# Patient Record
Sex: MALE | Race: WHITE | NOT HISPANIC OR LATINO | Employment: OTHER | URBAN - NONMETROPOLITAN AREA
[De-identification: names, ages, dates, MRNs, and addresses within clinical notes are randomized per-mention and may not be internally consistent; named-entity substitution may affect disease eponyms.]

---

## 2017-01-06 DIAGNOSIS — R25.9 ABNORMAL INVOLUNTARY MOVEMENT: Primary | ICD-10-CM

## 2017-01-06 RX ORDER — CYCLOBENZAPRINE HCL 10 MG
10 TABLET ORAL 2 TIMES DAILY
Qty: 60 TABLET | Refills: 3 | Status: SHIPPED | OUTPATIENT
Start: 2017-01-06 | End: 2017-05-08

## 2017-01-18 ENCOUNTER — OFFICE VISIT (OUTPATIENT)
Dept: CHIROPRACTIC MEDICINE | Facility: OTHER | Age: 82
End: 2017-01-18
Attending: CHIROPRACTOR
Payer: COMMERCIAL

## 2017-01-18 DIAGNOSIS — M99.02 SEGMENTAL AND SOMATIC DYSFUNCTION OF THORACIC REGION: ICD-10-CM

## 2017-01-18 DIAGNOSIS — M54.2 CERVICALGIA: ICD-10-CM

## 2017-01-18 DIAGNOSIS — M99.01 SEGMENTAL AND SOMATIC DYSFUNCTION OF CERVICAL REGION: Primary | ICD-10-CM

## 2017-01-18 DIAGNOSIS — M99.03 SEGMENTAL AND SOMATIC DYSFUNCTION OF LUMBAR REGION: ICD-10-CM

## 2017-01-18 PROCEDURE — 98941 CHIROPRACT MANJ 3-4 REGIONS: CPT | Mod: AT | Performed by: CHIROPRACTOR

## 2017-01-18 NOTE — PROGRESS NOTES
Subjective Finding:    Chief compalint: Patient presents with:  Neck Pain  Back Pain  , Pain Scale: 5/10, Intensity: dull and ache, Duration: 1 weeks, Change since last visit: , Radiating: bilateral buttock.    Date of injury:     Activities that the pain restricts:   Home/household/hobbies/social activities: yes.  Work duties: yes.  Sleep: no.  Makes symptoms better: rest.  Makes symptoms worse: activity, lumbar extension and lumbar flexion.  Have you seen anyone else for the symptoms? no.  Work related: no  Automobile related injury: no.    Objective and Assessment:    Posture Analysis:   High shoulder: right.  Head tilt: right.  High iliac crest: right.  Head carriage: neutral.  Thoracic Kyphosis: neutral.  Lumbar Lordosis: forward.    Lumbar Range of Motion: flexion decreased, extension decreased, left lateral flexion decreased and right lateral flexion decreased.  Cervical Range of Motion: .  Thoracic Range of Motion: .  Extremity Range of Motion: .    Palpation:   Quad lumb: bilateral, referred pain: no    Segmental dysfunction pre-treatment: T10  L4-5  SI. C5    Assessment post-treatment:  Cervical: ROM increased.  Thoracic: ROM increased.  Lumbar: ROM increased, pain and tenderness decreased and muscle spasm decreased.    Comments: .      Complicating Factors: .    Plan / Procedure:    Expected release date: .  Treatment plan: 1 times per month.  Instructed patient: ice 20 minutes every other hour as needed and rest.  Short term goals: reduce pain.  Long term goals: restore normal function.  Prognosis: excellent.

## 2017-01-25 ENCOUNTER — TRANSFERRED RECORDS (OUTPATIENT)
Dept: HEALTH INFORMATION MANAGEMENT | Facility: HOSPITAL | Age: 82
End: 2017-01-25

## 2017-01-27 ENCOUNTER — HOSPITAL ENCOUNTER (OUTPATIENT)
Dept: PHYSICAL THERAPY | Facility: HOSPITAL | Age: 82
Setting detail: THERAPIES SERIES
End: 2017-01-27
Attending: ORTHOPAEDIC SURGERY
Payer: MEDICARE

## 2017-01-27 PROCEDURE — 97035 APP MDLTY 1+ULTRASOUND EA 15: CPT | Mod: GP

## 2017-01-27 PROCEDURE — G8981 BODY POS CURRENT STATUS: HCPCS | Mod: GP,CK

## 2017-01-27 PROCEDURE — 97012 MECHANICAL TRACTION THERAPY: CPT | Mod: GP

## 2017-01-27 PROCEDURE — 97162 PT EVAL MOD COMPLEX 30 MIN: CPT | Mod: GP

## 2017-01-27 PROCEDURE — G8982 BODY POS GOAL STATUS: HCPCS | Mod: GP,CJ

## 2017-01-27 PROCEDURE — 40000718 ZZHC STATISTIC PT DEPARTMENT ORTHO VISIT

## 2017-01-27 NOTE — PROGRESS NOTES
01/27/17 0900   General Information   Type of Visit Initial OP Ortho PT Evaluation   Start of Care Date 01/27/17   Referring Physician Dr Berumen   Patient/Family Goals Statement less pain   Orders Evaluate and Treat   Orders Comment 1-2x week x 6 weeks prn. C spine pain , mod DDD, stretching, strengthening/manual therapy and modalities prn   Date of Order 01/25/17   Insurance Type Medicare   Medical Diagnosis Cervical DDD   Surgical/Medical history reviewed Yes   Precautions/Limitations no known precautions/limitations   General Information Comments neck pain may have been caused by bumping head on L side when getting into sons car   Body Part(s)   Body Part(s) Cervical Spine   Presentation and Etiology   Pertinent history of current problem (include personal factors and/or comorbidities that impact the POC) This 83 y/o male  with sudden onset of neck pain insidiously. Unsure of what caused it. Pt has no headaches, no UE symptoms. L> R side. Driving, sleeping are difficulty. MD issued hydrocodone, mm relaxant in am and pm. May have hurt neck by bumping head on L side when getting into car.    Impairments A. Pain;D. Decreased ROM;E. Decreased flexibility   Functional Limitations perform activities of daily living;perform desired leisure / sports activities  (driving, sleeping, )   Symptom Location L sided neck   How/Where did it occur From insidious onset   Onset date of current episode/exacerbation (approx 3 weeks ago)   Chronicity New   Pain rating (0-10 point scale) Best (/10);Worst (/10)   Best (/10) 5   Worst (/10) 2   Pain quality A. Sharp   Frequency of pain/symptoms A. Constant   Pain/symptoms exacerbated by G. Certain positions;I. Bending;F. Nothing  (driving)   Pain/symptoms eased by C. Rest;D. Nothing;G. Heat;H. Cold;I. 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 Retired   Living environment Apartment/condo   Current equipment-Gait/Locomotion None   Current equipment-ADL None   Fall Risk Screen   Per patient - Fall 2 or more times in past year? No   Per patient - Fall with injury in past year? No   Is patient a fall risk? No   System Outcome Measures   Outcome Measures (Neck disability index 22%)   Cervical Spine   Observation holds head rotated R slightly   Posture fwd head on neck,   Cervical Flexion ROM wnl   Cervical Extension ROM 75%   Cervical Right Side Bending ROM 50%   Cervical Left Side Bending ROM 50%   Cervical Right Rotation ROM 50%   Cervical Left Rotation ROM 25%   Shoulder AROM Screen wfl   Shoulder Shrug (C2-C4) Strength 5/5   Shoulder Abd (C5) Strength 4/5   Shoulder Add (C7) Strength 4   Shoulder ER (C5, C6) Strength 5   Shoulder IR (C5, C6) Strength 5   Elbow Flexion (C5, C6) Strength 5   Elbow Extension (C7) Strength 5   Upper Trapezius Flexibility L>R tightness/tenderness/guarding   Levator Scapula Flexibility L sided tightness   Scalene Flexibility L>R sided tightness   Palpation Signif tenderness to medium palpation L trap, scalenes, paraspinals   Dermatome/Sensory Testing equal to light touch t/o BUEs   Planned Therapy Interventions   Planned Therapy Interventions ROM;manual therapy;joint mobilization;stretching   Planned Therapy Interventions Comment HEP   Planned Modality Interventions   Planned Modality Interventions Electrical stimulation;Ultrasound;Traction   Planned Modality Interventions Comments prn   Clinical Impression   Criteria for Skilled Therapeutic Interventions Met yes, treatment indicated   PT Diagnosis Cervical DDD aggravated by strain from hitting head on car,   Influenced by the following impairments pain, tightness,    Functional limitations  due to impairments sleep, drive, working in garden   Clinical Presentation Evolving/Changing   Clinical Presentation Rationale previous hx of chronic back pain, OA, DJD,    Clinical Decision Making (Complexity) Moderate complexity   Therapy Frequency 2 times/Week   Predicted Duration of Therapy Intervention (days/wks) 4   Risk & Benefits of therapy have been explained Yes   Patient, Family & other staff in agreement with plan of care Yes   Clinical Impression Comments Pt will benefit from PT to improve CROM and segmental mobility through US, Traction, manual therapy, as well as ROM/stretching.    Education Assessment   Barriers to Learning No barriers   ORTHO GOALS   PT Ortho Eval Goals 1;2;3   Ortho Goal 1   Goal Identifier STG 1   Goal Description Pt will demonstrate indep HEP compliance to increase CROM to at least 75% of wnl   Target Date 02/17/17   Ortho Goal 2   Goal Identifier STG 2   Goal Description Pt will have indep postural correction for when sitting watching tv through education and HEP.    Target Date 02/17/17   Ortho Goal 3   Goal Identifier LTG   Goal Description Pt will return to painfree driving without restriction /difficulty for improved safety   Target Date 03/24/17   Total Evaluation Time   Total Evaluation Time 30   Therapy Certification   Certification date from 01/27/17   Certification date to 03/24/17   Medical Diagnosis Cervical strain, DDD   I certify the need for these services furnished under this plan of treatment and while under my care. (Physician co-signature of this document indicates review and certification of the therapy plan).      _____________________________     __________________________    ____________  Physician's Signature                 Date               Time

## 2017-01-27 NOTE — PROGRESS NOTES
01/27/17 0900   General Information   Type of Visit Initial OP Ortho PT Evaluation   Start of Care Date 01/27/17   Referring Physician Dr Berumen   Patient/Family Goals Statement less pain   Orders Evaluate and Treat   Orders Comment 1-2x week x 6 weeks prn. C spine pain , mod DDD, stretching, strengthening/manual therapy and modalities prn   Date of Order 01/25/17   Insurance Type Medicare   Medical Diagnosis Cervical DDD   Surgical/Medical history reviewed Yes   Precautions/Limitations no known precautions/limitations   General Information Comments neck pain may have been caused by bumping head on L side when getting into sons car   Body Part(s)   Body Part(s) Cervical Spine   Presentation and Etiology   Pertinent history of current problem (include personal factors and/or comorbidities that impact the POC) This 83 y/o male  with sudden onset of neck pain insidiously. Unsure of what caused it. Pt has no headaches, no UE symptoms. L> R side. Driving, sleeping are difficulty. MD issued hydrocodone, mm relaxant in am and pm. May have hurt neck by bumping head on L side when getting into car.    Impairments A. Pain;D. Decreased ROM;E. Decreased flexibility   Functional Limitations perform activities of daily living;perform desired leisure / sports activities  (driving, sleeping, )   Symptom Location L sided neck   How/Where did it occur From insidious onset   Onset date of current episode/exacerbation (approx 3 weeks ago)   Chronicity New   Pain rating (0-10 point scale) Best (/10);Worst (/10)   Best (/10) 5   Worst (/10) 2   Pain quality A. Sharp   Frequency of pain/symptoms A. Constant   Pain/symptoms exacerbated by G. Certain positions;I. Bending;F. Nothing  (driving)   Pain/symptoms eased by C. Rest;D. Nothing;G. Heat;H. Cold;I. 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 Retired   Living environment Apartment/condo   Current equipment-Gait/Locomotion None   Current equipment-ADL None   Fall Risk Screen   Per patient - Fall 2 or more times in past year? No   Per patient - Fall with injury in past year? No   Is patient a fall risk? No   System Outcome Measures   Outcome Measures (Neck disability index 22%)   Cervical Spine   Observation holds head rotated R slightly   Posture fwd head on neck,   Cervical Flexion ROM wnl   Cervical Extension ROM 75%   Cervical Right Side Bending ROM 50%   Cervical Left Side Bending ROM 50%   Cervical Right Rotation ROM 50%   Cervical Left Rotation ROM 25%   Shoulder AROM Screen wfl   Shoulder Shrug (C2-C4) Strength 5/5   Shoulder Abd (C5) Strength 4/5   Shoulder Add (C7) Strength 4   Shoulder ER (C5, C6) Strength 5   Shoulder IR (C5, C6) Strength 5   Elbow Flexion (C5, C6) Strength 5   Elbow Extension (C7) Strength 5   Upper Trapezius Flexibility L>R tightness/tenderness/guarding   Levator Scapula Flexibility L sided tightness   Scalene Flexibility L>R sided tightness   Palpation Signif tenderness to medium palpation L trap, scalenes, paraspinals   Dermatome/Sensory Testing equal to light touch t/o BUEs   Planned Therapy Interventions   Planned Therapy Interventions ROM;manual therapy;joint mobilization;stretching   Planned Therapy Interventions Comment HEP   Planned Modality Interventions   Planned Modality Interventions Electrical stimulation;Ultrasound;Traction   Planned Modality Interventions Comments prn   Clinical Impression   Criteria for Skilled Therapeutic Interventions Met yes, treatment indicated   PT Diagnosis Cervical DDD aggravated by strain from hitting head on car,   Influenced by the following impairments pain, tightness,    Functional limitations  due to impairments sleep, drive, working in garden   Clinical Presentation Evolving/Changing   Clinical Presentation Rationale previous hx of chronic back pain, OA, DJD,    Clinical Decision Making (Complexity) Moderate complexity   Therapy Frequency 2 times/Week   Predicted Duration of Therapy Intervention (days/wks) 4   Risk & Benefits of therapy have been explained Yes   Patient, Family & other staff in agreement with plan of care Yes   Clinical Impression Comments Pt will benefit from PT to improve CROM and segmental mobility through US, Traction, manual therapy, as well as ROM/stretching.    Education Assessment   Barriers to Learning No barriers   ORTHO GOALS   PT Ortho Eval Goals 1;2;3   Ortho Goal 1   Goal Identifier STG 1   Goal Description Pt will demonstrate indep HEP compliance to increase CROM to at least 75% of wnl   Target Date 02/17/17   Ortho Goal 2   Goal Identifier STG 2   Goal Description Pt will have indep postural correction for when sitting watching tv through education and HEP.    Target Date 02/17/17   Ortho Goal 3   Goal Identifier LTG   Goal Description Pt will return to painfree driving without restriction /difficulty for improved safety   Target Date 03/24/17   Total Evaluation Time   Total Evaluation Time 30   Therapy Certification   Certification date from 01/27/17   Certification date to 03/24/17   Medical Diagnosis Cervical strain, DDD

## 2017-02-01 ENCOUNTER — HOSPITAL ENCOUNTER (OUTPATIENT)
Dept: PHYSICAL THERAPY | Facility: HOSPITAL | Age: 82
Setting detail: THERAPIES SERIES
End: 2017-02-01
Attending: FAMILY MEDICINE
Payer: MEDICARE

## 2017-02-01 PROCEDURE — 97035 APP MDLTY 1+ULTRASOUND EA 15: CPT | Mod: GP

## 2017-02-01 PROCEDURE — 40000718 ZZHC STATISTIC PT DEPARTMENT ORTHO VISIT

## 2017-02-01 PROCEDURE — 97012 MECHANICAL TRACTION THERAPY: CPT | Mod: GP

## 2017-02-03 ENCOUNTER — HOSPITAL ENCOUNTER (OUTPATIENT)
Dept: PHYSICAL THERAPY | Facility: HOSPITAL | Age: 82
Setting detail: THERAPIES SERIES
End: 2017-02-03
Attending: FAMILY MEDICINE
Payer: MEDICARE

## 2017-02-03 PROCEDURE — 97012 MECHANICAL TRACTION THERAPY: CPT | Mod: GP

## 2017-02-03 PROCEDURE — 40000718 ZZHC STATISTIC PT DEPARTMENT ORTHO VISIT

## 2017-02-03 PROCEDURE — 97035 APP MDLTY 1+ULTRASOUND EA 15: CPT | Mod: GP

## 2017-02-07 DIAGNOSIS — M54.50 CHRONIC BILATERAL LOW BACK PAIN WITHOUT SCIATICA: Primary | ICD-10-CM

## 2017-02-07 DIAGNOSIS — G89.29 CHRONIC BILATERAL LOW BACK PAIN WITHOUT SCIATICA: Primary | ICD-10-CM

## 2017-02-07 RX ORDER — HYDROCODONE BITARTRATE AND ACETAMINOPHEN 5; 325 MG/1; MG/1
TABLET ORAL
Qty: 60 TABLET | Refills: 0 | Status: SHIPPED | OUTPATIENT
Start: 2017-02-07 | End: 2017-06-13

## 2017-02-08 ENCOUNTER — HOSPITAL ENCOUNTER (OUTPATIENT)
Dept: PHYSICAL THERAPY | Facility: HOSPITAL | Age: 82
Setting detail: THERAPIES SERIES
End: 2017-02-08
Attending: FAMILY MEDICINE
Payer: MEDICARE

## 2017-02-08 PROCEDURE — 97110 THERAPEUTIC EXERCISES: CPT | Mod: GP

## 2017-02-08 PROCEDURE — 40000718 ZZHC STATISTIC PT DEPARTMENT ORTHO VISIT

## 2017-02-08 PROCEDURE — 97012 MECHANICAL TRACTION THERAPY: CPT | Mod: GP

## 2017-02-10 ENCOUNTER — HOSPITAL ENCOUNTER (OUTPATIENT)
Dept: PHYSICAL THERAPY | Facility: HOSPITAL | Age: 82
Setting detail: THERAPIES SERIES
End: 2017-02-10
Attending: FAMILY MEDICINE
Payer: MEDICARE

## 2017-02-10 PROCEDURE — 40000718 ZZHC STATISTIC PT DEPARTMENT ORTHO VISIT

## 2017-02-10 PROCEDURE — 97110 THERAPEUTIC EXERCISES: CPT | Mod: GP

## 2017-02-10 PROCEDURE — 97012 MECHANICAL TRACTION THERAPY: CPT | Mod: GP

## 2017-02-15 ENCOUNTER — HOSPITAL ENCOUNTER (OUTPATIENT)
Dept: PHYSICAL THERAPY | Facility: HOSPITAL | Age: 82
Setting detail: THERAPIES SERIES
End: 2017-02-15
Attending: FAMILY MEDICINE
Payer: MEDICARE

## 2017-02-15 PROCEDURE — 97012 MECHANICAL TRACTION THERAPY: CPT | Mod: GP

## 2017-02-15 PROCEDURE — 97035 APP MDLTY 1+ULTRASOUND EA 15: CPT | Mod: GP

## 2017-02-15 PROCEDURE — 40000718 ZZHC STATISTIC PT DEPARTMENT ORTHO VISIT

## 2017-02-17 ENCOUNTER — HOSPITAL ENCOUNTER (OUTPATIENT)
Dept: PHYSICAL THERAPY | Facility: HOSPITAL | Age: 82
Setting detail: THERAPIES SERIES
End: 2017-02-17
Attending: FAMILY MEDICINE
Payer: MEDICARE

## 2017-02-17 PROCEDURE — 40000718 ZZHC STATISTIC PT DEPARTMENT ORTHO VISIT

## 2017-02-17 PROCEDURE — 97012 MECHANICAL TRACTION THERAPY: CPT | Mod: GP

## 2017-02-17 PROCEDURE — 97035 APP MDLTY 1+ULTRASOUND EA 15: CPT | Mod: GP

## 2017-02-21 ENCOUNTER — TELEPHONE (OUTPATIENT)
Dept: FAMILY MEDICINE | Facility: OTHER | Age: 82
End: 2017-02-21

## 2017-02-21 ENCOUNTER — HOSPITAL ENCOUNTER (OUTPATIENT)
Dept: PHYSICAL THERAPY | Facility: HOSPITAL | Age: 82
Setting detail: THERAPIES SERIES
End: 2017-02-21
Attending: FAMILY MEDICINE
Payer: MEDICARE

## 2017-02-21 DIAGNOSIS — R79.9 ABNORMAL FINDING OF BLOOD CHEMISTRY: ICD-10-CM

## 2017-02-21 DIAGNOSIS — I10 BENIGN ESSENTIAL HYPERTENSION: ICD-10-CM

## 2017-02-21 DIAGNOSIS — R73.03 PREDIABETES: ICD-10-CM

## 2017-02-21 DIAGNOSIS — D50.8 OTHER IRON DEFICIENCY ANEMIA: ICD-10-CM

## 2017-02-21 DIAGNOSIS — E78.5 HYPERLIPIDEMIA LDL GOAL <130: Primary | ICD-10-CM

## 2017-02-21 PROCEDURE — 40000718 ZZHC STATISTIC PT DEPARTMENT ORTHO VISIT

## 2017-02-21 PROCEDURE — 97012 MECHANICAL TRACTION THERAPY: CPT | Mod: GP

## 2017-02-21 PROCEDURE — 97110 THERAPEUTIC EXERCISES: CPT | Mod: GP

## 2017-02-21 PROCEDURE — 97035 APP MDLTY 1+ULTRASOUND EA 15: CPT | Mod: GP

## 2017-02-21 NOTE — TELEPHONE ENCOUNTER
Pt is due for a lipid, bmp and alt. Should he have a PSA also? He has a hx of Hypertrophy of the Prostate.

## 2017-02-21 NOTE — TELEPHONE ENCOUNTER
Pt calling and asking if Dr would put in lab orders for his physical which is scheduled for 02/28/17. He would like to do them in [place] at the [facility].

## 2017-02-23 ENCOUNTER — HOSPITAL ENCOUNTER (OUTPATIENT)
Dept: PHYSICAL THERAPY | Facility: HOSPITAL | Age: 82
Setting detail: THERAPIES SERIES
End: 2017-02-23
Attending: FAMILY MEDICINE
Payer: MEDICARE

## 2017-02-23 DIAGNOSIS — D50.8 OTHER IRON DEFICIENCY ANEMIA: ICD-10-CM

## 2017-02-23 DIAGNOSIS — R73.03 PREDIABETES: ICD-10-CM

## 2017-02-23 DIAGNOSIS — R79.9 ABNORMAL FINDING OF BLOOD CHEMISTRY: ICD-10-CM

## 2017-02-23 DIAGNOSIS — E78.5 HYPERLIPIDEMIA LDL GOAL <130: ICD-10-CM

## 2017-02-23 DIAGNOSIS — I10 BENIGN ESSENTIAL HYPERTENSION: ICD-10-CM

## 2017-02-23 LAB
ALT SERPL W P-5'-P-CCNC: 20 U/L (ref 0–70)
ANION GAP SERPL CALCULATED.3IONS-SCNC: 8 MMOL/L (ref 3–14)
BUN SERPL-MCNC: 25 MG/DL (ref 7–30)
CALCIUM SERPL-MCNC: 9.3 MG/DL (ref 8.5–10.1)
CHLORIDE SERPL-SCNC: 107 MMOL/L (ref 94–109)
CHOLEST SERPL-MCNC: 117 MG/DL
CO2 SERPL-SCNC: 27 MMOL/L (ref 20–32)
CREAT SERPL-MCNC: 1.46 MG/DL (ref 0.66–1.25)
ERYTHROCYTE [DISTWIDTH] IN BLOOD BY AUTOMATED COUNT: 15.5 % (ref 10–15)
EST. AVERAGE GLUCOSE BLD GHB EST-MCNC: 146 MG/DL
GFR SERPL CREATININE-BSD FRML MDRD: 46 ML/MIN/1.7M2
GLUCOSE SERPL-MCNC: 116 MG/DL (ref 70–99)
HBA1C MFR BLD: 6.7 % (ref 4.3–6)
HCT VFR BLD AUTO: 42.9 % (ref 40–53)
HDLC SERPL-MCNC: 36 MG/DL
HGB BLD-MCNC: 13.8 G/DL (ref 13.3–17.7)
LDLC SERPL CALC-MCNC: 44 MG/DL
MCH RBC QN AUTO: 28 PG (ref 26.5–33)
MCHC RBC AUTO-ENTMCNC: 32.2 G/DL (ref 31.5–36.5)
MCV RBC AUTO: 87 FL (ref 78–100)
NONHDLC SERPL-MCNC: 81 MG/DL
PLATELET # BLD AUTO: 319 10E9/L (ref 150–450)
POTASSIUM SERPL-SCNC: 5 MMOL/L (ref 3.4–5.3)
RBC # BLD AUTO: 4.93 10E12/L (ref 4.4–5.9)
SODIUM SERPL-SCNC: 142 MMOL/L (ref 133–144)
TRIGL SERPL-MCNC: 183 MG/DL
WBC # BLD AUTO: 7.5 10E9/L (ref 4–11)

## 2017-02-23 PROCEDURE — 84460 ALANINE AMINO (ALT) (SGPT): CPT | Performed by: FAMILY MEDICINE

## 2017-02-23 PROCEDURE — 40000718 ZZHC STATISTIC PT DEPARTMENT ORTHO VISIT

## 2017-02-23 PROCEDURE — 97035 APP MDLTY 1+ULTRASOUND EA 15: CPT | Mod: GP

## 2017-02-23 PROCEDURE — 83036 HEMOGLOBIN GLYCOSYLATED A1C: CPT | Performed by: FAMILY MEDICINE

## 2017-02-23 PROCEDURE — 97012 MECHANICAL TRACTION THERAPY: CPT | Mod: GP

## 2017-02-23 PROCEDURE — 40000788 ZZHCL STATISTIC ESTIMATED AVERAGE GLUCOSE: Performed by: FAMILY MEDICINE

## 2017-02-23 PROCEDURE — 85027 COMPLETE CBC AUTOMATED: CPT | Performed by: FAMILY MEDICINE

## 2017-02-23 PROCEDURE — 80061 LIPID PANEL: CPT | Performed by: FAMILY MEDICINE

## 2017-02-23 PROCEDURE — 80048 BASIC METABOLIC PNL TOTAL CA: CPT | Performed by: FAMILY MEDICINE

## 2017-02-23 PROCEDURE — 36415 COLL VENOUS BLD VENIPUNCTURE: CPT | Performed by: FAMILY MEDICINE

## 2017-02-27 ENCOUNTER — OFFICE VISIT (OUTPATIENT)
Dept: FAMILY MEDICINE | Facility: OTHER | Age: 82
End: 2017-02-27
Attending: FAMILY MEDICINE
Payer: COMMERCIAL

## 2017-02-27 VITALS
TEMPERATURE: 98.1 F | HEART RATE: 104 BPM | WEIGHT: 200 LBS | RESPIRATION RATE: 20 BRPM | DIASTOLIC BLOOD PRESSURE: 66 MMHG | HEIGHT: 67 IN | SYSTOLIC BLOOD PRESSURE: 126 MMHG | BODY MASS INDEX: 31.39 KG/M2 | OXYGEN SATURATION: 94 %

## 2017-02-27 DIAGNOSIS — E78.49 OTHER HYPERLIPIDEMIA: ICD-10-CM

## 2017-02-27 DIAGNOSIS — I49.9 IRREGULAR HEART RATE: ICD-10-CM

## 2017-02-27 DIAGNOSIS — Z23 NEED FOR PROPHYLACTIC VACCINATION AND INOCULATION AGAINST INFLUENZA: ICD-10-CM

## 2017-02-27 DIAGNOSIS — Z00.00 ROUTINE GENERAL MEDICAL EXAMINATION AT A HEALTH CARE FACILITY: Primary | ICD-10-CM

## 2017-02-27 DIAGNOSIS — Z23 NEED FOR PROPHYLACTIC VACCINATION AND INOCULATION AGAINST COMBINATIONS OF DISEASE: ICD-10-CM

## 2017-02-27 DIAGNOSIS — N40.0 HYPERTROPHY OF PROSTATE WITHOUT URINARY OBSTRUCTION: ICD-10-CM

## 2017-02-27 DIAGNOSIS — R73.9 BLOOD GLUCOSE ELEVATED: ICD-10-CM

## 2017-02-27 PROCEDURE — 93010 ELECTROCARDIOGRAM REPORT: CPT | Performed by: INTERNAL MEDICINE

## 2017-02-27 PROCEDURE — 90471 IMMUNIZATION ADMIN: CPT | Performed by: FAMILY MEDICINE

## 2017-02-27 PROCEDURE — 99397 PER PM REEVAL EST PAT 65+ YR: CPT | Mod: 25 | Performed by: FAMILY MEDICINE

## 2017-02-27 PROCEDURE — 93005 ELECTROCARDIOGRAM TRACING: CPT

## 2017-02-27 PROCEDURE — 90670 PCV13 VACCINE IM: CPT | Performed by: FAMILY MEDICINE

## 2017-02-27 PROCEDURE — G0009 ADMIN PNEUMOCOCCAL VACCINE: HCPCS | Performed by: FAMILY MEDICINE

## 2017-02-27 ASSESSMENT — PAIN SCALES - GENERAL: PAINLEVEL: MILD PAIN (2)

## 2017-02-27 NOTE — PROGRESS NOTES
Subjective:  Kenyon Rivera is a 82 year old male who presents for routine cares.  Doing well with slight fatigue but overall stable.      Past Medical History   Diagnosis Date     Calculus of gallbladder with other cholecystitis, without mention of obstruction 11/18/2004     Displacement of lumbar intervertebral disc without myelopathy 11/17/2000     Hyperplasia of prostate 01/04/2000     Hypertension, Benign 07/11/2011     Insomnia, unspecified 03/17/2011     Lumbago 11/02/1999     Nonallopathic lesion of cervical region, not elsewhere classified 12/19/2001     Nonallopathic lesion of lumbar region, not elsewhere classified 03/05/2003     Nonallopathic lesion of thoracic region, not elsewhere classified 11/17/2000     Other and unspecified hyperlipidemia 12/14/1999     Other diseases of respiratory system, not elsewhere classified 04/21/2004     Other malaise and fatigue 04/05/2006     Pneumonia, organism unspecified 12/13/1999       Past Surgical History   Procedure Laterality Date     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 Mother      Myocardial Infarction Mother      HEART DISEASE Mother      Heart Disease     Other - See Comments Other      Colitis     CANCER Sister      Leukemia       Social History   Substance Use Topics     Smoking status: Former Smoker     Types: Cigarettes     Quit date: 1/30/1966     Smokeless tobacco: Never Used      Comment: Tried to Quit (YES)     Alcohol use Yes      Comment: RARELY       Current Outpatient Prescriptions   Medication      HYDROcodone-acetaminophen (NORCO) 5-325 MG per tablet     cyclobenzaprine (FLEXERIL) 10 MG tablet     senna-docusate (SENOKOT-S;PERICOLACE) 8.6-50 MG per tablet     calcium carbonate (TUMS) 500 MG chewable tablet     SIMETHICONE-80 PO     albuterol (PROAIR HFA, PROVENTIL HFA, VENTOLIN HFA) 108 (90 BASE) MCG/ACT inhaler     cetirizine (ZYRTEC) 10 MG tablet     IBUPROFEN PO     diclofenac (VOLTAREN) 1 % GEL     temazepam (RESTORIL) 30 MG capsule     acetaminophen (TYLENOL) 500 MG tablet     Cholecalciferol (VITAMIN D3) 2000 UNITS CAPS     Omega-3 Fatty Acids (FISH OIL) 1200 MG CAPS     aspirin 81 MG tablet     losartan (COZAAR) 50 MG tablet     omeprazole 20 MG tablet     simvastatin (ZOCOR) 20 MG tablet     tamsulosin (FLOMAX) 0.4 MG 24 hr capsule     No current facility-administered medications for this visit.        Allergies   Allergen Reactions     Chlorzoxazone      Parafon Forte       Review of Systems:  Gen: negative for fever, chills, change in weight  Derm: negative for worrisome rashes, moles or lesions  Eyes: negative for vision changes or irritation  ENT: negative for ear, mouth and throat problems  Resp: negative for significant cough or SOB  Breast: negative for masses, tenderness or discharge  CV: negative for chest pain, palpitations or peripheral edema  GI: negative for nausea, abdominal pain, heartburn, or change in bowel habits  : negative for frequency, dysuria, or hematuria  Musculoskeletal: negative for significant arthralgias or myalgia  Neuro: negative for weakness, dizziness or paresthesias  Endo: negative for temperature intolerance, skin/hair changes  Heme: negative for bleeding problems  Psych: negative for changes in mood or affect    Objective:  B/P: 126/66, T: 98.1, P: 104, R: 20    Physical Exam:  Constitutional: healthy, alert and no distress  Head: Normocephalic. No masses, lesions, tenderness or abnormalities  ENT: ENT exam normal, no neck nodes or sinus tenderness  CV: irregular  rhythm. No murmurs, clicks gallops or rub  Pulm: Lungs clear to auscultation b/l, no rhonchi, rales or wheezes.  GI: Abdomen soft, non-tender. BS normal. No masses, organomegaly  : deferred.   Musculoskeletal: extremities normal- no gross deformities noted, gait normal and normal muscle tone  Skin: no suspicious lesions or rashes  Neuroc: Gait normal. Reflexes normal and symmetric. Sensation grossly WNL.  Psych: mentation appears normal and affect normal/bright  Heme: normal ant/post cervical, axillary, supraclavicular and inguinal nodes    Labs pending, see chart for results.    Assessment and Plan:  (Z00.00) Routine general medical examination at a health care facility  (primary encounter diagnosis)  Comment: doing well.   Plan: update labs and follow.     (E78.4) Other hyperlipidemia  Comment: stable.   Plan: no change.     (N40.0) Hypertrophy of prostate without urinary obstruction  Comment: stable.   Plan: no change.     (R73.9) Blood glucose elevated  Comment: history of.   Plan: following     (I49.9) Irregular heart rate  Comment: sinus underlying.   Plan: EKG 12-lead complete w/read - (Clinic         Performed)        Discussed.  ekg reviewed.  No change.          Eric Bernal

## 2017-02-27 NOTE — PROGRESS NOTES
Injectable Influenza Immunization Documentation    1.  Is the person to be vaccinated sick today?  No    2. Does the person to be vaccinated have an allergy to eggs or to a component of the vaccine?  No    3. Has the person to be vaccinated today ever had a serious reaction to influenza vaccine in the past?  No    4. Has the person to be vaccinated ever had Guillain-[place] syndrome?  No     Form completed by Orly Domingo

## 2017-02-27 NOTE — NURSING NOTE
"Chief Complaint   Patient presents with     Physical       Initial /66 (BP Location: Right arm, Patient Position: Chair, Cuff Size: Adult Large)  Pulse 104  Temp 98.1  F (36.7  C) (Tympanic)  Resp 20  Ht 5' 6.5\" (1.689 m)  Wt 200 lb (90.7 kg)  SpO2 94%  BMI 31.8 kg/m2 Estimated body mass index is 31.8 kg/(m^2) as calculated from the following:    Height as of this encounter: 5' 6.5\" (1.689 m).    Weight as of this encounter: 200 lb (90.7 kg).  Medication Reconciliation: complete   Adriana Palmer LPN      "

## 2017-03-15 ENCOUNTER — OFFICE VISIT (OUTPATIENT)
Dept: FAMILY MEDICINE | Facility: OTHER | Age: 82
End: 2017-03-15
Attending: FAMILY MEDICINE
Payer: OTHER MISCELLANEOUS

## 2017-03-15 VITALS
OXYGEN SATURATION: 96 % | WEIGHT: 197 LBS | SYSTOLIC BLOOD PRESSURE: 132 MMHG | DIASTOLIC BLOOD PRESSURE: 64 MMHG | HEIGHT: 67 IN | HEART RATE: 100 BPM | RESPIRATION RATE: 18 BRPM | BODY MASS INDEX: 30.92 KG/M2 | TEMPERATURE: 97.6 F

## 2017-03-15 DIAGNOSIS — M54.50 CHRONIC BILATERAL LOW BACK PAIN WITHOUT SCIATICA: ICD-10-CM

## 2017-03-15 DIAGNOSIS — M54.41 MIDLINE LOW BACK PAIN WITH RIGHT-SIDED SCIATICA, UNSPECIFIED CHRONICITY: Primary | ICD-10-CM

## 2017-03-15 DIAGNOSIS — G89.29 CHRONIC BILATERAL LOW BACK PAIN WITHOUT SCIATICA: ICD-10-CM

## 2017-03-15 PROCEDURE — 99213 OFFICE O/P EST LOW 20 MIN: CPT | Performed by: FAMILY MEDICINE

## 2017-03-15 ASSESSMENT — PAIN SCALES - GENERAL: PAINLEVEL: MILD PAIN (2)

## 2017-03-15 NOTE — PROGRESS NOTES
Occupational Visit     Subjective:  Kenyon Rivera, 82 year old, male is seen 3/15/17.  The date of injury is 5/31/75.  The patient is employed at [employer].  He has LBP down the right leg and down the left some.  He is in need of PT at this time.  He uses lortab reliably without excess or issue.        Allergies   Allergen Reactions     Chlorzoxazone      Parafon Forte         Review of Systems:  Pain as above.  No red flags.       OBJECTIVE:  Vitals: B/P: 132/64, T: 97.6, P: 100, R: 18      Exam:  Walking with a slight limp.  No weakness noted.       Labs:none.       ASSESSMENT/PLAN:    (M54.41) Midline low back pain with right-sided sciatica, unspecified chronicity  (primary encounter diagnosis)  Comment: ongoing, stable.   Plan: PHYSICAL THERAPY REFERRAL        Due for pt, which he needs every year.  Referred.  Reliable use of lortab, which averages 1/2 pill daily at night.  No issues there.  Otherwise f/u annually for this.      (M54.5,  G89.29) Chronic bilateral low back pain without sciatica  Comment: as above.    Plan: as above.

## 2017-03-15 NOTE — MR AVS SNAPSHOT
After Visit Summary   3/15/2017    Kenyon Rivera    MRN: [id number]           Patient Information     Date Of Birth          5/31/1934        Visit Information        Provider Department      3/15/2017 9:45 AM Eric Bernal MD [facility]        Today's Diagnoses     Midline low back pain with right-sided sciatica, unspecified chronicity    -  1    Chronic bilateral low back pain without sciatica          Care Instructions    F/u with ongoing concerns.         Follow-ups after your visit        Additional Services     PHYSICAL THERAPY REFERRAL       *This therapy referral will be filtered to a centralized scheduling office at [facility] and the patient will receive a call to schedule an appointment at a [facility] location most convenient for them. *     [facility] provides Physical Therapy evaluation and treatment and many specialty services across the [facility] system.  If requesting a specialty program, please choose from the list below.    If you have not heard from the scheduling office within 2 business days, please call [phone] for all locations, with the exception of [place], please call [phone].  Treatment: Evaluation & Treatment  Special Instructions/Modalities: none,  He will want traction  Special Programs: none    Please be aware that coverage of these services is subject to the terms and limitations of your health insurance plan.  Call member services at your health plan with any benefit or coverage questions.      **Note to Provider:  If you are referring outside of [facility] for the therapy appointment, please list the name of the location in the  special instructions  above, print the referral and give to the patient to schedule the appointment.                  Who to contact     If you have questions or need follow up information about today's clinic visit or your schedule please contact [facility]  "directly at [phone].  Normal or non-critical lab and imaging results will be communicated to you by MyChart, letter or phone within 4 business days after the clinic has received the results. If you do not hear from us within 7 days, please contact the clinic through MyChart or phone. If you have a critical or abnormal lab result, we will notify you by phone as soon as possible.  Submit refill requests through TeamBuyhart or call your pharmacy and they will forward the refill request to us. Please allow 3 business days for your refill to be completed.          Additional Information About Your Visit        Care EveryWhere ID     This is your Care EveryWhere ID. This could be used by other organizations to access your [facility] medical records  [id number]        Your Vitals Were     Pulse Temperature Respirations Height Pulse Oximetry BMI (Body Mass Index)    100 97.6  F (36.4  C) (Tympanic) 18 5' 6.5\" (1.689 m) 96% 31.32 kg/m2       Blood Pressure from Last 3 Encounters:   03/15/17 132/64   02/27/17 126/66   10/31/16 130/66    Weight from Last 3 Encounters:   03/15/17 197 lb (89.4 kg)   02/27/17 200 lb (90.7 kg)   10/31/16 198 lb (89.8 kg)              We Performed the Following     PHYSICAL THERAPY REFERRAL        Primary Care Provider Office Phone # Fax #    Eric Bernal [phone]       [address] [address]        Thank you!     Thank you for choosing [facility]  for your care. Our goal is always to provide you with excellent care. Hearing back from our patients is one way we can continue to improve our services. Please take a few minutes to complete the written survey that you may receive in the mail after your visit with us. Thank you!             Your Updated Medication List - Protect others around you: Learn how to safely use, store and throw away your medicines at www.disposemymeds.org.          This list is accurate as of: 3/15/17  " 9:46 AM.  Always use your most recent med list.                   Brand Name Dispense Instructions for use    acetaminophen 500 MG tablet    TYLENOL     Take 500-1,000 mg by mouth every 6 hours as needed       albuterol 108 (90 BASE) MCG/ACT Inhaler    PROAIR HFA/PROVENTIL HFA/VENTOLIN HFA     Inhale 2 puffs into the lungs every 4 hours as needed for shortness of breath / dyspnea or wheezing       aspirin 81 MG tablet      Take 81 mg by mouth daily       calcium carbonate 500 MG chewable tablet    TUMS     Take 1 chew tab by mouth every 4 hours as needed for heartburn       cetirizine 10 MG tablet    zyrTEC     Take 10 mg by mouth daily as needed for allergies       cyclobenzaprine 10 MG tablet    FLEXERIL    60 tablet    Take 1 tablet (10 mg) by mouth 2 times daily       Fish Oil 1200 MG Caps      Take by mouth daily Fish oil 1290 omega 3 900       FLOMAX 0.4 MG capsule   Generic drug:  tamsulosin      Take 1 capsule by mouth At Bedtime.       HYDROcodone-acetaminophen 5-325 MG per tablet    NORCO    60 tablet    Take 0.5 to 1 tablet by oral route every 8 hours as needed for pain       IBUPROFEN PO      Take 200 mg by mouth every 6 hours as needed for moderate pain       losartan 50 MG tablet    COZAAR     Take 1.5 tablets by mouth daily.       omeprazole 20 MG tablet      Take 1 tablet by mouth 2 times daily.       senna-docusate 8.6-50 MG per tablet    SENOKOT-S;PERICOLACE     Take 1 tablet by mouth daily       SIMETHICONE-80 PO      Take 80 mg by mouth every 4 hours as needed for intestinal gas       temazepam 30 MG capsule    RESTORIL     Take 1 capsule by mouth nightly as needed       vitamin D3 2000 UNITS Caps      Take by mouth daily       VOLTAREN 1 % Gel topical gel   Generic drug:  diclofenac      Apply 2 g topically every 6 hours as needed #2 tubes written by Dr Berumen and 2 refills.       ZOCOR 20 MG tablet   Generic drug:  simvastatin      Take 1 tablet by mouth daily.

## 2017-03-15 NOTE — NURSING NOTE
"Chief Complaint   Patient presents with     Work Comp       Initial /64 (BP Location: Left arm, Patient Position: Chair, Cuff Size: Adult Large)  Pulse 100  Temp 97.6  F (36.4  C) (Tympanic)  Resp 18  Ht 5' 6.5\" (1.689 m)  Wt 197 lb (89.4 kg)  SpO2 96%  BMI 31.32 kg/m2 Estimated body mass index is 31.32 kg/(m^2) as calculated from the following:    Height as of this encounter: 5' 6.5\" (1.689 m).    Weight as of this encounter: 197 lb (89.4 kg).  Medication Reconciliation: complete   Adriana [facility] LPN      "

## 2017-03-21 ENCOUNTER — HOSPITAL ENCOUNTER (OUTPATIENT)
Dept: PHYSICAL THERAPY | Facility: HOSPITAL | Age: 82
Setting detail: THERAPIES SERIES
End: 2017-03-21
Attending: FAMILY MEDICINE
Payer: OTHER MISCELLANEOUS

## 2017-03-21 PROCEDURE — 40000718 ZZHC STATISTIC PT DEPARTMENT ORTHO VISIT

## 2017-03-21 PROCEDURE — G8978 MOBILITY CURRENT STATUS: HCPCS | Mod: GP,CJ

## 2017-03-21 PROCEDURE — 97012 MECHANICAL TRACTION THERAPY: CPT | Mod: GP

## 2017-03-21 PROCEDURE — G8979 MOBILITY GOAL STATUS: HCPCS | Mod: GP,CI

## 2017-03-21 PROCEDURE — 97161 PT EVAL LOW COMPLEX 20 MIN: CPT | Mod: GP

## 2017-03-21 PROCEDURE — 97014 ELECTRIC STIMULATION THERAPY: CPT | Mod: GP

## 2017-03-21 NOTE — PROGRESS NOTES
03/21/17 1006   The [facility] STarT Back Screening Tool (  [facility] 01/08/07, Funded by [employer]) Used with permission   1  My back pain has spread down my leg(s) at some time in the last 2 weeks (!) 1   2  I have had pain in the shoulder or neck at some time in the last 2 weeks 0   3  I have only walked short distances because of my back pain 0   4  In the last 2 weeks, I have dressed more slowly than usual because of back pain 0   5  It s not really safe for a person with a condition like mine to be physically active 0   6  Worrying thoughts have been going through my mind a lot of the time 0   7  I feel that my back pain is terrible and it s never going to get any better 0   8  In general I have not enjoyed all the things I used to enjoy (!) 1   9.  Overall, how bothersome has your back pain been in the last 2 weeks? 1   The [facility] STarT Back Tool Scores    Sub-Score (Q5-9) 2   Total Score (all 9) 3   Risk: LOW

## 2017-03-21 NOTE — PROGRESS NOTES
03/21/17 [facility]   Oswestry Disability Index (LIZANDOR   [employer] 1980 Version 2.1a, All rights reserved)   Section 1 - Pain intensity 3   Section 2 - Personal care (washing, dressing, etc.)  0   Section 3 - Lifting 3   Section 4 - Walking 1   Section 5 - Sitting 1   Section 6 - Standing 2   Section 7 - Sleeping 2   Section 8 - Sex life (if applicable) 3   Section 9 - Social life 2   Section 10 - Traveling 2   Sum 19   Count 10   Oswestry Score (%) 38 %

## 2017-03-21 NOTE — PROGRESS NOTES
03/21/17 0946   General Information   Type of Visit Initial OP Ortho PT Evaluation   Start of Care Date 03/21/17   Referring Physician Dr. Bernal   Patient/Family Goals Statement Lessen back and leg pain.   Orders Evaluate and Treat   Insurance Type Medicare   Medical Diagnosis Midline LBP   Surgical/Medical history reviewed Yes   Precautions/Limitations no known precautions/limitations   Body Part(s)   Body Part(s) Lumbar Spine/SI   Presentation and Etiology   Pertinent history of current problem (include personal factors and/or comorbidities that impact the POC) Patient presents with a flare of his chronic LBP.  Also notes a slight pain into the left LE that is somewhat new.  Original injury was back in 1975 when a falling tree struck him.  He has been seen in PT in the past for this and tends to respond nicely, which allows him to get on with his normal activities.  No pain with cough or sneeze.  No LE weakness.  He has always been compliant with his HEP and also walks for exercise on a regular basis.   Impairments A. Pain;E. Decreased flexibility;F. Decreased strength and endurance;K. Numbness   Functional Limitations perform activities of daily living;perform desired leisure / sports activities   Symptom Location Lower back and into left LE   How/Where did it occur During contact with an object;At work   Onset date of current episode/exacerbation 05/31/75   Chronicity Chronic   Best (/10) 2   Worst (/10) 9   Pain quality A. Sharp   Frequency of pain/symptoms B. Intermittent   Pain/symptoms exacerbated by B. Walking;C. Lifting;D. Carrying;G. Certain positions;A. Sitting;M. Other   Pain exacerbation comment standing   Pain/symptoms eased by C. Rest;I. OTC medication(s)   Prior Level of Function   Prior Level of Function-Mobility independent   Prior Level of Function-ADLs independent   Current Level of Function   Patient role/employment history F. Retired   Living environment Apartment/condo   Home/community  accessibility No stairs   Current equipment-Gait/Locomotion None   Current equipment-ADL None   Fall Risk Screen   Fall screen completed by PT   Per patient - Fall 2 or more times in past year? No   Per patient - Fall with injury in past year? No   Is patient a fall risk? No   System Outcome Measures   Outcome Measures Low Back Pain (see Oswestry and Jelena)   Lumbar Spine/SI Objective Findings   Observation No distress   Posture Forward flexed at the hips   Gait/Locomotion normal   Flexion ROM Fingers to distal tibia   Extension ROM limited 75%   Lumbar/Hip/Knee/Foot Strength Comments 5/5   Hamstring Flexibility tight   Hip Flexor Flexibility tight   Quadricep Flexibility tight   Piriformis Flexibility tight   SLR negative   Palpation Tender from L3-L5.  No significant SI tenderness   Planned Therapy Interventions   Planned Therapy Interventions manual therapy;stretching   Planned Modality Interventions   Planned Modality Interventions Electrical stimulation;Traction   Clinical Impression   Criteria for Skilled Therapeutic Interventions Met yes, treatment indicated   PT Diagnosis Chronic LBP   Influenced by the following impairments pain, muscle tightness, loss of ROM, LE numbness   Functional limitations due to impairments stand, sit, sleep, bend, lift, carry   Clinical Presentation Stable/Uncomplicated   Clinical Presentation Rationale LIZANDRO   Clinical Decision Making (Complexity) Low complexity   Therapy Frequency 2 times/Week   Predicted Duration of Therapy Intervention (days/wks) total of 9 visits   Risk & Benefits of therapy have been explained Yes   Patient, Family & other staff in agreement with plan of care Yes   Clinical Impression Comments Patient is at low risk of persistent, disablin gLBP per Jelena and moderate disability on LIZANDRO.   Education Assessment   Barriers to Learning No barriers   Ortho Goal 1   Goal Identifier LTG 1   Goal Description Able to bend over to do light garden work with pain < 3/10.    Target Date 05/02/17   Ortho Goal 2   Goal Identifier LTG 2   Goal Description Walk > 1 mile for exercise with out increased pain.   Target Date 05/02/17   Total Evaluation Time   Total Evaluation Time 15   Therapy Certification   Certification date from 03/21/17   Certification date to 05/20/17   Medical Diagnosis Midline LBP   I certify the need for these services furnished under this plan of treatment and while under my care. (Physician co-signature of this document indicates review and certification of the therapy plan).

## 2017-03-24 ENCOUNTER — HOSPITAL ENCOUNTER (OUTPATIENT)
Dept: PHYSICAL THERAPY | Facility: HOSPITAL | Age: 82
Setting detail: THERAPIES SERIES
End: 2017-03-24
Attending: FAMILY MEDICINE
Payer: OTHER MISCELLANEOUS

## 2017-03-24 PROCEDURE — 97014 ELECTRIC STIMULATION THERAPY: CPT | Mod: GP

## 2017-03-24 PROCEDURE — 40000718 ZZHC STATISTIC PT DEPARTMENT ORTHO VISIT

## 2017-03-24 PROCEDURE — 97012 MECHANICAL TRACTION THERAPY: CPT | Mod: GP

## 2017-03-28 ENCOUNTER — HOSPITAL ENCOUNTER (OUTPATIENT)
Dept: PHYSICAL THERAPY | Facility: HOSPITAL | Age: 82
Setting detail: THERAPIES SERIES
End: 2017-03-28
Attending: FAMILY MEDICINE
Payer: OTHER MISCELLANEOUS

## 2017-03-28 PROCEDURE — 97140 MANUAL THERAPY 1/> REGIONS: CPT | Mod: GP

## 2017-03-28 PROCEDURE — 40000718 ZZHC STATISTIC PT DEPARTMENT ORTHO VISIT

## 2017-03-28 PROCEDURE — 97110 THERAPEUTIC EXERCISES: CPT | Mod: GP

## 2017-03-30 ENCOUNTER — HOSPITAL ENCOUNTER (OUTPATIENT)
Dept: PHYSICAL THERAPY | Facility: HOSPITAL | Age: 82
Setting detail: THERAPIES SERIES
End: 2017-03-30
Attending: FAMILY MEDICINE
Payer: OTHER MISCELLANEOUS

## 2017-03-30 PROCEDURE — 97014 ELECTRIC STIMULATION THERAPY: CPT | Mod: GP

## 2017-03-30 PROCEDURE — 40000718 ZZHC STATISTIC PT DEPARTMENT ORTHO VISIT

## 2017-03-30 PROCEDURE — 97012 MECHANICAL TRACTION THERAPY: CPT | Mod: GP

## 2017-04-04 ENCOUNTER — HOSPITAL ENCOUNTER (OUTPATIENT)
Dept: PHYSICAL THERAPY | Facility: HOSPITAL | Age: 82
Setting detail: THERAPIES SERIES
End: 2017-04-04
Attending: FAMILY MEDICINE
Payer: OTHER MISCELLANEOUS

## 2017-04-04 PROCEDURE — 40000718 ZZHC STATISTIC PT DEPARTMENT ORTHO VISIT

## 2017-04-04 PROCEDURE — 97012 MECHANICAL TRACTION THERAPY: CPT | Mod: GP

## 2017-04-04 PROCEDURE — 97014 ELECTRIC STIMULATION THERAPY: CPT | Mod: GP

## 2017-04-06 ENCOUNTER — HOSPITAL ENCOUNTER (OUTPATIENT)
Dept: PHYSICAL THERAPY | Facility: HOSPITAL | Age: 82
Setting detail: THERAPIES SERIES
End: 2017-04-06
Attending: FAMILY MEDICINE
Payer: OTHER MISCELLANEOUS

## 2017-04-06 PROCEDURE — 40000718 ZZHC STATISTIC PT DEPARTMENT ORTHO VISIT

## 2017-04-06 PROCEDURE — 97012 MECHANICAL TRACTION THERAPY: CPT | Mod: GP

## 2017-04-06 PROCEDURE — 97014 ELECTRIC STIMULATION THERAPY: CPT | Mod: GP

## 2017-04-11 ENCOUNTER — HOSPITAL ENCOUNTER (OUTPATIENT)
Dept: PHYSICAL THERAPY | Facility: HOSPITAL | Age: 82
Setting detail: THERAPIES SERIES
End: 2017-04-11
Attending: FAMILY MEDICINE
Payer: OTHER MISCELLANEOUS

## 2017-04-11 PROCEDURE — 40000718 ZZHC STATISTIC PT DEPARTMENT ORTHO VISIT

## 2017-04-11 PROCEDURE — 97012 MECHANICAL TRACTION THERAPY: CPT | Mod: GP

## 2017-04-11 PROCEDURE — 97014 ELECTRIC STIMULATION THERAPY: CPT | Mod: GP

## 2017-04-13 ENCOUNTER — HOSPITAL ENCOUNTER (OUTPATIENT)
Dept: PHYSICAL THERAPY | Facility: HOSPITAL | Age: 82
Setting detail: THERAPIES SERIES
End: 2017-04-13
Attending: FAMILY MEDICINE
Payer: OTHER MISCELLANEOUS

## 2017-04-13 PROCEDURE — 97014 ELECTRIC STIMULATION THERAPY: CPT | Mod: GP

## 2017-04-13 PROCEDURE — 40000718 ZZHC STATISTIC PT DEPARTMENT ORTHO VISIT

## 2017-04-13 PROCEDURE — 97012 MECHANICAL TRACTION THERAPY: CPT | Mod: GP

## 2017-04-17 ENCOUNTER — HOSPITAL ENCOUNTER (OUTPATIENT)
Dept: PHYSICAL THERAPY | Facility: HOSPITAL | Age: 82
Setting detail: THERAPIES SERIES
End: 2017-04-17
Attending: FAMILY MEDICINE
Payer: OTHER MISCELLANEOUS

## 2017-04-17 PROCEDURE — 40000718 ZZHC STATISTIC PT DEPARTMENT ORTHO VISIT

## 2017-04-17 PROCEDURE — 97012 MECHANICAL TRACTION THERAPY: CPT | Mod: GP

## 2017-04-17 PROCEDURE — 97014 ELECTRIC STIMULATION THERAPY: CPT | Mod: GP

## 2017-04-20 ENCOUNTER — HOSPITAL ENCOUNTER (OUTPATIENT)
Dept: PHYSICAL THERAPY | Facility: HOSPITAL | Age: 82
Setting detail: THERAPIES SERIES
End: 2017-04-20
Attending: FAMILY MEDICINE
Payer: OTHER MISCELLANEOUS

## 2017-04-20 PROCEDURE — 40000718 ZZHC STATISTIC PT DEPARTMENT ORTHO VISIT

## 2017-04-20 PROCEDURE — 97012 MECHANICAL TRACTION THERAPY: CPT | Mod: GP

## 2017-04-20 PROCEDURE — 97014 ELECTRIC STIMULATION THERAPY: CPT | Mod: GP

## 2017-04-24 ENCOUNTER — HOSPITAL ENCOUNTER (OUTPATIENT)
Dept: PHYSICAL THERAPY | Facility: HOSPITAL | Age: 82
Setting detail: THERAPIES SERIES
End: 2017-04-24
Attending: FAMILY MEDICINE
Payer: OTHER MISCELLANEOUS

## 2017-04-24 PROCEDURE — 40000718 ZZHC STATISTIC PT DEPARTMENT ORTHO VISIT

## 2017-04-24 PROCEDURE — 97012 MECHANICAL TRACTION THERAPY: CPT | Mod: GP

## 2017-04-24 PROCEDURE — 97014 ELECTRIC STIMULATION THERAPY: CPT | Mod: GP

## 2017-04-27 ENCOUNTER — HOSPITAL ENCOUNTER (OUTPATIENT)
Dept: PHYSICAL THERAPY | Facility: HOSPITAL | Age: 82
Setting detail: THERAPIES SERIES
End: 2017-04-27
Attending: FAMILY MEDICINE
Payer: OTHER MISCELLANEOUS

## 2017-04-27 PROCEDURE — G8979 MOBILITY GOAL STATUS: HCPCS | Mod: GP,CI

## 2017-04-27 PROCEDURE — G8980 MOBILITY D/C STATUS: HCPCS | Mod: GP,CI

## 2017-04-27 NOTE — PROGRESS NOTES
04/27/17 1130   The [facility] STarT Back Screening Tool (  [facility] 01/08/07, Funded by [employer]) Used with permission   1  My back pain has spread down my leg(s) at some time in the last 2 weeks (!) 1   2  I have had pain in the shoulder or neck at some time in the last 2 weeks 0   3  I have only walked short distances because of my back pain 0   4  In the last 2 weeks, I have dressed more slowly than usual because of back pain 0   5  It s not really safe for a person with a condition like mine to be physically active 0   6  Worrying thoughts have been going through my mind a lot of the time 0   7  I feel that my back pain is terrible and it s never going to get any better 0   8  In general I have not enjoyed all the things I used to enjoy 0   9.  Overall, how bothersome has your back pain been in the last 2 weeks? 0.0   The [facility] STarT Back Tool Scores    Sub-Score (Q5-9) 0   Total Score (all 9) 1   Risk: LOW

## 2017-04-27 NOTE — PROGRESS NOTES
Outpatient Physical Therapy Discharge Note     Patient: Kenyon Rivera  : 1934    Therapy Diagnosis: Chronic LBP     Client Self Report: Pleased with results.  Much easier time completing his daily tasks.  Less LBP and better mobility.    Outcome Measures (most recent score):  Jelena STarT Sub-Score (Q5-9): 0  Jelena STarT Total Score (all 9): 1  Oswestry Score (%): 16 %    Goals:  Goal Identifier LTG 1   Goal Description Able to bend over to do light garden work with pain < 3/10.   Target Date 17   Date Met      Progress:     Goal Identifier LTG 2   Goal Description Walk > 1 mile for exercise with out increased pain.   Target Date 17   Date Met      Progress:     Goal Identifier     Goal Description     Target Date     Date Met      Progress:     Goal Identifier     Goal Description     Target Date     Date Met      Progress:     Goal Identifier     Goal Description     Target Date     Date Met      Progress:     Goal Identifier     Goal Description     Target Date     Date Met      Progress:     Goal Identifier     Goal Description     Target Date     Date Met      Progress:     Goal Identifier     Goal Description     Target Date     Date Met      Progress:     Progress Toward Goals:   Progress this reporting period: goals met          Plan:  Discharge from therapy.    Discharge:    Reason for Discharge: Patient has met all goals.    Equipment Issued: none    Discharge Plan: Patient to continue home program.

## 2017-04-27 NOTE — PROGRESS NOTES
04/27/17 1131   Oswestry Disability Index (LIZANDRO   [employer] 1980 Version 2.1a, All rights reserved)   Section 1 - Pain intensity 1   Section 2 - Personal care (washing, dressing, etc.)  0   Section 3 - Lifting 3   Section 4 - Walking 0   Section 5 - Sitting 1   Section 6 - Standing 1   Section 7 - Sleeping 1   Section 8 - Sex life (if applicable) 0   Section 9 - Social life 0   Section 10 - Traveling 1   Sum 8   Count 10   Oswestry Score (%) 16 %

## 2017-05-03 ENCOUNTER — OFFICE VISIT (OUTPATIENT)
Dept: CHIROPRACTIC MEDICINE | Facility: OTHER | Age: 82
End: 2017-05-03
Attending: CHIROPRACTOR
Payer: COMMERCIAL

## 2017-05-03 DIAGNOSIS — M99.02 SEGMENTAL AND SOMATIC DYSFUNCTION OF THORACIC REGION: ICD-10-CM

## 2017-05-03 DIAGNOSIS — M99.03 SEGMENTAL AND SOMATIC DYSFUNCTION OF LUMBAR REGION: Primary | ICD-10-CM

## 2017-05-03 DIAGNOSIS — M54.50 ACUTE BILATERAL LOW BACK PAIN WITHOUT SCIATICA: ICD-10-CM

## 2017-05-03 PROCEDURE — 98940 CHIROPRACT MANJ 1-2 REGIONS: CPT | Mod: AT | Performed by: CHIROPRACTOR

## 2017-05-03 NOTE — MR AVS SNAPSHOT
After Visit Summary   5/3/2017    Kenyon Rivera    MRN: [id number]           Patient Information     Date Of Birth          5/31/1934        Visit Information        Provider Department      5/3/2017 11:20 AM Kenyon Syed DC  [facility]        Today's Diagnoses     Segmental and somatic dysfunction of lumbar region    -  1    Acute bilateral low back pain without sciatica        Segmental and somatic dysfunction of thoracic region           Follow-ups after your visit        Who to contact     If you have questions or need follow up information about today's clinic visit or your schedule please contact  [facility] directly at [phone].  Normal or non-critical lab and imaging results will be communicated to you by MyChart, letter or phone within 4 business days after the clinic has received the results. If you do not hear from us within 7 days, please contact the clinic through MyChart or phone. If you have a critical or abnormal lab result, we will notify you by phone as soon as possible.  Submit refill requests through Tr[employer]o or call your pharmacy and they will forward the refill request to us. Please allow 3 business days for your refill to be completed.          Additional Information About Your Visit        Care EveryWhere ID     This is your Care EveryWhere ID. This could be used by other organizations to access your [facility] medical records  [id number]         Blood Pressure from Last 3 Encounters:   03/15/17 132/64   02/27/17 126/66   10/31/16 130/66    Weight from Last 3 Encounters:   03/15/17 197 lb (89.4 kg)   02/27/17 200 lb (90.7 kg)   10/31/16 198 lb (89.8 kg)              We Performed the Following     CHIROPRAC MANIP,SPINAL,1-2 REGIONS        Primary Care Provider Office Phone # Fax #    Eric Bernal [phone]       [address] [address]        Thank you!     Thank you for choosing  [facility]  for your care. Our goal is always to provide you with excellent care. Hearing back from our patients is one way we can continue to improve our services. Please take a few minutes to complete the written survey that you may receive in the mail after your visit with us. Thank you!             Your Updated Medication List - Protect others around you: Learn how to safely use, store and throw away your medicines at www.disposemymeds.org.          This list is accurate as of: 5/3/17 11:59 PM.  Always use your most recent med list.                   Brand Name Dispense Instructions for use    acetaminophen 500 MG tablet    TYLENOL     Take 500-1,000 mg by mouth every 6 hours as needed       albuterol 108 (90 BASE) MCG/ACT Inhaler    PROAIR HFA/PROVENTIL HFA/VENTOLIN HFA     Inhale 2 puffs into the lungs every 4 hours as needed for shortness of breath / dyspnea or wheezing       aspirin 81 MG tablet      Take 81 mg by mouth daily       calcium carbonate 500 MG chewable tablet    TUMS     Take 1 chew tab by mouth every 4 hours as needed for heartburn       cetirizine 10 MG tablet    zyrTEC     Take 10 mg by mouth daily as needed for allergies       cyclobenzaprine 10 MG tablet    FLEXERIL    60 tablet    Take 1 tablet (10 mg) by mouth 2 times daily       Fish Oil 1200 MG Caps      Take by mouth daily Fish oil 1290 omega 3 900       FLOMAX 0.4 MG capsule   Generic drug:  tamsulosin      Take 1 capsule by mouth At Bedtime.       HYDROcodone-acetaminophen 5-325 MG per tablet    NORCO    60 tablet    Take 0.5 to 1 tablet by oral route every 8 hours as needed for pain       IBUPROFEN PO      Take 200 mg by mouth every 6 hours as needed for moderate pain       losartan 50 MG tablet    COZAAR     Take 1.5 tablets by mouth daily.       omeprazole 20 MG tablet      Take 1 tablet by mouth 2 times daily.       senna-docusate 8.6-50 MG per tablet    SENOKOT-S;PERICOLACE     Take 1 tablet by mouth daily        SIMETHICONE-80 PO      Take 80 mg by mouth every 4 hours as needed for intestinal gas       temazepam 30 MG capsule    RESTORIL     Take 1 capsule by mouth nightly as needed       vitamin D3 2000 UNITS Caps      Take by mouth daily       VOLTAREN 1 % Gel topical gel   Generic drug:  diclofenac      Apply 2 g topically every 6 hours as needed #2 tubes written by Dr Berumen and 2 refills.       ZOCOR 20 MG tablet   Generic drug:  simvastatin      Take 1 tablet by mouth daily.

## 2017-05-04 NOTE — PROGRESS NOTES
Subjective Finding:    Chief compalint: Patient presents with:  Back Pain  , Pain Scale: 5/10, Intensity: dull and ache, Duration: 2 weeks, Change since last visit: , Radiating: bilateral buttock.    Date of injury:     Activities that the pain restricts:   Home/household/hobbies/social activities: yes.  Work duties: yes.  Sleep: no.  Makes symptoms better: rest.  Makes symptoms worse: activity, lumbar extension and lumbar flexion.  Have you seen anyone else for the symptoms? no.  Work related: no  Automobile related injury: no.    Objective and Assessment:    Posture Analysis:   High shoulder: right.  Head tilt: right.  High iliac crest: right.  Head carriage: neutral.  Thoracic Kyphosis: neutral.  Lumbar Lordosis: forward.    Lumbar Range of Motion: flexion decreased, extension decreased, left lateral flexion decreased and right lateral flexion decreased.  Cervical Range of Motion: .  Thoracic Range of Motion: .  Extremity Range of Motion: .    Palpation:   Quad lumb: bilateral, referred pain: no    Segmental dysfunction pre-treatment: T10  L4-5  SI.     Assessment post-treatment:  Cervical: ROM increased.  Thoracic: ROM increased.  Lumbar: ROM increased, pain and tenderness decreased and muscle spasm decreased.    Comments: .      Complicating Factors: .    Plan / Procedure:    Expected release date: .  Treatment plan: 1 times per month.  Instructed patient: ice 20 minutes every other hour as needed and rest.  Short term goals: reduce pain.  Long term goals: restore normal function.  Prognosis: excellent.

## 2017-05-08 DIAGNOSIS — R25.9 ABNORMAL INVOLUNTARY MOVEMENT: ICD-10-CM

## 2017-05-10 RX ORDER — CYCLOBENZAPRINE HCL 10 MG
TABLET ORAL
Qty: 60 TABLET | Refills: 0 | Status: SHIPPED | OUTPATIENT
Start: 2017-05-10 | End: 2017-12-01

## 2017-05-10 NOTE — TELEPHONE ENCOUNTER
flexeril      Last Written Prescription Date: 1/6/17  Last Fill Quantity: 60,  # refills: 3   Last Office Visit with G, [facility] or [facility] prescribing provider: 3/15/17                                         Next 5 appointments (look out 90 days)     May 23, 2017  2:45 PM CDT   (Arrive by 2:30 PM)   SHORT with Eric Bernal MD   [facility] ([facility])    [address] [place] [address] [facility] [address]   [phone]

## 2017-05-23 ENCOUNTER — OFFICE VISIT (OUTPATIENT)
Dept: FAMILY MEDICINE | Facility: OTHER | Age: 82
End: 2017-05-23
Attending: FAMILY MEDICINE
Payer: OTHER MISCELLANEOUS

## 2017-05-23 VITALS
WEIGHT: 198 LBS | BODY MASS INDEX: 31.48 KG/M2 | RESPIRATION RATE: 18 BRPM | SYSTOLIC BLOOD PRESSURE: 132 MMHG | HEART RATE: 103 BPM | TEMPERATURE: 97.2 F | DIASTOLIC BLOOD PRESSURE: 64 MMHG | OXYGEN SATURATION: 96 %

## 2017-05-23 DIAGNOSIS — M54.50 CHRONIC MIDLINE LOW BACK PAIN WITHOUT SCIATICA: Primary | ICD-10-CM

## 2017-05-23 DIAGNOSIS — G89.29 CHRONIC MIDLINE LOW BACK PAIN WITHOUT SCIATICA: Primary | ICD-10-CM

## 2017-05-23 PROCEDURE — 99213 OFFICE O/P EST LOW 20 MIN: CPT | Performed by: FAMILY MEDICINE

## 2017-05-23 ASSESSMENT — PAIN SCALES - GENERAL: PAINLEVEL: MODERATE PAIN (4)

## 2017-05-23 NOTE — NURSING NOTE
"Chief Complaint   Patient presents with     Work Comp     back pain       Initial /64 (BP Location: Left arm, Patient Position: Chair, Cuff Size: Adult Large)  Pulse 103  Temp 97.2  F (36.2  C) (Tympanic)  Resp 18  Wt 198 lb (89.8 kg)  SpO2 96%  BMI 31.48 kg/m2 Estimated body mass index is 31.48 kg/(m^2) as calculated from the following:    Height as of 3/15/17: 5' 6.5\" (1.689 m).    Weight as of this encounter: 198 lb (89.8 kg).  Medication Reconciliation: complete   Lesvia Prado    "

## 2017-05-23 NOTE — MR AVS SNAPSHOT
After Visit Summary   5/23/2017    Kenyon Rivera    MRN: [id number]           Patient Information     Date Of Birth          5/31/1934        Visit Information        Provider Department      5/23/2017 2:45 PM Eric Bernal MD [facility]        Today's Diagnoses     Chronic midline low back pain without sciatica    -  1      Care Instructions    F/u with ongoing concerns.         Follow-ups after your visit        Who to contact     If you have questions or need follow up information about today's clinic visit or your schedule please contact [facility] directly at [phone].  Normal or non-critical lab and imaging results will be communicated to you by MyChart, letter or phone within 4 business days after the clinic has received the results. If you do not hear from us within 7 days, please contact the clinic through Ontuitivehart or phone. If you have a critical or abnormal lab result, we will notify you by phone as soon as possible.  Submit refill requests through [employer]idePay or call your pharmacy and they will forward the refill request to us. Please allow 3 business days for your refill to be completed.          Additional Information About Your Visit        Care EveryWhere ID     This is your Care EveryWhere ID. This could be used by other organizations to access your [place] medical records  [id number]        Your Vitals Were     Pulse Temperature Respirations Pulse Oximetry BMI (Body Mass Index)       103 97.2  F (36.2  C) (Tympanic) 18 96% 31.48 kg/m2        Blood Pressure from Last 3 Encounters:   05/23/17 132/64   03/15/17 132/64   02/27/17 126/66    Weight from Last 3 Encounters:   05/23/17 198 lb (89.8 kg)   03/15/17 197 lb (89.4 kg)   02/27/17 200 lb (90.7 kg)              Today, you had the following     No orders found for display       Primary Care Provider Office Phone # Fax #    Eric Bernal [phone]       [facility] [address] [facility] [address]        Thank you!     Thank you for choosing [facility]  for your care. Our goal is always to provide you with excellent care. Hearing back from our patients is one way we can continue to improve our services. Please take a few minutes to complete the written survey that you may receive in the mail after your visit with us. Thank you!             Your Updated Medication List - Protect others around you: Learn how to safely use, store and throw away your medicines at www.disposemymeds.org.          This list is accurate as of: 5/23/17  2:51 PM.  Always use your most recent med list.                   Brand Name Dispense Instructions for use    acetaminophen 500 MG tablet    TYLENOL     Take 500-1,000 mg by mouth every 6 hours as needed       albuterol 108 (90 BASE) MCG/ACT Inhaler    PROAIR HFA/PROVENTIL HFA/VENTOLIN HFA     Inhale 2 puffs into the lungs every 4 hours as needed for shortness of breath / dyspnea or wheezing       aspirin 81 MG tablet      Take 81 mg by mouth daily       calcium carbonate 500 MG chewable tablet    TUMS     Take 1 chew tab by mouth every 4 hours as needed for heartburn       cetirizine 10 MG tablet    zyrTEC     Take 10 mg by mouth daily as needed for allergies       cyclobenzaprine 10 MG tablet    FLEXERIL    60 tablet    TAKE 1 TABLET(10 MG) BY MOUTH TWICE DAILY       Fish Oil 1200 MG Caps      Take by mouth daily Fish oil 1290 omega 3 900       FLOMAX 0.4 MG capsule   Generic drug:  tamsulosin      Take 1 capsule by mouth At Bedtime.       HYDROcodone-acetaminophen 5-325 MG per tablet    NORCO    60 tablet    Take 0.5 to 1 tablet by oral route every 8 hours as needed for pain       IBUPROFEN PO      Take 200 mg by mouth every 6 hours as needed for moderate pain       losartan 50 MG tablet    COZAAR     Take 1.5 tablets by mouth daily.       omeprazole 20 MG tablet      Take 1 tablet by mouth 2 times daily.       senna-docusate  8.6-50 MG per tablet    SENOKOT-S;PERICOLACE     Take 1 tablet by mouth daily       SIMETHICONE-80 PO      Take 80 mg by mouth every 4 hours as needed for intestinal gas       temazepam 30 MG capsule    RESTORIL     Take 1 capsule by mouth nightly as needed       vitamin D3 2000 UNITS Caps      Take by mouth daily       VOLTAREN 1 % Gel topical gel   Generic drug:  diclofenac      Apply 2 g topically every 6 hours as needed #2 tubes written by Dr Berumen and 2 refills.       ZOCOR 20 MG tablet   Generic drug:  simvastatin      Take 1 tablet by mouth daily.

## 2017-05-23 NOTE — PROGRESS NOTES
Occupational Visit     Subjective:  Kenyon Rivera, 82 year old, male is seen Dr. Bernal.  The date of injury is 05/31/1975.  The patient was employed at [employer].   Ongoing low back pain.  Reliable use of meds.  Wondering about sleep, and pain.  We discussed.  He has a lot of pain overnight and it compromises his sleep.  He would like to try a back brace.  He remains active.  The meds help his adl's.        Allergies   Allergen Reactions     Chlorzoxazone      Parafon Forte         Review of Systems:  No red flag sx.        OBJECTIVE:  Vitals: B/P: 132/64, T: 97.2, P: 103, R: 18      Exam:  Gait is normal.  Back non tender.  Rises from chair slowly favoring low back.       Labs:none.       ASSESSMENT/PLAN:    (M54.5,  G89.29) Chronic midline low back pain without sciatica  (primary encounter diagnosis)  Comment: ongoing  Plan: reliable patient.  Appropriate meds without change today.  I wrote a script for the back brace.  F/u q 6 months.  No change in activities.

## 2017-05-30 ENCOUNTER — TELEPHONE (OUTPATIENT)
Dept: FAMILY MEDICINE | Facility: OTHER | Age: 82
End: 2017-05-30

## 2017-05-30 NOTE — TELEPHONE ENCOUNTER
3:03 PM    Reason for Call: OVERBOOK    Patient is having the following symptoms: Sleeping issues (stated due to pain med for WC issue) for ongoing days.    The patient is requesting an appointment for ASAP with Dr Bernal.    Was an appointment offered for this call? Yes we scheduled pt for Monday but asked to see if nurse could get him in sooner. He is only getting 2 hours of sleep a night.    Preferred method for responding to this message: Telephone Call [phone]    If we cannot reach you directly, may we leave a detailed response at the number you provided? Yes    Can this message wait until your PCP/provider returns, if unavailable today? Not applicable    Mary Grace Aleman

## 2017-05-31 ENCOUNTER — TRANSFERRED RECORDS (OUTPATIENT)
Dept: HEALTH INFORMATION MANAGEMENT | Facility: HOSPITAL | Age: 82
End: 2017-05-31

## 2017-06-01 ENCOUNTER — TELEPHONE (OUTPATIENT)
Dept: FAMILY MEDICINE | Facility: OTHER | Age: 82
End: 2017-06-01

## 2017-06-01 ENCOUNTER — OFFICE VISIT (OUTPATIENT)
Dept: FAMILY MEDICINE | Facility: OTHER | Age: 82
End: 2017-06-01
Attending: FAMILY MEDICINE
Payer: OTHER MISCELLANEOUS

## 2017-06-01 VITALS
SYSTOLIC BLOOD PRESSURE: 133 MMHG | HEIGHT: 67 IN | RESPIRATION RATE: 16 BRPM | TEMPERATURE: 98.3 F | DIASTOLIC BLOOD PRESSURE: 86 MMHG | WEIGHT: 198 LBS | OXYGEN SATURATION: 95 % | BODY MASS INDEX: 31.08 KG/M2 | HEART RATE: 104 BPM

## 2017-06-01 DIAGNOSIS — M54.50 CHRONIC MIDLINE LOW BACK PAIN WITHOUT SCIATICA: Primary | ICD-10-CM

## 2017-06-01 DIAGNOSIS — G89.29 CHRONIC MIDLINE LOW BACK PAIN WITHOUT SCIATICA: Primary | ICD-10-CM

## 2017-06-01 PROCEDURE — 99213 OFFICE O/P EST LOW 20 MIN: CPT | Performed by: FAMILY MEDICINE

## 2017-06-01 ASSESSMENT — PAIN SCALES - GENERAL: PAINLEVEL: MODERATE PAIN (4)

## 2017-06-01 NOTE — TELEPHONE ENCOUNTER
I received a call from [facility] they received a referral from the pt's work comp carrier regarding the back brace you recommended at the pt's 05/23/17 appt. They would like to get an Rx for this.

## 2017-06-01 NOTE — MR AVS SNAPSHOT
After Visit Summary   6/1/2017    Kenyon Rivera    MRN: [id number]           Patient Information     Date Of Birth          5/31/1934        Visit Information        Provider Department      6/1/2017 1:45 PM Eric Bernal MD [facility]        Today's Diagnoses     Chronic midline low back pain without sciatica    -  1       Follow-ups after your visit        Additional Services     CHIROPRACTIC REFERRAL       Your provider has referred you to: Dr. Syed:  Up to 6 treatments through the rest of the year.      Please be aware that coverage of these services is subject to the terms and limitations of your health insurance plan.  Call member services at your health plan with any benefit or coverage questions.      Please bring the following to your appointment:    >>   Any x-rays, CTs or MRIs which have been performed.  Contact the facility where they were done to arrange for  prior to your scheduled appointment.    >>   List of current medications   >>   This referral request   >>   Any documents/labs given to you for this referral                  Who to contact     If you have questions or need follow up information about today's clinic visit or your schedule please contact [facility] directly at [phone].  Normal or non-critical lab and imaging results will be communicated to you by MyChart, letter or phone within 4 business days after the clinic has received the results. If you do not hear from us within 7 days, please contact the clinic through Owlparrothart or phone. If you have a critical or abnormal lab result, we will notify you by phone as soon as possible.  Submit refill requests through [employer] or call your pharmacy and they will forward the refill request to us. Please allow 3 business days for your refill to be completed.          Additional Information About Your Visit        Owlparrothart Information     [employer] lets you send messages to your  "doctor, view your test results, renew your prescriptions, schedule appointments and more. To sign up, go to [email or web address] . Click on \"Log in\" on the left side of the screen, which will take you to the Welcome page. Then click on \"Sign up Now\" on the right side of the page.     You will be asked to enter the access code listed below, as well as some personal information. Please follow the directions to create your username and password.     Your access code is: [email or web address]  Expires: 2017  1:46 PM     Your access code will  in 90 days. If you need help or a new code, please call your [facility] or [phone].        Care EveryWhere ID     This is your Care EveryWhere ID. This could be used by other organizations to access your [place] medical records  [id number]        Your Vitals Were     Pulse Temperature Respirations Height Pulse Oximetry BMI (Body Mass Index)    104 98.3  F (36.8  C) (Tympanic) 16 5' 6.5\" (1.689 m) 95% 31.48 kg/m2       Blood Pressure from Last 3 Encounters:   17 133/86   17 132/64   03/15/17 132/64    Weight from Last 3 Encounters:   17 198 lb (89.8 kg)   17 198 lb (89.8 kg)   03/15/17 197 lb (89.4 kg)              We Performed the Following     CHIROPRACTIC REFERRAL        Primary Care Provider Office Phone # Fax #    Eric Bernal [phone]       [address] [address]        Thank you!     Thank you for choosing [facility]  for your care. Our goal is always to provide you with excellent care. Hearing back from our patients is one way we can continue to improve our services. Please take a few minutes to complete the written survey that you may receive in the mail after your visit with us. Thank you!             Your Updated Medication List - Protect others around you: Learn how to safely use, store and throw away your medicines at www.disposemymeds.org.          This " list is accurate as of: 6/1/17  1:46 PM.  Always use your most recent med list.                   Brand Name Dispense Instructions for use    acetaminophen 500 MG tablet    TYLENOL     Take 500-1,000 mg by mouth every 6 hours as needed       albuterol 108 (90 BASE) MCG/ACT Inhaler    PROAIR HFA/PROVENTIL HFA/VENTOLIN HFA     Inhale 2 puffs into the lungs every 4 hours as needed for shortness of breath / dyspnea or wheezing       aspirin 81 MG tablet      Take 81 mg by mouth daily       calcium carbonate 500 MG chewable tablet    TUMS     Take 1 chew tab by mouth every 4 hours as needed for heartburn       cetirizine 10 MG tablet    zyrTEC     Take 10 mg by mouth daily as needed for allergies       cyclobenzaprine 10 MG tablet    FLEXERIL    60 tablet    TAKE 1 TABLET(10 MG) BY MOUTH TWICE DAILY       Fish Oil 1200 MG Caps      Take by mouth daily Fish oil 1290 omega 3 900       FLOMAX 0.4 MG capsule   Generic drug:  tamsulosin      Take 1 capsule by mouth At Bedtime.       HYDROcodone-acetaminophen 5-325 MG per tablet    NORCO    60 tablet    Take 0.5 to 1 tablet by oral route every 8 hours as needed for pain       IBUPROFEN PO      Take 200 mg by mouth every 6 hours as needed for moderate pain       losartan 50 MG tablet    COZAAR     Take 1.5 tablets by mouth daily.       omeprazole 20 MG tablet      Take 1 tablet by mouth 2 times daily.       senna-docusate 8.6-50 MG per tablet    SENOKOT-S;PERICOLACE     Take 1 tablet by mouth daily       SIMETHICONE-80 PO      Take 80 mg by mouth every 4 hours as needed for intestinal gas       temazepam 30 MG capsule    RESTORIL     Take 1 capsule by mouth nightly as needed       TRAZODONE HCL PO      Take 50 mg by mouth 1-2 tabs at HS prn       vitamin D3 2000 UNITS Caps      Take by mouth daily       VOLTAREN 1 % Gel topical gel   Generic drug:  diclofenac      Apply 2 g topically every 6 hours as needed #2 tubes written by Dr Berumen and 2 refills.       ZOCOR 20 MG tablet    Generic drug:  simvastatin      Take 1 tablet by mouth daily.

## 2017-06-01 NOTE — NURSING NOTE
"Chief Complaint   Patient presents with     Work Comp       Initial /86 (BP Location: Right arm, Patient Position: Chair, Cuff Size: Adult Large)  Pulse 104  Temp 98.3  F (36.8  C) (Tympanic)  Resp 16  Ht 5' 6.5\" (1.689 m)  Wt 198 lb (89.8 kg)  SpO2 95%  BMI 31.48 kg/m2 Estimated body mass index is 31.48 kg/(m^2) as calculated from the following:    Height as of this encounter: 5' 6.5\" (1.689 m).    Weight as of this encounter: 198 lb (89.8 kg).  Medication Reconciliation: complete   Orly Domingo    "

## 2017-06-01 NOTE — PROGRESS NOTES
"Occupational Visit     Subjective:  Kenyon Rivera, 83 year old, male is seen today for a work comp FU. Pt has chronic midline LBP.  The date of injury is 05/31/1975.  The patient was employed at [employer].  He would like to see chiropractor.  He has had good luck from this in the past.        Allergies   Allergen Reactions     Chlorzoxazone      Parafon Forte         Review of Systems:  Stable symptoms without change.        OBJECTIVE:  /86 (BP Location: Right arm, Patient Position: Chair, Cuff Size: Adult Large)  Pulse 104  Temp 98.3  F (36.8  C) (Tympanic)  Resp 16  Ht 5' 6.5\" (1.689 m)  Wt 198 lb (89.8 kg)  SpO2 95%  BMI 31.48 kg/m2        Exam:  Walking with a limp favoring back.       Labs:none.       ASSESSMENT/PLAN:    (M54.5,  G89.29) Chronic midline low back pain without sciatica  (primary encounter diagnosis)  Comment: reviewed.    Plan: he would like to see chiropractic. I did the referral.  He has had benefit with this.          "

## 2017-06-07 ENCOUNTER — TRANSFERRED RECORDS (OUTPATIENT)
Dept: HEALTH INFORMATION MANAGEMENT | Facility: HOSPITAL | Age: 82
End: 2017-06-07

## 2017-06-08 ENCOUNTER — TELEPHONE (OUTPATIENT)
Dept: FAMILY MEDICINE | Facility: OTHER | Age: 82
End: 2017-06-08

## 2017-06-13 ENCOUNTER — OFFICE VISIT (OUTPATIENT)
Dept: FAMILY MEDICINE | Facility: OTHER | Age: 82
End: 2017-06-13
Attending: FAMILY MEDICINE
Payer: OTHER MISCELLANEOUS

## 2017-06-13 VITALS
WEIGHT: 195 LBS | OXYGEN SATURATION: 94 % | DIASTOLIC BLOOD PRESSURE: 64 MMHG | SYSTOLIC BLOOD PRESSURE: 152 MMHG | BODY MASS INDEX: 31 KG/M2 | TEMPERATURE: 97.4 F | HEART RATE: 113 BPM | RESPIRATION RATE: 24 BRPM

## 2017-06-13 DIAGNOSIS — M54.40 BILATERAL LOW BACK PAIN WITH SCIATICA, SCIATICA LATERALITY UNSPECIFIED, UNSPECIFIED CHRONICITY: Primary | ICD-10-CM

## 2017-06-13 PROCEDURE — 99213 OFFICE O/P EST LOW 20 MIN: CPT | Performed by: FAMILY MEDICINE

## 2017-06-13 RX ORDER — TRAMADOL HYDROCHLORIDE 50 MG/1
50 TABLET ORAL EVERY 8 HOURS PRN
Qty: 60 TABLET | Refills: 0 | Status: SHIPPED | OUTPATIENT
Start: 2017-06-13 | End: 2017-08-18

## 2017-06-13 ASSESSMENT — PAIN SCALES - GENERAL: PAINLEVEL: MILD PAIN (3)

## 2017-06-13 NOTE — PROGRESS NOTES
Occupational Visit     Subjective:  Kenyon Rivera, 83 year old, male is seen 06/13/2017.  The date of injury is 05/31/1975.  The patient is employed at [employer].  Ongoing LBP.  lortab works but keeps him up no matter how early in the day he takes it.  We discussed options.        Allergies   Allergen Reactions     Chlorzoxazone      Parafon Forte         Review of Systems:  Severe insomnia and ongoing back pain.       OBJECTIVE:  Vitals: /64  Pulse 113  Temp 97.4  F (36.3  C) (Tympanic)  Resp 24  Wt 195 lb (88.5 kg)  SpO2 94%  BMI 31 kg/m2    Gait is normal.  Affect is appropriate.              Labs:none      ASSESSMENT/PLAN:    (M54.40) Bilateral low back pain with sciatica, sciatica laterality unspecified, unspecified chronicity  (primary encounter diagnosis)  Comment: ongoing, not controlled.   Plan: traMADol (ULTRAM) 50 MG tablet        Insomnia from the lortab.  Change to ultram as a trial.  F/u with ongoing concerns.  Reaffirm referral to chiropractic.

## 2017-06-13 NOTE — MR AVS SNAPSHOT
"              After Visit Summary   2017    Kenyon Rivera    MRN: [id number]           Patient Information     Date Of Birth          1934        Visit Information        Provider Department      2017 10:30 AM Eric Bernal MD [facility]        Today's Diagnoses     Bilateral low back pain with sciatica, sciatica laterality unspecified, unspecified chronicity    -  1      Care Instructions    F/ with ongoing concerns.           Follow-ups after your visit        Who to contact     If you have questions or need follow up information about today's clinic visit or your schedule please contact [facility] directly at [phone].  Normal or non-critical lab and imaging results will be communicated to you by Touch [employer] Class[employer], letter or phone within 4 business days after the clinic has received the results. If you do not hear from us within 7 days, please contact the clinic through [employer] or phone. If you have a critical or abnormal lab result, we will notify you by phone as soon as possible.  Submit refill requests through [employer]jit or call your pharmacy and they will forward the refill request to us. Please allow 3 business days for your refill to be completed.          Additional Information About Your Visit        MyChart Information     FREEjit lets you send messages to your doctor, view your test results, renew your prescriptions, schedule appointments and more. To sign up, go to [email or web address] . Click on \"Log in\" on the left side of the screen, which will take you to the Welcome page. Then click on \"Sign up Now\" on the right side of the page.     You will be asked to enter the access code listed below, as well as some personal information. Please follow the directions to create your username and password.     Your access code is: [email or web address]  Expires: 2017  1:46 PM     Your access code will  in 90 days. If you need help or a new code, please call your " [facility] or [phone].        Care EveryWhere ID     This is your Care EveryWhere ID. This could be used by other organizations to access your [place] medical records  [id number]        Your Vitals Were     Pulse Temperature Respirations Pulse Oximetry BMI (Body Mass Index)       113 97.4  F (36.3  C) (Tympanic) 24 94% 31 kg/m2        Blood Pressure from Last 3 Encounters:   06/13/17 152/64   06/01/17 133/86   05/23/17 132/64    Weight from Last 3 Encounters:   06/13/17 195 lb (88.5 kg)   06/01/17 198 lb (89.8 kg)   05/23/17 198 lb (89.8 kg)              Today, you had the following     No orders found for display         Today's Medication Changes          These changes are accurate as of: 6/13/17 11:01 AM.  If you have any questions, ask your nurse or doctor.               Start taking these medicines.        Dose/Directions    traMADol 50 MG tablet   Commonly known as:  ULTRAM   Used for:  Bilateral low back pain with sciatica, sciatica laterality unspecified, unspecified chronicity   Started by:  Eric Bernal MD        Dose:  50 mg   Take 1 tablet (50 mg) by mouth every 8 hours as needed for pain   Quantity:  60 tablet   Refills:  0         Stop taking these medicines if you haven't already. Please contact your care team if you have questions.     HYDROcodone-acetaminophen 5-325 MG per tablet   Commonly known as:  NORCO   Stopped by:  Eric Bernal MD           TRAZODONE HCL PO   Stopped by:  Eric Bernal MD                Where to get your medicines      Some of these will need a paper prescription and others can be bought over the counter.  Ask your nurse if you have questions.     Bring a paper prescription for each of these medications     traMADol 50 MG tablet                Primary Care Provider Office Phone # Fax #    Eric Bernal [phone]       [address] [address]        Thank you!     Thank you for choosing  [facility]  for your care. Our goal is always to provide you with excellent care. Hearing back from our patients is one way we can continue to improve our services. Please take a few minutes to complete the written survey that you may receive in the mail after your visit with us. Thank you!             Your Updated Medication List - Protect others around you: Learn how to safely use, store and throw away your medicines at www.disposemymeds.org.          This list is accurate as of: 6/13/17 11:01 AM.  Always use your most recent med list.                   Brand Name Dispense Instructions for use    acetaminophen 500 MG tablet    TYLENOL     Take 500-1,000 mg by mouth every 6 hours as needed       albuterol 108 (90 BASE) MCG/ACT Inhaler    PROAIR HFA/PROVENTIL HFA/VENTOLIN HFA     Inhale 2 puffs into the lungs every 4 hours as needed for shortness of breath / dyspnea or wheezing       aspirin 81 MG tablet      Take 81 mg by mouth daily       calcium carbonate 500 MG chewable tablet    TUMS     Take 1 chew tab by mouth every 4 hours as needed for heartburn       cetirizine 10 MG tablet    zyrTEC     Take 10 mg by mouth daily as needed for allergies       cyclobenzaprine 10 MG tablet    FLEXERIL    60 tablet    TAKE 1 TABLET(10 MG) BY MOUTH TWICE DAILY       Fish Oil 1200 MG Caps      Take by mouth daily Fish oil 1290 omega 3 900       FLOMAX 0.4 MG capsule   Generic drug:  tamsulosin      Take 1 capsule by mouth At Bedtime.       IBUPROFEN PO      Take 200 mg by mouth every 6 hours as needed for moderate pain       losartan 50 MG tablet    COZAAR     Take 1.5 tablets by mouth daily.       omeprazole 20 MG tablet      Take 1 tablet by mouth 2 times daily.       order for DME     1 each    Elastic Back brace with stays and velcro enclosure       senna-docusate 8.6-50 MG per tablet    SENOKOT-S;PERICOLACE     Take 1 tablet by mouth daily       SIMETHICONE-80 PO      Take 80 mg by mouth every 4 hours as  needed for intestinal gas       temazepam 30 MG capsule    RESTORIL     Take 1 capsule by mouth nightly as needed       traMADol 50 MG tablet    ULTRAM    60 tablet    Take 1 tablet (50 mg) by mouth every 8 hours as needed for pain       vitamin D3 2000 UNITS Caps      Take by mouth daily       VOLTAREN 1 % Gel topical gel   Generic drug:  diclofenac      Apply 2 g topically every 6 hours as needed #2 tubes written by Dr Berumen and 2 refills.       ZOCOR 20 MG tablet   Generic drug:  simvastatin      Take 1 tablet by mouth daily.

## 2017-06-13 NOTE — NURSING NOTE
"Chief Complaint   Patient presents with     Musculoskeletal Problem     Follow up work comp injury 05/31/1975, [employer]. Needed to stop norco and trazadone.        Initial /64  Pulse 113  Temp 97.4  F (36.3  C) (Tympanic)  Resp 24  Wt 195 lb (88.5 kg)  SpO2 94%  BMI 31 kg/m2 Estimated body mass index is 31 kg/(m^2) as calculated from the following:    Height as of 6/1/17: 5' 6.5\" (1.689 m).    Weight as of this encounter: 195 lb (88.5 kg).  Medication Reconciliation: complete   Suzi Cintron      "

## 2017-06-15 ENCOUNTER — TELEPHONE (OUTPATIENT)
Dept: FAMILY MEDICINE | Facility: OTHER | Age: 82
End: 2017-06-15

## 2017-06-16 NOTE — TELEPHONE ENCOUNTER
She is the pt's work comp carrier. She would like to verify the pt is not taking both Ultram and Norco and would like to know if the pt has had a UDS. I called her back and left a message.

## 2017-07-12 ENCOUNTER — OFFICE VISIT (OUTPATIENT)
Dept: CHIROPRACTIC MEDICINE | Facility: OTHER | Age: 82
End: 2017-07-12
Attending: CHIROPRACTOR
Payer: OTHER MISCELLANEOUS

## 2017-07-12 DIAGNOSIS — M99.03 SEGMENTAL AND SOMATIC DYSFUNCTION OF LUMBAR REGION: Primary | ICD-10-CM

## 2017-07-12 DIAGNOSIS — M99.02 SEGMENTAL AND SOMATIC DYSFUNCTION OF THORACIC REGION: ICD-10-CM

## 2017-07-12 DIAGNOSIS — M54.50 ACUTE BILATERAL LOW BACK PAIN WITHOUT SCIATICA: ICD-10-CM

## 2017-07-12 PROCEDURE — 98940 CHIROPRACT MANJ 1-2 REGIONS: CPT | Performed by: CHIROPRACTOR

## 2017-07-12 NOTE — MR AVS SNAPSHOT
"              After Visit Summary   2017    Kenyon Rivera    MRN: [id number]           Patient Information     Date Of Birth          1934        Visit Information        Provider Department      2017 1:30 PM Kenyon Syed [facility]  [facility]        Today's Diagnoses     Segmental and somatic dysfunction of lumbar region    -  1    Acute bilateral low back pain without sciatica        Segmental and somatic dysfunction of thoracic region           Follow-ups after your visit        Who to contact     If you have questions or need follow up information about today's clinic visit or your schedule please contact  [facility] directly at [phone].  Normal or non-critical lab and imaging results will be communicated to you by [employer], letter or phone within 4 business days after the clinic has received the results. If you do not hear from us within 7 days, please contact the clinic through [employer] or phone. If you have a critical or abnormal lab result, we will notify you by phone as soon as possible.  Submit refill requests through [employer] or call your pharmacy and they will forward the refill request to us. Please allow 3 business days for your refill to be completed.          Additional Information About Your Visit        MyChart Information     [employer] lets you send messages to your doctor, view your test results, renew your prescriptions, schedule appointments and more. To sign up, go to [email or web address] . Click on \"Log in\" on the left side of the screen, which will take you to the Welcome page. Then click on \"Sign up Now\" on the right side of the page.     You will be asked to enter the access code listed below, as well as some personal information. Please follow the directions to create your username and password.     Your access code is: [email or web address]  Expires: 2017  1:46 PM     Your access code will  in 90 days. If you need help or a new code, please call " your [place] clinic or [phone].        Care EveryWhere ID     This is your Care EveryWhere ID. This could be used by other organizations to access your [place] medical records  [id number]         Blood Pressure from Last 3 Encounters:   06/13/17 152/64   06/01/17 133/86   05/23/17 132/64    Weight from Last 3 Encounters:   06/13/17 195 lb (88.5 kg)   06/01/17 198 lb (89.8 kg)   05/23/17 198 lb (89.8 kg)              We Performed the Following     CHIROPRAC MANIP,SPINAL,1-2 REGIONS        Primary Care Provider Office Phone # Fax #    Eric Bernal [phone]       [address]  [facility] [place] [address]        Equal Access to Services     [facility]: Hadii aad ku hadasho Soomaali, waaxda luqadaha, qaybta kaalmada adeegyada, waxay dennisein hayaan hernán day . So [facility] [phone].    ATENCIÓN: Si habla español, tiene a estrella disposición servicios gratuitos de asistencia lingüística. Zachary al [phone].    We comply with applicable federal civil rights laws and [place] laws. We do not discriminate on the basis of race, color, national origin, age, disability sex, sexual orientation or gender identity.            Thank you!     Thank you for choosing  [facility]  for your care. Our goal is always to provide you with excellent care. Hearing back from our patients is one way we can continue to improve our services. Please take a few minutes to complete the written survey that you may receive in the mail after your visit with us. Thank you!             Your Updated Medication List - Protect others around you: Learn how to safely use, store and throw away your medicines at www.disposemymeds.org.          This list is accurate as of: 7/12/17 11:59 PM.  Always use your most recent med list.                   Brand Name Dispense Instructions for use Diagnosis    acetaminophen 500 MG tablet    TYLENOL     Take 500-1,000 mg by mouth every 6 hours as needed         albuterol 108 (90 BASE) MCG/ACT Inhaler    PROAIR HFA/PROVENTIL HFA/VENTOLIN HFA     Inhale 2 puffs into the lungs every 4 hours as needed for shortness of breath / dyspnea or wheezing        aspirin 81 MG tablet      Take 81 mg by mouth daily        calcium carbonate 500 MG chewable tablet    TUMS     Take 1 chew tab by mouth every 4 hours as needed for heartburn        cetirizine 10 MG tablet    zyrTEC     Take 10 mg by mouth daily as needed for allergies        cyclobenzaprine 10 MG tablet    FLEXERIL    60 tablet    TAKE 1 TABLET(10 MG) BY MOUTH TWICE DAILY    Abnormal involuntary movement       Fish Oil 1200 MG Caps      Take by mouth daily Fish oil 1290 omega 3 900        FLOMAX 0.4 MG capsule   Generic drug:  tamsulosin      Take 1 capsule by mouth At Bedtime.        IBUPROFEN PO      Take 200 mg by mouth every 6 hours as needed for moderate pain        losartan 50 MG tablet    COZAAR     Take 1.5 tablets by mouth daily.        omeprazole 20 MG tablet      Take 1 tablet by mouth 2 times daily.        order for DME     1 each    Elastic Back brace with stays and velcro enclosure    Chronic midline low back pain without sciatica       senna-docusate 8.6-50 MG per tablet    SENOKOT-S;PERICOLACE     Take 1 tablet by mouth daily        SIMETHICONE-80 PO      Take 80 mg by mouth every 4 hours as needed for intestinal gas        temazepam 30 MG capsule    RESTORIL     Take 1 capsule by mouth nightly as needed        traMADol 50 MG tablet    ULTRAM    60 tablet    Take 1 tablet (50 mg) by mouth every 8 hours as needed for pain    Bilateral low back pain with sciatica, sciatica laterality unspecified, unspecified chronicity       vitamin D3 2000 UNITS Caps      Take by mouth daily        VOLTAREN 1 % Gel topical gel   Generic drug:  diclofenac      Apply 2 g topically every 6 hours as needed #2 tubes written by Dr Berumen and 2 refills.        ZOCOR 20 MG tablet   Generic drug:  simvastatin      Take 1 tablet by  mouth daily.

## 2017-08-05 NOTE — PROGRESS NOTES
Outpatient Physical Therapy Discharge Note     Patient: Kenyon Rivera  : 1934    Beginning/End Dates of Reporting Period:  -17    Referring Provider: Dr Berumen    Therapy Diagnosis: Cervical pain and radiculopathy     Client Self Report: Pt reports having signif reduced neck pain and improved mobility- yosef after doing HEP.    Objective Measurements:  Objective Measure: cervical disability score  Details: 22% UPON INITIAL EVAL                                          Goals:  Goal Identifier STG 1   Goal Description Pt will demonstrate indep HEP compliance to increase CROM to at least 75% of wnl   Target Date 17   Date Met      Progress:     Goal Identifier STG 2   Goal Description Pt will have indep postural correction for when sitting watching tv through education and HEP.    Target Date 17   Date Met      Progress:     Goal Identifier LTG   Goal Description Pt will return to painfree driving without restriction /difficulty for improved safety   Target Date 17   Date Met      Progress:     Goal Identifier     Goal Description     Target Date     Date Met      Progress:     Goal Identifier     Goal Description     Target Date     Date Met      Progress:     Goal Identifier     Goal Description     Target Date     Date Met      Progress:     Goal Identifier     Goal Description     Target Date     Date Met      Progress:     Goal Identifier     Goal Description     Target Date     Date Met      Progress:     Progress Toward Goals:   Progress this reporting period: **Pt was seen x 9 treatment visits total consisting of US, cervical traction and HEP instruction. Goals partially met as pt did not return to clinic for remeasurements/assessments. Pt was having signif less pain in neck, UE as of last visit and was compliant in HEP.             Plan:  Discharge from therapy.    Discharge:    Reason for Discharge: Patient has failed to schedule further appointments.    Equipment Issued:  n/a    Discharge Plan: Patient to continue home program. Return to MD

## 2017-08-18 ENCOUNTER — OFFICE VISIT (OUTPATIENT)
Dept: FAMILY MEDICINE | Facility: OTHER | Age: 82
End: 2017-08-18
Attending: FAMILY MEDICINE
Payer: OTHER MISCELLANEOUS

## 2017-08-18 VITALS
OXYGEN SATURATION: 93 % | TEMPERATURE: 98.6 F | SYSTOLIC BLOOD PRESSURE: 116 MMHG | BODY MASS INDEX: 30.76 KG/M2 | DIASTOLIC BLOOD PRESSURE: 70 MMHG | WEIGHT: 196 LBS | HEART RATE: 93 BPM | HEIGHT: 67 IN

## 2017-08-18 DIAGNOSIS — M54.50 CHRONIC MIDLINE LOW BACK PAIN WITHOUT SCIATICA: Primary | ICD-10-CM

## 2017-08-18 DIAGNOSIS — G89.29 CHRONIC MIDLINE LOW BACK PAIN WITHOUT SCIATICA: Primary | ICD-10-CM

## 2017-08-18 DIAGNOSIS — M54.40 BILATERAL LOW BACK PAIN WITH SCIATICA, SCIATICA LATERALITY UNSPECIFIED, UNSPECIFIED CHRONICITY: ICD-10-CM

## 2017-08-18 DIAGNOSIS — M62.830 LUMBAR PARASPINAL MUSCLE SPASM: ICD-10-CM

## 2017-08-18 PROCEDURE — 99213 OFFICE O/P EST LOW 20 MIN: CPT | Performed by: FAMILY MEDICINE

## 2017-08-18 RX ORDER — TRAMADOL HYDROCHLORIDE 50 MG/1
50 TABLET ORAL EVERY 8 HOURS PRN
Qty: 90 TABLET | Refills: 5 | Status: SHIPPED | OUTPATIENT
Start: 2017-08-18 | End: 2018-04-24

## 2017-08-18 ASSESSMENT — ANXIETY QUESTIONNAIRES
1. FEELING NERVOUS, ANXIOUS, OR ON EDGE: NOT AT ALL
GAD7 TOTAL SCORE: 0
5. BEING SO RESTLESS THAT IT IS HARD TO SIT STILL: NOT AT ALL
3. WORRYING TOO MUCH ABOUT DIFFERENT THINGS: NOT AT ALL
7. FEELING AFRAID AS IF SOMETHING AWFUL MIGHT HAPPEN: NOT AT ALL
6. BECOMING EASILY ANNOYED OR IRRITABLE: NOT AT ALL
2. NOT BEING ABLE TO STOP OR CONTROL WORRYING: NOT AT ALL

## 2017-08-18 ASSESSMENT — PATIENT HEALTH QUESTIONNAIRE - PHQ9
SUM OF ALL RESPONSES TO PHQ QUESTIONS 1-9: 1
5. POOR APPETITE OR OVEREATING: NOT AT ALL

## 2017-08-18 ASSESSMENT — PAIN SCALES - GENERAL: PAINLEVEL: MILD PAIN (3)

## 2017-08-18 NOTE — PROGRESS NOTES
"Occupational Visit     Subjective:  Keynon Rivera, 83 year old, male is seen Eric Bernal.  The date of injury is 05/31/1975.  The patient is employed at [employer].  Ongoing low back pain.  Change to tramadol successful as he can sleep with it and it helps the pain.  No side effects.  Having a lot of spasms and would like a tens unit.        Allergies   Allergen Reactions     Chlorzoxazone      Parafon Forte         Review of Systems:  Stable ongoing pain with prominent spasms.       OBJECTIVE:  Vitals: /70 (BP Location: Right arm, Patient Position: Supine, Cuff Size: Adult Regular)  Pulse 93  Temp 98.6  F (37  C) (Tympanic)  Ht 5' 6.5\" (1.689 m)  Wt 196 lb (88.9 kg)  SpO2 93%  BMI 31.16 kg/m2    Exam:  Ongoing low back pain and spasm.  walks with slight limp.       Labs:none.       ASSESSMENT/PLAN:    (M54.5,  G89.29) Chronic midline low back pain without sciatica  (primary encounter diagnosis)  Comment: stable.    Plan: tramadol helpful.  No need for ongoing lortab.  Tramadol at 3/daily as needed.  He probably won't need that many.      (W04.374) Lumbar paraspinal muscle spasm  Comment: as above.   Plan: order for DME        With the spasm I recommend a tens unit.  Script printed.     (R25.40) Bilateral low back pain with sciatica, sciatica laterality unspecified, unspecified chronicity  Comment: as above.   Plan: traMADol (ULTRAM) 50 MG tablet        As above.          "

## 2017-08-18 NOTE — MR AVS SNAPSHOT
"              After Visit Summary   8/18/2017    Kenyon Rivera    MRN: [id number]           Patient Information     Date Of Birth          5/31/1934        Visit Information        Provider Department      8/18/2017 1:30 PM Eric Bernal MD [facility]        Today's Diagnoses     Chronic midline low back pain without sciatica    -  1    Lumbar paraspinal muscle spasm        Bilateral low back pain with sciatica, sciatica laterality unspecified, unspecified chronicity          Care Instructions    F/u with ongoing concerns.           Follow-ups after your visit        Your next 10 appointments already scheduled     Aug 18, 2017  1:30 PM CDT   (Arrive by 1:15 PM)   SHORT with Eric Bernal MD   [facility]    [address]  [facility] [place] [address]   [phone]              Who to contact     If you have questions or need follow up information about today's clinic visit or your schedule please contact [facility] directly at [phone].  Normal or non-critical lab and imaging results will be communicated to you by [employer], letter or phone within 4 business days after the clinic has received the results. If you do not hear from us within 7 days, please contact the clinic through [employer] or phone. If you have a critical or abnormal lab result, we will notify you by phone as soon as possible.  Submit refill requests through [employer] or call your pharmacy and they will forward the refill request to us. Please allow 3 business days for your refill to be completed.          Additional Information About Your Visit        [employer] Information     [employer] lets you send messages to your doctor, view your test results, renew your prescriptions, schedule appointments and more. To sign up, go to [email or web address] . Click on \"Log in\" on the left side of the screen, which will take you to the Welcome page. Then click on \"Sign up Now\" on " "the right side of the page.     You will be asked to enter the access code listed below, as well as some personal information. Please follow the directions to create your username and password.     Your access code is: [email or web address]  Expires: 2017  1:46 PM     Your access code will  in 90 days. If you need help or a new code, please call your [place] clinic or [phone].        Care EveryWhere ID     This is your Care EveryWhere ID. This could be used by other organizations to access your [place] medical records  [id number]        Your Vitals Were     Pulse Temperature Height Pulse Oximetry BMI (Body Mass Index)       93 98.6  F (37  C) (Tympanic) 5' 6.5\" (1.689 m) 93% 31.16 kg/m2        Blood Pressure from Last 3 Encounters:   17 116/70   17 152/64   17 133/86    Weight from Last 3 Encounters:   17 196 lb (88.9 kg)   17 195 lb (88.5 kg)   17 198 lb (89.8 kg)              Today, you had the following     No orders found for display         Today's Medication Changes          These changes are accurate as of: 17  1:16 PM.  If you have any questions, ask your nurse or doctor.               These medicines have changed or have updated prescriptions.        Dose/Directions    * order for DME   This may have changed:  Another medication with the same name was added. Make sure you understand how and when to take each.   Used for:  Chronic midline low back pain without sciatica   Changed by:  Eric Bernal MD        Elastic Back brace with stays and velcro enclosure   Quantity:  1 each   Refills:  0       * order for DME   This may have changed:  You were already taking a medication with the same name, and this prescription was added. Make sure you understand how and when to take each.   Used for:  Lumbar paraspinal muscle spasm   Changed by:  Eric Bernal MD        Tens unit.   Quantity:  1 each   Refills:  1       * Notice:  This list has 2 medication(s) " that are the same as other medications prescribed for you. Read the directions carefully, and ask your doctor or other care provider to review them with you.         Where to get your medicines      Some of these will need a paper prescription and others can be bought over the counter.  Ask your nurse if you have questions.     Bring a paper prescription for each of these medications     order for DME    traMADol 50 MG tablet                Primary Care Provider Office Phone # Fax #    Eric Bernal [phone]       [address] [address]        Equal Access to Services     [facility]: Hadii aad ku hadasho Soomaali, waaxda luqadaha, qaybta kaalmada adeegyada, waxay oc hayhawk day . So [facility] [phone].    ATENCIÓN: Si habla español, tiene a estrella disposición servicios gratuitos de asistencia lingüística. [facility] [phone].    We comply with applicable federal civil rights laws and [place] laws. We do not discriminate on the basis of race, color, national origin, age, disability sex, sexual orientation or gender identity.            Thank you!     Thank you for choosing [facility]  for your care. Our goal is always to provide you with excellent care. Hearing back from our patients is one way we can continue to improve our services. Please take a few minutes to complete the written survey that you may receive in the mail after your visit with us. Thank you!             Your Updated Medication List - Protect others around you: Learn how to safely use, store and throw away your medicines at www.disposemymeds.org.          This list is accurate as of: 8/18/17  1:16 PM.  Always use your most recent med list.                   Brand Name Dispense Instructions for use Diagnosis    acetaminophen 500 MG tablet    TYLENOL     Take 500-1,000 mg by mouth every 6 hours as needed        albuterol 108 (90 BASE) MCG/ACT Inhaler     PROAIR HFA/PROVENTIL HFA/VENTOLIN HFA     Inhale 2 puffs into the lungs every 4 hours as needed for shortness of breath / dyspnea or wheezing        aspirin 81 MG tablet      Take 81 mg by mouth daily        calcium carbonate 500 MG chewable tablet    TUMS     Take 1 chew tab by mouth every 4 hours as needed for heartburn        cetirizine 10 MG tablet    zyrTEC     Take 10 mg by mouth daily as needed for allergies        cyclobenzaprine 10 MG tablet    FLEXERIL    60 tablet    TAKE 1 TABLET(10 MG) BY MOUTH TWICE DAILY    Abnormal involuntary movement       Fish Oil 1200 MG Caps      Take by mouth daily Fish oil 1290 omega 3 900        FLOMAX 0.4 MG capsule   Generic drug:  tamsulosin      Take 1 capsule by mouth At Bedtime.        IBUPROFEN PO      Take 200 mg by mouth every 6 hours as needed for moderate pain        losartan 50 MG tablet    COZAAR     Take 1.5 tablets by mouth daily.        omeprazole 20 MG tablet      Take 1 tablet by mouth 2 times daily.        * order for DME     1 each    Elastic Back brace with stays and velcro enclosure    Chronic midline low back pain without sciatica       * order for DME     1 each    Tens unit.    Lumbar paraspinal muscle spasm       senna-docusate 8.6-50 MG per tablet    SENOKOT-S;PERICOLACE     Take 1 tablet by mouth daily        SIMETHICONE-80 PO      Take 80 mg by mouth every 4 hours as needed for intestinal gas        temazepam 30 MG capsule    RESTORIL     Take 1 capsule by mouth nightly as needed        traMADol 50 MG tablet    ULTRAM    90 tablet    Take 1 tablet (50 mg) by mouth every 8 hours as needed for pain    Bilateral low back pain with sciatica, sciatica laterality unspecified, unspecified chronicity       vitamin D3 2000 UNITS Caps      Take by mouth daily        VOLTAREN 1 % Gel topical gel   Generic drug:  diclofenac      Apply 2 g topically every 6 hours as needed #2 tubes written by Dr Berumen and 2 refills.        ZOCOR 20 MG tablet   Generic drug:   simvastatin      Take 1 tablet by mouth daily.        * Notice:  This list has 2 medication(s) that are the same as other medications prescribed for you. Read the directions carefully, and ask your doctor or other care provider to review them with you.

## 2017-08-19 ASSESSMENT — ANXIETY QUESTIONNAIRES: GAD7 TOTAL SCORE: 0

## 2017-08-23 ENCOUNTER — TELEPHONE (OUTPATIENT)
Dept: FAMILY MEDICINE | Facility: OTHER | Age: 82
End: 2017-08-23

## 2017-08-23 NOTE — TELEPHONE ENCOUNTER
We are requesting advance care approval for DME TENS UNIT. Please see office note DME order by Dr. Bernal dated 08/18/17.  To:Cyndee Weinstein   Fax: [phone]   Phone: [phone]

## 2017-09-12 ENCOUNTER — TELEPHONE (OUTPATIENT)
Dept: FAMILY MEDICINE | Facility: OTHER | Age: 82
End: 2017-09-12

## 2017-09-12 DIAGNOSIS — G89.29 CHRONIC BILATERAL LOW BACK PAIN WITH SCIATICA, SCIATICA LATERALITY UNSPECIFIED: Primary | ICD-10-CM

## 2017-09-12 DIAGNOSIS — M54.40 CHRONIC BILATERAL LOW BACK PAIN WITH SCIATICA, SCIATICA LATERALITY UNSPECIFIED: Primary | ICD-10-CM

## 2017-09-12 NOTE — TELEPHONE ENCOUNTER
Patient called stating his Tens unit was approved by work comp and to fax the dr order to [facility] at f [phone] ph [phone].

## 2017-09-12 NOTE — TELEPHONE ENCOUNTER
9:38 AM    Reason for Call: Phone Call    Description: Kenyon states that he needs a prescription for a tens unit for his back sent to [facility]  Phone [phone]   Fax [phone] . [facility] told him that they would be calling    Was an appointment offered for this call? No      Preferred method for responding to this message: [phone]      If we cannot reach you directly, may we leave a detailed response at the number you provided?  Yes      Sarah Smallwood

## 2017-10-17 ENCOUNTER — OFFICE VISIT (OUTPATIENT)
Dept: CHIROPRACTIC MEDICINE | Facility: OTHER | Age: 82
End: 2017-10-17
Attending: CHIROPRACTOR
Payer: OTHER MISCELLANEOUS

## 2017-10-17 DIAGNOSIS — M99.03 SEGMENTAL AND SOMATIC DYSFUNCTION OF LUMBAR REGION: Primary | ICD-10-CM

## 2017-10-17 DIAGNOSIS — M54.50 ACUTE BILATERAL LOW BACK PAIN WITHOUT SCIATICA: ICD-10-CM

## 2017-10-17 DIAGNOSIS — M99.02 SEGMENTAL AND SOMATIC DYSFUNCTION OF THORACIC REGION: ICD-10-CM

## 2017-10-17 PROCEDURE — 98940 CHIROPRACT MANJ 1-2 REGIONS: CPT | Mod: AT | Performed by: CHIROPRACTOR

## 2017-10-17 PROCEDURE — 99212 OFFICE O/P EST SF 10 MIN: CPT | Mod: 25 | Performed by: CHIROPRACTOR

## 2017-10-17 NOTE — MR AVS SNAPSHOT
"              After Visit Summary   10/17/2017    Kenyon Rivera    MRN: [id number]           Patient Information     Date Of Birth          5/31/1934        Visit Information        Provider Department      10/17/2017 4:30 PM Kenyon Syed DC  [facility]        Today's Diagnoses     Segmental and somatic dysfunction of lumbar region    -  1    Acute bilateral low back pain without sciatica        Segmental and somatic dysfunction of thoracic region           Follow-ups after your visit        Your next 10 appointments already scheduled     Nov 08, 2017  1:30 PM CST   (Arrive by 1:15 PM)   SHORT with Eric Bernal MD   [facility] ([facility] )    [address] [facility] [address]   [phone]              Who to contact     If you have questions or need follow up information about today's clinic visit or your schedule please contact  [facility] directly at [phone].  Normal or non-critical lab and imaging results will be communicated to you by MyChart, letter or phone within 4 business days after the clinic has received the results. If you do not hear from us within 7 days, please contact the clinic through MyChart or phone. If you have a critical or abnormal lab result, we will notify you by phone as soon as possible.  Submit refill requests through Sa[employer]o or call your pharmacy and they will forward the refill request to us. Please allow 3 business days for your refill to be completed.          Additional Information About Your Visit        MyChart Information     Saplo lets you send messages to your doctor, view your test results, renew your prescriptions, schedule appointments and more. To sign up, go to [email or web address] . Click on \"Log in\" on the left side of the screen, which will take you to the Welcome page. Then click on \"Sign up Now\" on the right side of the page.     You will be asked to enter the access code listed " below, as well as some personal information. Please follow the directions to create your username and password.     Your access code is: [email or web address]  Expires: 2018 11:32 AM     Your access code will  in 90 days. If you need help or a new code, please call your [facility] or [phone].        Care EveryWhere ID     This is your Care EveryWhere ID. This could be used by other organizations to access your [place] medical records  [id number]         Blood Pressure from Last 3 Encounters:   17 116/70   17 152/64   17 133/86    Weight from Last 3 Encounters:   17 196 lb (88.9 kg)   17 195 lb (88.5 kg)   17 198 lb (89.8 kg)              We Performed the Following     CHIROPRAC MANIP,SPINAL,1-2 REGIONS        Primary Care Provider Office Phone # Fax #    Eric Bernal [phone]       [address] [address]        Equal Access to Services     [facility] : Hadii aad ku hadasho Soomaali, waaxda luqadaha, qaybta kaalmada adeegyada, burton renae hayhawk day . So [facility] [phone].    ATENCIÓN: Si habla español, tiene a estrella disposición servicios gratuitos de asistencia lingüística. [facility] [phone].    We comply with applicable federal civil rights laws and [place] laws. We do not discriminate on the basis of race, color, national origin, age, disability, sex, sexual orientation, or gender identity.            Thank you!     Thank you for choosing  [facility]  for your care. Our goal is always to provide you with excellent care. Hearing back from our patients is one way we can continue to improve our services. Please take a few minutes to complete the written survey that you may receive in the mail after your visit with us. Thank you!             Your Updated Medication List - Protect others around you: Learn how to safely use, store and throw away your medicines at  www.disposemymeds.org.          This list is accurate as of: 10/17/17 11:59 PM.  Always use your most recent med list.                   Brand Name Dispense Instructions for use Diagnosis    acetaminophen 500 MG tablet    TYLENOL     Take 500-1,000 mg by mouth every 6 hours as needed        albuterol 108 (90 BASE) MCG/ACT Inhaler    PROAIR HFA/PROVENTIL HFA/VENTOLIN HFA     Inhale 2 puffs into the lungs every 4 hours as needed for shortness of breath / dyspnea or wheezing        aspirin 81 MG tablet      Take 81 mg by mouth daily        calcium carbonate 500 MG chewable tablet    TUMS     Take 1 chew tab by mouth every 4 hours as needed for heartburn        cetirizine 10 MG tablet    zyrTEC     Take 10 mg by mouth daily as needed for allergies        cyclobenzaprine 10 MG tablet    FLEXERIL    60 tablet    TAKE 1 TABLET(10 MG) BY MOUTH TWICE DAILY    Abnormal involuntary movement       fish Oil 1200 MG capsule      Take by mouth daily Fish oil 1290 omega 3 900        FLOMAX 0.4 MG capsule   Generic drug:  tamsulosin      Take 1 capsule by mouth At Bedtime.        IBUPROFEN PO      Take 200 mg by mouth every 6 hours as needed for moderate pain        losartan 50 MG tablet    COZAAR     Take 1.5 tablets by mouth daily.        omeprazole 20 MG tablet      Take 1 tablet by mouth 2 times daily.        * order for DME     1 each    Elastic Back brace with stays and velcro enclosure    Chronic midline low back pain without sciatica       * order for DME     1 each    Tens unit.    Lumbar paraspinal muscle spasm       * order for DME     1 each    Tens unit and supplies    Chronic bilateral low back pain with sciatica, sciatica laterality unspecified       senna-docusate 8.6-50 MG per tablet    SENOKOT-S;PERICOLACE     Take 1 tablet by mouth daily        SIMETHICONE-80 PO      Take 80 mg by mouth every 4 hours as needed for intestinal gas        temazepam 30 MG capsule    RESTORIL     Take 1 capsule by mouth nightly as  needed        traMADol 50 MG tablet    ULTRAM    90 tablet    Take 1 tablet (50 mg) by mouth every 8 hours as needed for pain    Bilateral low back pain with sciatica, sciatica laterality unspecified, unspecified chronicity       vitamin D3 2000 UNITS Caps      Take by mouth daily        VOLTAREN 1 % Gel topical gel   Generic drug:  diclofenac      Apply 2 g topically every 6 hours as needed #2 tubes written by Dr Berumen and 2 refills.        ZOCOR 20 MG tablet   Generic drug:  simvastatin      Take 1 tablet by mouth daily.        * Notice:  This list has 3 medication(s) that are the same as other medications prescribed for you. Read the directions carefully, and ask your doctor or other care provider to review them with you.

## 2017-11-08 ENCOUNTER — OFFICE VISIT (OUTPATIENT)
Dept: FAMILY MEDICINE | Facility: OTHER | Age: 82
End: 2017-11-08
Attending: FAMILY MEDICINE
Payer: OTHER MISCELLANEOUS

## 2017-11-08 VITALS
HEIGHT: 67 IN | RESPIRATION RATE: 16 BRPM | WEIGHT: 199 LBS | SYSTOLIC BLOOD PRESSURE: 132 MMHG | BODY MASS INDEX: 31.23 KG/M2 | HEART RATE: 100 BPM | DIASTOLIC BLOOD PRESSURE: 58 MMHG | TEMPERATURE: 97.9 F | OXYGEN SATURATION: 95 %

## 2017-11-08 DIAGNOSIS — G89.29 CHRONIC BILATERAL LOW BACK PAIN WITHOUT SCIATICA: Primary | ICD-10-CM

## 2017-11-08 DIAGNOSIS — M54.50 CHRONIC BILATERAL LOW BACK PAIN WITHOUT SCIATICA: Primary | ICD-10-CM

## 2017-11-08 PROCEDURE — 99213 OFFICE O/P EST LOW 20 MIN: CPT | Performed by: FAMILY MEDICINE

## 2017-11-08 ASSESSMENT — PAIN SCALES - GENERAL: PAINLEVEL: MILD PAIN (3)

## 2017-11-08 NOTE — MR AVS SNAPSHOT
"              After Visit Summary   11/8/2017    Kenyon Rivera    MRN: [id number]           Patient Information     Date Of Birth          5/31/1934        Visit Information        Provider Department      11/8/2017 1:30 PM Eric Bernal MD [facility]        Today's Diagnoses     Chronic bilateral low back pain without sciatica    -  1      Care Instructions    F/u with ongoing concerns.           Follow-ups after your visit        Additional Services     PHYSICAL THERAPY REFERRAL       *This therapy referral will be filtered to a centralized scheduling office at [facility] and the patient will receive a call to schedule an appointment at a [place] location most convenient for them. *     [facility] provides Physical Therapy evaluation and treatment and many specialty services across the [place] system.  If requesting a specialty program, please choose from the list below.    If you have not heard from the scheduling office within 2 business days, please call [phone] for all locations, with the exception of [place], please call [phone].  Treatment: Evaluation & Treatment  Special Instructions/Modalities: none  Special Programs: none    Please be aware that coverage of these services is subject to the terms and limitations of your health insurance plan.  Call member services at your health plan with any benefit or coverage questions.      **Note to Provider:  If you are referring outside of [place] for the therapy appointment, please list the name of the location in the \"special instructions\" above, print the referral and give to the patient to schedule the appointment.                  Who to contact     If you have questions or need follow up information about today's clinic visit or your schedule please contact [facility] directly at [phone].  Normal or non-critical lab and imaging results will be communicated to you " "by TeachersMeet.comhart, letter or phone within 4 business days after the clinic has received the results. If you do not hear from us within 7 days, please contact the clinic through [employer] or phone. If you have a critical or abnormal lab result, we will notify you by phone as soon as possible.  Submit refill requests through [employer] or call your pharmacy and they will forward the refill request to us. Please allow 3 business days for your refill to be completed.          Additional Information About Your Visit        TeachersMeet.[employer] Information     [employer] lets you send messages to your doctor, view your test results, renew your prescriptions, schedule appointments and more. To sign up, go to [email or web address] . Click on \"Log in\" on the left side of the screen, which will take you to the Welcome page. Then click on \"Sign up Now\" on the right side of the page.     You will be asked to enter the access code listed below, as well as some personal information. Please follow the directions to create your username and password.     Your access code is: [email or web address]  Expires: 2018 10:32 AM     Your access code will  in 90 days. If you need help or a new code, please call your [place] clinic or [phone].        Care EveryWhere ID     This is your Care EveryWhere ID. This could be used by other organizations to access your [place] medical records  [id number]        Your Vitals Were     Pulse Temperature Respirations Height Pulse Oximetry BMI (Body Mass Index)    100 97.9  F (36.6  C) (Tympanic) 16 5' 6.5\" (1.689 m) 95% 31.64 kg/m2       Blood Pressure from Last 3 Encounters:   17 132/58   17 116/70   17 152/64    Weight from Last 3 Encounters:   17 199 lb (90.3 kg)   17 196 lb (88.9 kg)   17 195 lb (88.5 kg)              We Performed the Following     PHYSICAL THERAPY REFERRAL        Primary Care Provider Office Phone # Fax #    Eric Bernal [phone]       [place] " [place] [facility] [address]  [facility] [address]        Equal Access to Services     RENATALEAH LACY : Hadii aad ku hadjaeo Soomaali, waaxda luqadaha, qaybta kaalmada adedoeyada, burton renae adalicary walshdoe ye shay . So [facility] [phone].    ATENCIÓN: Si habla español, tiene a estrella disposición servicios gratuitos de asistencia lingüística. Llame al [phone].    We comply with applicable federal civil rights laws and [place] laws. We do not discriminate on the basis of race, color, national origin, age, disability, sex, sexual orientation, or gender identity.            Thank you!     Thank you for choosing [facility]  for your care. Our goal is always to provide you with excellent care. Hearing back from our patients is one way we can continue to improve our services. Please take a few minutes to complete the written survey that you may receive in the mail after your visit with us. Thank you!             Your Updated Medication List - Protect others around you: Learn how to safely use, store and throw away your medicines at www.disposemymeds.org.          This list is accurate as of: 11/8/17  1:45 PM.  Always use your most recent med list.                   Brand Name Dispense Instructions for use Diagnosis    acetaminophen 500 MG tablet    TYLENOL     Take 500-1,000 mg by mouth every 6 hours as needed        albuterol 108 (90 BASE) MCG/ACT Inhaler    PROAIR HFA/PROVENTIL HFA/VENTOLIN HFA     Inhale 2 puffs into the lungs every 4 hours as needed for shortness of breath / dyspnea or wheezing        aspirin 81 MG tablet      Take 81 mg by mouth daily        calcium carbonate 500 MG chewable tablet    TUMS     Take 1 chew tab by mouth every 4 hours as needed for heartburn        cetirizine 10 MG tablet    zyrTEC     Take 10 mg by mouth daily as needed for allergies        cyclobenzaprine 10 MG tablet    FLEXERIL    60 tablet    TAKE 1 TABLET(10 MG) BY MOUTH TWICE DAILY    Abnormal involuntary  movement       fish Oil 1200 MG capsule      Take by mouth daily Fish oil 1290 omega 3 900        FLOMAX 0.4 MG capsule   Generic drug:  tamsulosin      Take 1 capsule by mouth At Bedtime.        IBUPROFEN PO      Take 200 mg by mouth every 6 hours as needed for moderate pain        losartan 50 MG tablet    COZAAR     Take 1.5 tablets by mouth daily.        omeprazole 20 MG tablet      Take 1 tablet by mouth 2 times daily.        * order for DME     1 each    Elastic Back brace with stays and velcro enclosure    Chronic midline low back pain without sciatica       * order for DME     1 each    Tens unit.    Lumbar paraspinal muscle spasm       * order for DME     1 each    Tens unit and supplies    Chronic bilateral low back pain with sciatica, sciatica laterality unspecified       senna-docusate 8.6-50 MG per tablet    SENOKOT-S;PERICOLACE     Take 1 tablet by mouth daily        SIMETHICONE-80 PO      Take 80 mg by mouth every 4 hours as needed for intestinal gas        temazepam 30 MG capsule    RESTORIL     Take 1 capsule by mouth nightly as needed        traMADol 50 MG tablet    ULTRAM    90 tablet    Take 1 tablet (50 mg) by mouth every 8 hours as needed for pain    Bilateral low back pain with sciatica, sciatica laterality unspecified, unspecified chronicity       vitamin D3 2000 UNITS Caps      Take by mouth daily        VOLTAREN 1 % Gel topical gel   Generic drug:  diclofenac      Apply 2 g topically every 6 hours as needed #2 tubes written by Dr Berumen and 2 refills.        ZOCOR 20 MG tablet   Generic drug:  simvastatin      Take 1 tablet by mouth daily.        * Notice:  This list has 3 medication(s) that are the same as other medications prescribed for you. Read the directions carefully, and ask your doctor or other care provider to review them with you.

## 2017-11-08 NOTE — NURSING NOTE
"Chief Complaint   Patient presents with     Work Comp       Initial /58 (BP Location: Left arm, Patient Position: Chair, Cuff Size: Adult Regular)  Pulse 100  Temp 97.9  F (36.6  C) (Tympanic)  Resp 16  Ht 5' 6.5\" (1.689 m)  Wt 199 lb (90.3 kg)  SpO2 95%  BMI 31.64 kg/m2 Estimated body mass index is 31.64 kg/(m^2) as calculated from the following:    Height as of this encounter: 5' 6.5\" (1.689 m).    Weight as of this encounter: 199 lb (90.3 kg).  Medication Reconciliation: complete   Orly Domingo    "

## 2017-11-08 NOTE — PROGRESS NOTES
"Occupational Visit     Subjective:  Kenyon Rivera, 83 year old, male is seen for a FU on his work comp injury.  The date of injury is 05/31/75.  The patient was employed at [employer].  He is working hard with HEP.  He uses the meds reliably.  He would like to get some traction again and also get educated more on the TENS unit he now has.        Allergies   Allergen Reactions     Chlorzoxazone      Parafon Forte         Review of Systems:  Stable.       OBJECTIVE:  Vitals: /58 (BP Location: Left arm, Patient Position: Chair, Cuff Size: Adult Regular)  Pulse 100  Temp 97.9  F (36.6  C) (Tympanic)  Resp 16  Ht 5' 6.5\" (1.689 m)  Wt 199 lb (90.3 kg)  SpO2 95%  BMI 31.64 kg/m2        Exam:  Walks with a slight limp.       Labs:      ASSESSMENT/PLAN:    (M54.5,  G89.29) Chronic bilateral low back pain without sciatica  (primary encounter diagnosis)  Comment: doing ok   Plan: add the traction with PT and the Tens which he has.  F/u routine.  Doing well.         "

## 2017-12-01 DIAGNOSIS — R25.9 ABNORMAL INVOLUNTARY MOVEMENT: ICD-10-CM

## 2017-12-01 NOTE — TELEPHONE ENCOUNTER
Flexeril       Last Written Prescription Date: 5/10/17  Last Fill Quantity: 60,  # refills: 0   Last Office Visit with [facility], [facility] or [facility] prescribing provider: 11/8/17                                         Next 5 appointments (look out 90 days)     Dec 12, 2017  1:15 PM CST   (Arrive by 1:00 PM)   SHORT with Eric Bernal MD   [facility] ([facility] )    [address] [facility] [address]   [phone]

## 2017-12-05 ENCOUNTER — TRANSFERRED RECORDS (OUTPATIENT)
Dept: HEALTH INFORMATION MANAGEMENT | Facility: HOSPITAL | Age: 82
End: 2017-12-05

## 2017-12-05 ENCOUNTER — OFFICE VISIT (OUTPATIENT)
Dept: CHIROPRACTIC MEDICINE | Facility: OTHER | Age: 82
End: 2017-12-05
Attending: CHIROPRACTOR
Payer: OTHER MISCELLANEOUS

## 2017-12-05 DIAGNOSIS — M99.02 SEGMENTAL AND SOMATIC DYSFUNCTION OF THORACIC REGION: ICD-10-CM

## 2017-12-05 DIAGNOSIS — M54.50 ACUTE BILATERAL LOW BACK PAIN WITHOUT SCIATICA: ICD-10-CM

## 2017-12-05 DIAGNOSIS — M99.03 SEGMENTAL AND SOMATIC DYSFUNCTION OF LUMBAR REGION: Primary | ICD-10-CM

## 2017-12-05 PROCEDURE — 99212 OFFICE O/P EST SF 10 MIN: CPT | Mod: 25 | Performed by: CHIROPRACTOR

## 2017-12-05 PROCEDURE — 98940 CHIROPRACT MANJ 1-2 REGIONS: CPT | Mod: AT | Performed by: CHIROPRACTOR

## 2017-12-05 RX ORDER — CYCLOBENZAPRINE HCL 10 MG
TABLET ORAL
Qty: 60 TABLET | Refills: 0 | Status: SHIPPED | OUTPATIENT
Start: 2017-12-05 | End: 2018-01-02

## 2017-12-05 NOTE — MR AVS SNAPSHOT
"              After Visit Summary   12/5/2017    Kenyon Rivera    MRN: [id number]           Patient Information     Date Of Birth          5/31/1934        Visit Information        Provider Department      12/5/2017 12:40 PM Kenyon Syed DC  [facility]        Today's Diagnoses     Segmental and somatic dysfunction of lumbar region    -  1    Acute bilateral low back pain without sciatica        Segmental and somatic dysfunction of thoracic region           Follow-ups after your visit        Your next 10 appointments already scheduled     Dec 11, 2017  1:00 PM CST   LBP HIGH INITIAL with Danyelle Smallwood, PT   [facility] ([facility] )    [address] [address]   [phone]            Dec 12, 2017  1:15 PM CST   (Arrive by 1:00 PM)   SHORT with Eric Bernal MD   [facility] ([facility] )    [address]  [facility] [address]   [phone]              Who to contact     If you have questions or need follow up information about today's clinic visit or your schedule please contact  [facility] directly at [phone].  Normal or non-critical lab and imaging results will be communicated to you by B2M Solutionshart, letter or phone within 4 business days after the clinic has received the results. If you do not hear from us within 7 days, please contact the clinic through [employer] or phone. If you have a critical or abnormal lab result, we will notify you by phone as soon as possible.  Submit refill requests through [employer] or call your pharmacy and they will forward the refill request to us. Please allow 3 business days for your refill to be completed.          Additional Information About Your Visit        [employer] Information     [employer] lets you send messages to your doctor, view your test results, renew your prescriptions, schedule appointments and more. To sign up, go to [email or web address] . Click on \"Log in\" on " "the left side of the screen, which will take you to the Welcome page. Then click on \"Sign up Now\" on the right side of the page.     You will be asked to enter the access code listed below, as well as some personal information. Please follow the directions to create your username and password.     Your access code is: [email or web address]  Expires: 2018 10:32 AM     Your access code will  in 90 days. If you need help or a new code, please call your [facility] or [phone].        Care EveryWhere ID     This is your Care EveryWhere ID. This could be used by other organizations to access your [place] medical records  [id number]         Blood Pressure from Last 3 Encounters:   17 132/58   17 116/70   17 152/64    Weight from Last 3 Encounters:   17 199 lb (90.3 kg)   17 196 lb (88.9 kg)   17 195 lb (88.5 kg)              We Performed the Following     CHIROPRAC MANIP,SPINAL,1-2 REGIONS        Primary Care Provider Office Phone # Fax #    Eric Bernal [phone]       [address] [address]        Equal Access to Services     [employer] [employer] : Hadii aad ku hadasho Soomaali, waaxda luqadaha, qaybta kaalmada adeegyada, waxay idiin hayaan hernán day . So [facility] [phone].    ATENCIÓN: Si habla español, tiene a estrella disposición servicios gratuitos de asistencia lingüística. Jlame al [phone].    We comply with applicable federal civil rights laws and [place] laws. We do not discriminate on the basis of race, color, national origin, age, disability, sex, sexual orientation, or gender identity.            Thank you!     Thank you for choosing  [facility]  for your care. Our goal is always to provide you with excellent care. Hearing back from our patients is one way we can continue to improve our services. Please take a few minutes to complete the written survey that you may receive in the mail " after your visit with us. Thank you!             Your Updated Medication List - Protect others around you: Learn how to safely use, store and throw away your medicines at www.disposemymeds.org.          This list is accurate as of: 12/5/17 11:59 PM.  Always use your most recent med list.                   Brand Name Dispense Instructions for use Diagnosis    acetaminophen 500 MG tablet    TYLENOL     Take 500-1,000 mg by mouth every 6 hours as needed        albuterol 108 (90 BASE) MCG/ACT Inhaler    PROAIR HFA/PROVENTIL HFA/VENTOLIN HFA     Inhale 2 puffs into the lungs every 4 hours as needed for shortness of breath / dyspnea or wheezing        aspirin 81 MG tablet      Take 81 mg by mouth daily        calcium carbonate 500 MG chewable tablet    TUMS     Take 1 chew tab by mouth every 4 hours as needed for heartburn        cetirizine 10 MG tablet    zyrTEC     Take 10 mg by mouth daily as needed for allergies        cyclobenzaprine 10 MG tablet    FLEXERIL    60 tablet    TAKE 1 TABLET(10 MG) BY MOUTH TWICE DAILY    Abnormal involuntary movement       fish Oil 1200 MG capsule      Take by mouth daily Fish oil 1290 omega 3 900        FLOMAX 0.4 MG capsule   Generic drug:  tamsulosin      Take 1 capsule by mouth At Bedtime.        IBUPROFEN PO      Take 200 mg by mouth every 6 hours as needed for moderate pain        losartan 50 MG tablet    COZAAR     Take 1.5 tablets by mouth daily.        omeprazole 20 MG tablet      Take 1 tablet by mouth 2 times daily.        * order for DME     1 each    Elastic Back brace with stays and velcro enclosure    Chronic midline low back pain without sciatica       * order for DME     1 each    Tens unit.    Lumbar paraspinal muscle spasm       * order for DME     1 each    Tens unit and supplies    Chronic bilateral low back pain with sciatica, sciatica laterality unspecified       senna-docusate 8.6-50 MG per tablet    SENOKOT-S;PERICOLACE     Take 1 tablet by mouth daily         SIMETHICONE-80 PO      Take 80 mg by mouth every 4 hours as needed for intestinal gas        temazepam 30 MG capsule    RESTORIL     Take 1 capsule by mouth nightly as needed        traMADol 50 MG tablet    ULTRAM    90 tablet    Take 1 tablet (50 mg) by mouth every 8 hours as needed for pain    Bilateral low back pain with sciatica, sciatica laterality unspecified, unspecified chronicity       vitamin D3 2000 UNITS Caps      Take by mouth daily        VOLTAREN 1 % Gel topical gel   Generic drug:  diclofenac      Apply 2 g topically every 6 hours as needed #2 tubes written by Dr Berumen and 2 refills.        ZOCOR 20 MG tablet   Generic drug:  simvastatin      Take 1 tablet by mouth daily.        * Notice:  This list has 3 medication(s) that are the same as other medications prescribed for you. Read the directions carefully, and ask your doctor or other care provider to review them with you.

## 2017-12-06 NOTE — PROGRESS NOTES
Subjective Finding:    Chief compalint: Patient presents with:  Back Pain  , Pain Scale: 5/10, Intensity: dull and ache, Duration: 2 weeks, Change since last visit: , Radiating: bilateral buttock.    Date of injury:     Activities that the pain restricts:   Home/household/hobbies/social activities: yes.  Work duties: yes.  Sleep: no.  Makes symptoms better: rest.  Makes symptoms worse: activity, lumbar extension and lumbar flexion.  Have you seen anyone else for the symptoms? no.  Work related: no  Automobile related injury: no.    Objective and Assessment:    Posture Analysis:   High shoulder: right.  Head tilt: right.  High iliac crest: right.  Head carriage: neutral.  Thoracic Kyphosis: neutral.  Lumbar Lordosis: forward.    Lumbar Range of Motion: flexion decreased, extension decreased, left lateral flexion decreased and right lateral flexion decreased.  Cervical Range of Motion: .  Thoracic Range of Motion: .  Extremity Range of Motion: .    Palpation:   Quad lumb: bilateral, referred pain: no    Segmental dysfunction pre-treatment: T10  L4-5  SI.     Assessment post-treatment:  Cervical: ROM increased.  Thoracic: ROM increased.  Lumbar: ROM increased, pain and tenderness decreased and muscle spasm decreased.    Comments: .      Complicating Factors: .    Plan / Procedure:    Expected release date: .  Treatment plan: 1 times per month.  Instructed patient: ice 20 minutes every other hour as needed and rest.  Short term goals: reduce pain.  Long term goals: restore normal function.  Prognosis: excellent.                              Heather Waddell

## 2017-12-11 ENCOUNTER — HOSPITAL ENCOUNTER (OUTPATIENT)
Dept: PHYSICAL THERAPY | Facility: HOSPITAL | Age: 82
Setting detail: THERAPIES SERIES
End: 2017-12-11
Attending: FAMILY MEDICINE
Payer: OTHER MISCELLANEOUS

## 2017-12-11 PROCEDURE — 40000718 ZZHC STATISTIC PT DEPARTMENT ORTHO VISIT: Performed by: PHYSICAL THERAPIST

## 2017-12-11 PROCEDURE — 97161 PT EVAL LOW COMPLEX 20 MIN: CPT | Mod: GP | Performed by: PHYSICAL THERAPIST

## 2017-12-11 PROCEDURE — G8979 MOBILITY GOAL STATUS: HCPCS | Mod: GP,CI | Performed by: PHYSICAL THERAPIST

## 2017-12-11 PROCEDURE — G8978 MOBILITY CURRENT STATUS: HCPCS | Mod: GP,CK | Performed by: PHYSICAL THERAPIST

## 2017-12-11 PROCEDURE — 97012 MECHANICAL TRACTION THERAPY: CPT | Mod: GP | Performed by: PHYSICAL THERAPIST

## 2017-12-11 PROCEDURE — 97014 ELECTRIC STIMULATION THERAPY: CPT | Mod: GP | Performed by: PHYSICAL THERAPIST

## 2017-12-12 ENCOUNTER — OFFICE VISIT (OUTPATIENT)
Dept: FAMILY MEDICINE | Facility: OTHER | Age: 82
End: 2017-12-12
Attending: FAMILY MEDICINE
Payer: COMMERCIAL

## 2017-12-12 VITALS
OXYGEN SATURATION: 93 % | HEIGHT: 67 IN | HEART RATE: 91 BPM | SYSTOLIC BLOOD PRESSURE: 128 MMHG | DIASTOLIC BLOOD PRESSURE: 66 MMHG | BODY MASS INDEX: 30.61 KG/M2 | TEMPERATURE: 97.7 F | WEIGHT: 195 LBS

## 2017-12-12 DIAGNOSIS — R13.10 DYSPHAGIA, UNSPECIFIED TYPE: ICD-10-CM

## 2017-12-12 DIAGNOSIS — R09.89 PHLEGM IN THROAT: Primary | ICD-10-CM

## 2017-12-12 PROCEDURE — 99212 OFFICE O/P EST SF 10 MIN: CPT

## 2017-12-12 PROCEDURE — 99213 OFFICE O/P EST LOW 20 MIN: CPT | Performed by: FAMILY MEDICINE

## 2017-12-12 ASSESSMENT — ANXIETY QUESTIONNAIRES
2. NOT BEING ABLE TO STOP OR CONTROL WORRYING: NOT AT ALL
3. WORRYING TOO MUCH ABOUT DIFFERENT THINGS: NOT AT ALL
6. BECOMING EASILY ANNOYED OR IRRITABLE: NOT AT ALL
5. BEING SO RESTLESS THAT IT IS HARD TO SIT STILL: NOT AT ALL
1. FEELING NERVOUS, ANXIOUS, OR ON EDGE: NOT AT ALL
GAD7 TOTAL SCORE: 0
4. TROUBLE RELAXING: NOT AT ALL
7. FEELING AFRAID AS IF SOMETHING AWFUL MIGHT HAPPEN: NOT AT ALL

## 2017-12-12 ASSESSMENT — PATIENT HEALTH QUESTIONNAIRE - PHQ9: SUM OF ALL RESPONSES TO PHQ QUESTIONS 1-9: 0

## 2017-12-12 ASSESSMENT — PAIN SCALES - GENERAL: PAINLEVEL: MILD PAIN (2)

## 2017-12-12 NOTE — MR AVS SNAPSHOT
After Visit Summary   12/12/2017    Kenyon Rivera    MRN: [id number]           Patient Information     Date Of Birth          5/31/1934        Visit Information        Provider Department      12/12/2017 1:15 PM Eric Bernal MD [facility]        Today's Diagnoses     Phlegm in throat    -  1    Dysphagia, unspecified type          Care Instructions    F/u with ongoing concerns.           Follow-ups after your visit        Your next 10 appointments already scheduled     Dec 15, 2017  2:30 PM CST   LBP LOW FOLLOW UP with Ryanne Murray PTA   [facility] ([facility] )    [address] [address]   [phone]            Dec 18, 2017 11:30 AM CST   LBP LOW FOLLOW UP with Danyelle Smallwood, AFTAB   [facility] ([facility] )    [address] [address]   [phone]            Dec 22, 2017 10:00 AM CST   LBP LOW FOLLOW UP with Ryanne Murray PTA   [facility] ([facility] )    [address] [address]   [phone]            Dec 26, 2017  1:00 PM CST   LBP LOW FOLLOW UP with Ryanne Murray PTA   [facility] ([facility] )    [address] [address]   [phone]            Dec 26, 2017  1:30 PM CST   XR VIDEO SPEECH EVALUATION WITH ESOPHAGRAM with HIXR5, JANEL ARIASRRROMELIA   [facility] ([facility] )    [address] [id number] [phone]           Please bring a list of your current medicines to your exam. (Include vitamins, minerals and over-the-counter medicines.) Leave your valuables at home.  Tell the doctor if there is a chance you could be pregnant.  Do not eat for 4 hours before the exam. Keep drinking clear liquids until 2 hours before the exam.  You may take pain medicine (with a sip of water) up to 4 hours before the exam.  Do not swallow any other medicines unless your doctor tells you to. Talk to your doctor  "to be sure it s safe to stop your medicines.  Please call the Imaging Department at your exam site with any questions.            Dec 28, 2017 11:00 AM CST   LBP LOW FOLLOW UP with Ryanne Murray PTA   [facility] ([facility] )    [address] [address]   [phone]            Jan 02, 2018  2:30 PM CST   LBP LOW FOLLOW UP with Danyelle VOLODYMYR Selin, PT   [facility] ([facility] )    [address] [address]   [phone]            Jan 05, 2018  1:30 PM CST   LBP LOW FOLLOW UP with Ryanne BARAK Murray   [facility] ([facility] )    [address] [address]   [phone]              Future tests that were ordered for you today     Open Future Orders        Priority Expected Expires Ordered    XR Video Swallow w Esophagram Routine 12/12/2017 12/12/2018 12/12/2017            Who to contact     If you have questions or need follow up information about today's clinic visit or your schedule please contact [facility] directly at [phone].  Normal or non-critical lab and imaging results will be communicated to you by [employer], letter or phone within 4 business days after the clinic has received the results. If you do not hear from us within 7 days, please contact the clinic through [employer] or phone. If you have a critical or abnormal lab result, we will notify you by phone as soon as possible.  Submit refill requests through [employer] or call your pharmacy and they will forward the refill request to us. Please allow 3 business days for your refill to be completed.          Additional Information About Your Visit        [employer] Information     [employer] lets you send messages to your doctor, view your test results, renew your prescriptions, schedule appointments and more. To sign up, go to [email or web address] . Click on \"Log in\" on the left side of the screen, which will take you to the Welcome page. Then " "click on \"Sign up Now\" on the right side of the page.     You will be asked to enter the access code listed below, as well as some personal information. Please follow the directions to create your username and password.     Your access code is: [email or web address]  Expires: 2018 10:32 AM     Your access code will  in 90 days. If you need help or a new code, please call your [facility] or [phone].        Care EveryWhere ID     This is your Care EveryWhere ID. This could be used by other organizations to access your [place] medical records  [id number]        Your Vitals Were     Pulse Temperature Height Pulse Oximetry BMI (Body Mass Index)       91 97.7  F (36.5  C) 5' 6.5\" (1.689 m) 93% 31 kg/m2        Blood Pressure from Last 3 Encounters:   17 128/66   17 132/58   17 116/70    Weight from Last 3 Encounters:   17 195 lb (88.5 kg)   17 199 lb (90.3 kg)   17 196 lb (88.9 kg)                 Today's Medication Changes          These changes are accurate as of: 17  1:29 PM.  If you have any questions, ask your nurse or doctor.               Start taking these medicines.        Dose/Directions    amoxicillin-clavulanate 875-125 MG per tablet   Commonly known as:  AUGMENTIN   Used for:  Phlegm in throat, Dysphagia, unspecified type   Started by:  Eric Bernal MD        Dose:  1 tablet   Take 1 tablet by mouth 2 times daily   Quantity:  20 tablet   Refills:  0            Where to get your medicines      These medications were sent to [id number] - [place] - [address]  [address] [address]     Phone:  [phone]     amoxicillin-clavulanate 875-125 MG per tablet                Primary Care Provider Office Phone # Fax #    Eric Bernal [phone]       [place] [address]  [facility] [address]        Equal Access to Services     LEAH HOUSE AH: Hadleilani Gaitan " norbert deltaalyssa sunburton muñoz ah. So [facility] [phone].    ATENCIÓN: Si clement monteiro, tiene a estrella disposición servicios gratuitos de asistencia lingüística. Zachary al [phone].    We comply with applicable federal civil rights laws and [place] laws. We do not discriminate on the basis of race, color, national origin, age, disability, sex, sexual orientation, or gender identity.            Thank you!     Thank you for choosing [facility]  for your care. Our goal is always to provide you with excellent care. Hearing back from our patients is one way we can continue to improve our services. Please take a few minutes to complete the written survey that you may receive in the mail after your visit with us. Thank you!             Your Updated Medication List - Protect others around you: Learn how to safely use, store and throw away your medicines at www.disposemymeds.org.          This list is accurate as of: 12/12/17  1:29 PM.  Always use your most recent med list.                   Brand Name Dispense Instructions for use Diagnosis    acetaminophen 500 MG tablet    TYLENOL     Take 500-1,000 mg by mouth every 6 hours as needed        albuterol 108 (90 BASE) MCG/ACT Inhaler    PROAIR HFA/PROVENTIL HFA/VENTOLIN HFA     Inhale 2 puffs into the lungs every 4 hours as needed for shortness of breath / dyspnea or wheezing        amoxicillin-clavulanate 875-125 MG per tablet    AUGMENTIN    20 tablet    Take 1 tablet by mouth 2 times daily    Phlegm in throat, Dysphagia, unspecified type       aspirin 81 MG tablet      Take 81 mg by mouth daily        calcium carbonate 500 MG chewable tablet    TUMS     Take 1 chew tab by mouth every 4 hours as needed for heartburn        cetirizine 10 MG tablet    zyrTEC     Take 10 mg by mouth daily as needed for allergies        cyclobenzaprine 10 MG tablet    FLEXERIL    60 tablet    TAKE 1 TABLET(10 MG) BY MOUTH TWICE DAILY     Abnormal involuntary movement       fish Oil 1200 MG capsule      Take by mouth daily Fish oil 1290 omega 3 900        FLOMAX 0.4 MG capsule   Generic drug:  tamsulosin      Take 1 capsule by mouth At Bedtime.        IBUPROFEN PO      Take 200 mg by mouth every 6 hours as needed for moderate pain        losartan 50 MG tablet    COZAAR     Take 1.5 tablets by mouth daily.        omeprazole 20 MG tablet      Take 1 tablet by mouth 2 times daily.        * order for DME     1 each    Elastic Back brace with stays and velcro enclosure    Chronic midline low back pain without sciatica       * order for DME     1 each    Tens unit.    Lumbar paraspinal muscle spasm       * order for DME     1 each    Tens unit and supplies    Chronic bilateral low back pain with sciatica, sciatica laterality unspecified       senna-docusate 8.6-50 MG per tablet    SENOKOT-S;PERICOLACE     Take 1 tablet by mouth daily        SIMETHICONE-80 PO      Take 80 mg by mouth every 4 hours as needed for intestinal gas        temazepam 30 MG capsule    RESTORIL     Take 1 capsule by mouth nightly as needed        traMADol 50 MG tablet    ULTRAM    90 tablet    Take 1 tablet (50 mg) by mouth every 8 hours as needed for pain    Bilateral low back pain with sciatica, sciatica laterality unspecified, unspecified chronicity       vitamin D3 2000 UNITS Caps      Take by mouth daily        VOLTAREN 1 % Gel topical gel   Generic drug:  diclofenac      Apply 2 g topically every 6 hours as needed #2 tubes written by Dr Berumen and 2 refills.        ZOCOR 20 MG tablet   Generic drug:  simvastatin      Take 1 tablet by mouth daily.        * Notice:  This list has 3 medication(s) that are the same as other medications prescribed for you. Read the directions carefully, and ask your doctor or other care provider to review them with you.

## 2017-12-12 NOTE — PROGRESS NOTES
Kenyon Rivera    December 12, 2017    Chief Complaint   Patient presents with     Throat Problem     Pt states he has alot of phlegm, trouble swallowing, trouble breathing when laying on back       SUBJECTIVE:  Here for a phlegm issue.  When he lies on his back the phlegm runs down his throat.  No fever.  Has had swallow difficulties for years and it's happening more now with this phlegm.  No facial pain.  Phlegm is constant.  Sometimes has some yellow.      Past Medical History:   Diagnosis Date     Calculus of gallbladder with other cholecystitis, without mention of obstruction 11/18/2004     Displacement of lumbar intervertebral disc without myelopathy 11/17/2000     Hyperplasia of prostate 01/04/2000     Hypertension, Benign 07/11/2011     Insomnia, unspecified 03/17/2011     Lumbago 11/02/1999     Nonallopathic lesion of cervical region, not elsewhere classified 12/19/2001     Nonallopathic lesion of lumbar region, not elsewhere classified 03/05/2003     Nonallopathic lesion of thoracic region, not elsewhere classified 11/17/2000     Other and unspecified hyperlipidemia 12/14/1999     Other diseases of respiratory system, not elsewhere classified 04/21/2004     Other malaise and fatigue 04/05/2006     Pneumonia, organism unspecified(486) 12/13/1999       Past Surgical History:   Procedure Laterality Date     APPENDECTOMY       cataract extraction and lens implantation  3/2007    LEFT     CHOLECYSTECTOMY       electric stimulator implant       excision of Dupuytren's contracture extending into ring finger  02/08/2008    Bilateral     inguinal herniography      LEFT     NEPHRECTOMY  1992    RIGHT > Renal Cancer     OPEN REDUCTION INTERNAL FIXATION ANKLE      RIGHT     penile implant       TURP         Current Outpatient Prescriptions   Medication Sig Dispense Refill     amoxicillin-clavulanate (AUGMENTIN) 875-125 MG per tablet Take 1 tablet by mouth 2 times daily 20 tablet 0     cyclobenzaprine (FLEXERIL) 10  MG tablet TAKE 1 TABLET(10 MG) BY MOUTH TWICE DAILY 60 tablet 0     order for DME Tens unit and supplies 1 each 11     order for DME Tens unit. 1 each 1     traMADol (ULTRAM) 50 MG tablet Take 1 tablet (50 mg) by mouth every 8 hours as needed for pain 90 tablet 5     order for DME Elastic Back brace with stays and velcro enclosure 1 each 0     senna-docusate (SENOKOT-S;PERICOLACE) 8.6-50 MG per tablet Take 1 tablet by mouth daily       calcium carbonate (TUMS) 500 MG chewable tablet Take 1 chew tab by mouth every 4 hours as needed for heartburn       SIMETHICONE-80 PO Take 80 mg by mouth every 4 hours as needed for intestinal gas       albuterol (PROAIR HFA, PROVENTIL HFA, VENTOLIN HFA) 108 (90 BASE) MCG/ACT inhaler Inhale 2 puffs into the lungs every 4 hours as needed for shortness of breath / dyspnea or wheezing       cetirizine (ZYRTEC) 10 MG tablet Take 10 mg by mouth daily as needed for allergies       IBUPROFEN PO Take 200 mg by mouth every 6 hours as needed for moderate pain       diclofenac (VOLTAREN) 1 % GEL Apply 2 g topically every 6 hours as needed #2 tubes written by Dr Berumen and 2 refills.       temazepam (RESTORIL) 30 MG capsule Take 1 capsule by mouth nightly as needed       acetaminophen (TYLENOL) 500 MG tablet Take 500-1,000 mg by mouth every 6 hours as needed       Cholecalciferol (VITAMIN D3) 2000 UNITS CAPS Take by mouth daily       Omega-3 Fatty Acids (FISH OIL) 1200 MG CAPS Take by mouth daily Fish oil 1290 omega 3 900       aspirin 81 MG tablet Take 81 mg by mouth daily        losartan (COZAAR) 50 MG tablet Take 1.5 tablets by mouth daily.       omeprazole 20 MG tablet Take 1 tablet by mouth 2 times daily.       simvastatin (ZOCOR) 20 MG tablet Take 1 tablet by mouth daily.       tamsulosin (FLOMAX) 0.4 MG 24 hr capsule Take 1 capsule by mouth At Bedtime.         Allergies   Allergen Reactions     Chlorzoxazone      Mikhail Meyer       Family History   Problem Relation Age of Onset      DIABETES Brother      C.A.D. Mother      Myocardial Infarction Mother      HEART DISEASE Mother      Heart Disease     CANCER Sister      Leukemia     Other - See Comments Other      Colitis       Social History     Social History     Marital status:      Spouse name: N/A     Number of children: N/A     Years of education: N/A     Occupational History     LOGGING Retired     Social History Main Topics     Smoking status: Former Smoker     Types: Cigarettes     Quit date: 1/30/1966     Smokeless tobacco: Never Used      Comment: Tried to Quit (YES)     Alcohol use Yes      Comment: RARELY     Drug use: No     Sexual activity: No     Other Topics Concern      Service Yes     [employer]     Blood Transfusions Yes     Permits if needed     Caffeine Concern Yes     COFFEE - 1 CUP DAILY     Occupational Exposure No     Hobby Hazards No     Sleep Concern Yes     temazepam     Stress Concern No     Weight Concern No     Special Diet No     Back Care No     Exercise Yes     GYM > 3-4 times/week (0-5 Hours)     Seat Belt Yes     Parent/Sibling W/ Cabg, Mi Or Angioplasty Before 65f 55m? Yes     mother     Social History Narrative       5 point ROS negative except as noted above in HPI, including Gen., Resp., CV, GI &  system review.     OBJECTIVE:  B/P: 128/66, Temperature: 97.7, Pulse: 91, Respirations: Data Unavailable    GENERAL APPEARANCE: Alert, no acute distress  HEENT:  Postnasal drip noted with white phlegm.  OTW normal.     CV: regular rate and rhythm, no murmur, rub or gallop  RESP: lungs clear to auscultation bilaterally  ABDOMEN: normal bowel sounds, soft, nontender, no hepatosplenomegaly or other masses  SKIN: no suspicious lesions or rashes to visualized skin  NEURO: Alert, oriented x 3, speech and mentation normal    ASSESSMENT and PLAN:  (R09.89) Phlegm in throat  (primary encounter diagnosis)  Comment: worsening and ongoing  Plan: XR Video Swallow w Esophagram,         amoxicillin-clavulanate  (AUGMENTIN) 875-125 MG         per tablet        Seems like it could be coming from the sinuses.  Getting a trial of augmentin after discussing risks/benefits.  Update xray swallow after abx completed.  He is pleased and will report changes.     (R13.10) Dysphagia, unspecified type  Comment: as above.   Plan: XR Video Swallow w Esophagram,         amoxicillin-clavulanate (AUGMENTIN) 875-125 MG         per tablet        As above.

## 2017-12-12 NOTE — PROGRESS NOTES
12/11/17 1248   General Information   Type of Visit Initial OP Ortho PT Evaluation   Start of Care Date 12/11/17   Referring Physician Dr. Eric Bernal   Patient/Family Goals Statement to decrease pain back to baseline level through traction and e-stim   Orders Evaluate and Treat   Date of Order 11/08/17   Insurance Type Other;Medicare   Insurance Comments/Visits Authorized Work Comp, 12 approved   Medical Diagnosis Chronic bilateral low back pain without sciatica   Surgical/Medical history reviewed Yes   Body Part(s)   Body Part(s) Lumbar Spine/SI   Presentation and Etiology   Pertinent history of current problem (include personal factors and/or comorbidities that impact the POC) Pt was injured in 1975 while working for a [employer] company when a tree fell on him and injured his back.  Pt is typically quite active, goes to the gym 3x/week for a good strengthening program.  Pt reports that pain has again progressed to now bothering bilateral legs.  Pt reports pain down into R LE down to foot, L LE pain radiates down to the knee.  Pt states that the weather tends to aggravate him more than anything.  Pt states he tolerates sitting well.  Pt states he is able to sleep without any real difficulty, but does take a sleeping pill prior to bed.  Pt reports he got approval for a TENS unit and obtained a unit about 1.5 months ago.  Last three weeks back pain has flared up. Symptoms were insisdious. Has sharp pain at L3-L4, L4-L5. Patient also has near constant R numbness. This numbness has been present since the original accident. Patient noted that last year R ankle started to roll in when walking on uneven surfaces. Patient had a tree fall on back in 1975 and had multiple surgeries in late 70s and early 80s. Surgeries cumulated in a lumbar fusion L3-L4, L4-L5. Patient has responded well to electrical stimulation and traction in the past.  Patient walks daily. Gardening is another of patient's hobbies. Patient avoids  "lifting as much as he can. Lifts no more than 25# and cannot do this from any lower than waist level. Making a bed and doing dishes causes the most pain. Patient limits this  Patient states that he sleeps 6 hours and then wakes sometimes due to pain. Patient takes sleeping pill. Patient sleeps on side and occasionally on back. Patient places pillows between knees.  Pt has extensive strengthening program he has been working on at gym typically 3x/week and stretching program at home.     Impairments A. Pain;D. Decreased ROM;E. Decreased flexibility;F. Decreased strength and endurance;H. Impaired gait   Functional Limitations perform activities of daily living;perform desired leisure / sports activities   Symptom Location back with radiation down into bilateral LEs   How/Where did it occur At work   Onset date of current episode/exacerbation 11/08/17  (date of MD order)   Chronicity Chronic   Pain rating (0-10 point scale) Best (/10);Worst (/10)   Best (/10) 3   Worst (/10) 10   Pain quality A. Sharp;C. Aching;F. Stabbing   Frequency of pain/symptoms B. Intermittent   Pain/symptoms are: (\"depends on the day\")   Pain/symptoms exacerbated by C. Lifting;D. Carrying;I. Bending;K. Home tasks;L. Work tasks   Pain/symptoms eased by A. Sitting;B. Walking;C. Rest;G. Heat;H. Cold;I. OTC medication(s);J. Braces/supports;K. Other   Pain eased by comment recently obtained a TENS but has not been able to set up   Progression of symptoms since onset: Worsened   Prior Level of Function   Prior Level of Function-Mobility independent   Prior Level of Function-ADLs independent   Current Level of Function   Patient role/employment history F. Retired;G. Disabled   Living environment House/townhome   Home/community accessibility stairs   Current equipment-Gait/Locomotion None   Fall Risk Screen   Fall screen completed by PT   Have you fallen 2 or more times in the past year? No   Have you fallen and had an injury in the past year? No   Is " patient a fall risk? No   Fall screen comments had 1 fall when getting out of bed, incidental.   Lumbar Spine/SI Objective Findings   Observation no acute distress   Posture forward flexed at hips   Balance/Proprioception (Single Leg Stance) impaired but equal from side to side   Flexion ROM fingertips to knees   Extension ROM 20%   Right Side Bending ROM fingertips to knees   Left Side Bending ROM fingertips to knees   Repeated Extension-Standing ROM NT   Repeated Flexion-Standing ROM NT   Lumbar ROM Comment Limited motion noted in all ranges   Hip Flexion (L2) Strength 5/5   Hip Abduction Strength 4-/5   Hip Adduction Strength 4-/5   Hip Extension Strength 4-/5   Knee Flexion Strength 5/5   Knee Extension (L3) Strength 5-/5   Ankle Dorsiflexion (L4) Strength 5-/5   Lumbar/Hip/Knee/Foot Strength Comments demonstrates good strength in LEs without significant impairment   Hamstring Flexibility tightness present bilaterally.   Palpation tenderness through L3-L5 region with palpation   Planned Therapy Interventions   Planned Therapy Interventions manual therapy;joint mobilization;ROM;strengthening;stretching   Planned Modality Interventions   Planned Modality Interventions Electrical stimulation;Traction   Clinical Impression   Criteria for Skilled Therapeutic Interventions Met yes, treatment indicated   PT Diagnosis Patient presents with recent exacerbation of chronic LBP with original injury occuring in 1975 with tree falling on patient. Patient has increased symptoms and decreased activity level the past several weeks    Influenced by the following impairments decreased lumbar ROM, pain   Functional limitations due to impairments increased pain with ADLs, decreased tolerance for functional mobility from baseline   Clinical Presentation Stable/Uncomplicated   Clinical Presentation Rationale exacerbation of chronic pain   Clinical Decision Making (Complexity) Low complexity   Therapy Frequency 2 times/Week   Predicted  Duration of Therapy Intervention (days/wks) 8 weeks   Risk & Benefits of therapy have been explained Yes   Patient, Family & other staff in agreement with plan of care Yes   Clinical Impression Comments Pt presents with exacerbation of chronic LBP related to work injury in 1975.  Pt responds well to traction and e-stim to allow him to return to his baseline pain level.   Education Assessment   Preferred Learning Style Listening   Barriers to Learning No barriers   ORTHO GOALS   PT Ortho Eval Goals 1;2   Ortho Goal 1   Goal Identifier LTG 1   Goal Description Patient will be able to return to prior activity level including transfers, community mobility without limitation from pain   Target Date 02/05/18   Ortho Goal 2   Goal Identifier LTG 2   Goal Description Pt will be able to sleep throughout the night without waking due to pain with decreased use of narcotics.   Target Date 02/05/18   Total Evaluation Time   Total Evaluation Time 18   Therapy Certification   Certification date from 12/12/17   Certification date to 02/05/18   Medical Diagnosis Chronic bilateral low back pain without sciatica   I certify the need for these services furnished under this plan of treatment and while under my care. (Physician co-signature of this document indicates review and certification of the therapy plan).

## 2017-12-13 ENCOUNTER — TELEPHONE (OUTPATIENT)
Dept: FAMILY MEDICINE | Facility: OTHER | Age: 82
End: 2017-12-13

## 2017-12-13 ASSESSMENT — ANXIETY QUESTIONNAIRES: GAD7 TOTAL SCORE: 0

## 2017-12-13 NOTE — TELEPHONE ENCOUNTER
Luzma from [employer] work [employer] calls they received a work comp Rx requst for Augmentin and would like to verify this is related to his work comp case. I called the pharmacy this was billed to work comp in error and has since been billed to the pt's regular insurance., I called tayla and notified the insurance made an error.

## 2017-12-15 ENCOUNTER — HOSPITAL ENCOUNTER (OUTPATIENT)
Dept: PHYSICAL THERAPY | Facility: HOSPITAL | Age: 82
Setting detail: THERAPIES SERIES
End: 2017-12-15
Attending: FAMILY MEDICINE
Payer: OTHER MISCELLANEOUS

## 2017-12-15 PROCEDURE — 97012 MECHANICAL TRACTION THERAPY: CPT | Mod: GP

## 2017-12-15 PROCEDURE — 97014 ELECTRIC STIMULATION THERAPY: CPT | Mod: GP

## 2017-12-15 PROCEDURE — 40000718 ZZHC STATISTIC PT DEPARTMENT ORTHO VISIT

## 2017-12-18 ENCOUNTER — HOSPITAL ENCOUNTER (OUTPATIENT)
Dept: PHYSICAL THERAPY | Facility: HOSPITAL | Age: 82
Setting detail: THERAPIES SERIES
End: 2017-12-18
Attending: FAMILY MEDICINE
Payer: OTHER MISCELLANEOUS

## 2017-12-18 PROCEDURE — 97014 ELECTRIC STIMULATION THERAPY: CPT | Mod: GP | Performed by: PHYSICAL THERAPIST

## 2017-12-18 PROCEDURE — 40000718 ZZHC STATISTIC PT DEPARTMENT ORTHO VISIT: Performed by: PHYSICAL THERAPIST

## 2017-12-18 PROCEDURE — 97012 MECHANICAL TRACTION THERAPY: CPT | Mod: GP | Performed by: PHYSICAL THERAPIST

## 2017-12-19 ENCOUNTER — HOSPITAL ENCOUNTER (OUTPATIENT)
Dept: GENERAL RADIOLOGY | Facility: HOSPITAL | Age: 82
Discharge: HOME | End: 2017-12-19
Attending: FAMILY MEDICINE | Admitting: FAMILY MEDICINE
Payer: MEDICARE

## 2017-12-19 ENCOUNTER — HOSPITAL ENCOUNTER (OUTPATIENT)
Dept: SPEECH THERAPY | Facility: HOSPITAL | Age: 82
Setting detail: THERAPIES SERIES
End: 2017-12-19
Attending: FAMILY MEDICINE
Payer: MEDICARE

## 2017-12-19 DIAGNOSIS — R09.89 PHLEGM IN THROAT: ICD-10-CM

## 2017-12-19 DIAGNOSIS — R13.10 DYSPHAGIA, UNSPECIFIED TYPE: ICD-10-CM

## 2017-12-19 PROCEDURE — G8996 SWALLOW CURRENT STATUS: HCPCS | Mod: GN,CJ

## 2017-12-19 PROCEDURE — 92526 ORAL FUNCTION THERAPY: CPT | Mod: GN

## 2017-12-19 PROCEDURE — 92611 MOTION FLUOROSCOPY/SWALLOW: CPT | Mod: GN

## 2017-12-19 PROCEDURE — 40000211 ZZHC STATISTIC SLP  DEPARTMENT VISIT

## 2017-12-19 PROCEDURE — G8997 SWALLOW GOAL STATUS: HCPCS | Mod: GN,CI

## 2017-12-19 PROCEDURE — 74230 X-RAY XM SWLNG FUNCJ C+: CPT | Mod: TC

## 2017-12-19 RX ORDER — BARIUM SULFATE 400 MG/ML
SUSPENSION ORAL ONCE
Status: COMPLETED | OUTPATIENT
Start: 2017-12-19 | End: 2017-12-19

## 2017-12-19 RX ADMIN — BARIUM SULFATE 15 ML: 400 SUSPENSION ORAL at 14:08

## 2017-12-21 NOTE — PROGRESS NOTES
12/19/17 1423   General Information   Type Of Visit Initial   Start Of Care Date 12/19/17   Referring Physician Eric Ulloa   Orders Evaluate And Treat   Orders Comment Phlegm in throat; Dysphagia   Medical Diagnosis Oropharyngeal Dysphagia   Precautions/limitations No Known Precautions/limitations   Hearing WNL   Pertinent History of Current Problem/OT: Additional Occupational Profile Info Patient stated he gets sticking in his throat, it feels like food gets stuck, at about the level of pt's hyoid. Pt stated he had an esophogram a few years ago and a previous swallowing evaluation over 15 years ago, at that time pt stated it was determined he had weakness in his throat.   Respiratory Status Room air   Patient Role/employment History Retired   Living Environment [place]   General Observations Patient was a good historian, pt stated he has had increased phlegm and can feel phlegm and certain foods (such as toast) get stuck in his throat. Pt pointed to the level of his hyoid bone, when asked where it feels like it sticks.   Patient/family Goals Patient would like to decrease the sticking of foods in his throat   General Information Comments Patient was pleasant and cooperative   Clinical Swallow Evaluation   Oral Musculature generally intact   Structural Abnormalities none present   Dentition upper and lower dentures   Mucosal Quality good   Mandibular Strength and Mobility intact   Oral Labial Strength and Mobility WFL   Lingual Strength and Mobility impaired anterior elevation   Velar Elevation intact   Buccal Strength and Mobility intact   Laryngeal Function Cough;Throat clear;Swallow;Voicing initiated;Dry swallow palpated   Oral Musculature Comments Patient demonstrated adequate oral musculature strength and ROM   VFSS Evaluation   VFSS Additional Documentation Yes   VFSS Eval: Radiology   Radiologist Dr. Small   Views Taken left lateral   Physical Location of Procedure DI Suite   VFSS Eval:  Thin Liquid Texture Trial   Mode of Presentation, Thin Liquid cup;self-fed   Order of Presentation 1, 2, 5   Preparatory Phase WFL   Oral Phase, Thin Liquid WFL   Pharyngeal Phase, Thin Liquid Residue in valleculae   Rosenbek's Penetration Aspiration Scale: Thin Liquid Trial Results 1 - no aspiration, contrast does not enter airway   Diagnostic Statement Residue remained in velleculae post swallow; additional hard swallows were elicited; however residues remain in velleculae; unable to clear   VFSS Eval: Nectar Thick Liquid Texture Trial   Mode of Presentation, Nectar cup;self-fed   Order of Presentation 3   Preparatory Phase WFL   Oral Phase, Nectar WFL   Pharyngeal Phase, Nectar Residue in valleculae   Rosenbek's Penetration Aspiration Scale: Nectar-Thick Liquid Trial Results 1 - no aspiration, contrast does not enter airway   Diagnostic Statement Residue remained in velleculae; hard additional swallow did not clear residue   VFSS Eval: Solid Food Texture Trial   Mode of Presentation, Solid self-fed   Order of Presentation 4   Preparatory Phase WFL   Oral Phase, Solid WFL   Pharyngeal Phase, Solid Residue in valleculae   Rosenbek's Penetration Aspiration Scale: Solid Food Trial Results 1 - no aspiration, contrast does not enter airway   Diagnostic Statement Mild residue remained in velleculae   Esophageal Phase of Swallow   Patient reports or presents with symptoms of esophageal dysphagia No   General Therapy Interventions   Planned Therapy Interventions Dysphagia Treatment   Dysphagia treatment Oropharyngeal exercise training;Modified diet education;Instruction of safe swallow strategies;Compensatory strategies for swallowing   Swallow Eval: Clinical Impressions   Skilled Criteria for Therapy Intervention Skilled criteria met.  Treatment indicated.   Dysphagia Outcome Severity Scale (YANDEL) Level 5 - YANDEL   Treatment Diagnosis Oropharyngeal Dysphagia   Diet texture recommendations Dysphagia diet level 3;Thin  liquids   Recommended Feeding/Eating Techniques alternate between small bites and sips of food/liquid;hard swallow w/ each bite or sip;maintain upright posture during/after eating for 30 mins;small sips/bites;tuck chin during every swallow   Rehab Potential good, to achieve stated therapy goals   Therapy Frequency other (see comments)  (2 times/wk)   Predicted Duration of Therapy Intervention (days/wks) 8 weeks   Anticipated Discharge Disposition home   Risks and Benefits of Treatment have been explained. Yes   Patient, family and/or staff in agreement with Plan of Care Yes   Swallow Goals   SLP Swallow Goals 1;2;3;4   Swallow Goal 1   Goal Identifier LTG 1   Goal Description Patient will demonstrate improvement of swallowing through consuming least restricted diet with no overt s/sx of asp/penx and decrease of sticking sensation in throat.   Target Date 02/15/18   Swallow Goal 2   Goal Identifier STG 1   Goal Description Patient will complete oropharyngeal conditioning home program with 90% compliance per pt report.   Target Date 02/15/18   Swallow Goal 3   Goal Identifier STG 2   Goal Description Patient will use compensatory strategies during trials with clinician independently and 90% success   Target Date 02/15/18   Swallow Goal 4   Goal Identifier STG 3   Goal Description Patient will increase oropharyngeal musculature per patient report from a level of feeling foods stickin in throat to a level of no 'sticking' in throat.    Target Date 02/15/18   Total Session Time   Total Session Time 60   Total Evaluation Time 30   Therapy Certification   Certification date from 12/19/17   Certification date to 02/15/18   Medical Diagnosis Oropharyngeal Dysphagia   Certification I certify the need for these services furnished under this plan of treatment and while under my care.  (Physician co-signature of this document indicates review and certification of the therapy plan).   SLP Medicare Only G-code   G-code Swallowing    Swallowing   Swallowing:  Current Status , Goal , Discharge -Hbxz Only-Modifier the same for all G-codes CJ: 20-39% impairment   Swallowing: Current  & Discharge Modifier Rationale-Eval Only Per ASAH guidelines and MBSS

## 2017-12-22 ENCOUNTER — HOSPITAL ENCOUNTER (OUTPATIENT)
Dept: PHYSICAL THERAPY | Facility: HOSPITAL | Age: 82
Setting detail: THERAPIES SERIES
End: 2017-12-22
Attending: FAMILY MEDICINE
Payer: OTHER MISCELLANEOUS

## 2017-12-22 ENCOUNTER — HOSPITAL ENCOUNTER (OUTPATIENT)
Dept: SPEECH THERAPY | Facility: HOSPITAL | Age: 82
Setting detail: THERAPIES SERIES
End: 2017-12-22
Attending: FAMILY MEDICINE
Payer: MEDICARE

## 2017-12-22 PROCEDURE — 92526 ORAL FUNCTION THERAPY: CPT | Mod: GN

## 2017-12-22 PROCEDURE — 97014 ELECTRIC STIMULATION THERAPY: CPT | Mod: GP

## 2017-12-22 PROCEDURE — 40000718 ZZHC STATISTIC PT DEPARTMENT ORTHO VISIT

## 2017-12-22 PROCEDURE — 97012 MECHANICAL TRACTION THERAPY: CPT | Mod: GP

## 2017-12-22 PROCEDURE — 40000211 ZZHC STATISTIC SLP  DEPARTMENT VISIT

## 2017-12-26 ENCOUNTER — HOSPITAL ENCOUNTER (OUTPATIENT)
Dept: PHYSICAL THERAPY | Facility: HOSPITAL | Age: 82
Setting detail: THERAPIES SERIES
End: 2017-12-26
Attending: FAMILY MEDICINE
Payer: OTHER MISCELLANEOUS

## 2017-12-26 PROCEDURE — 40000718 ZZHC STATISTIC PT DEPARTMENT ORTHO VISIT

## 2017-12-26 PROCEDURE — 97014 ELECTRIC STIMULATION THERAPY: CPT | Mod: GP

## 2017-12-26 PROCEDURE — 97012 MECHANICAL TRACTION THERAPY: CPT | Mod: GP

## 2017-12-28 ENCOUNTER — HOSPITAL ENCOUNTER (OUTPATIENT)
Dept: PHYSICAL THERAPY | Facility: HOSPITAL | Age: 82
Setting detail: THERAPIES SERIES
End: 2017-12-28
Attending: FAMILY MEDICINE
Payer: OTHER MISCELLANEOUS

## 2017-12-28 PROCEDURE — 40000718 ZZHC STATISTIC PT DEPARTMENT ORTHO VISIT

## 2017-12-28 PROCEDURE — 97014 ELECTRIC STIMULATION THERAPY: CPT | Mod: GP

## 2017-12-28 PROCEDURE — 97012 MECHANICAL TRACTION THERAPY: CPT | Mod: GP

## 2018-01-02 ENCOUNTER — HOSPITAL ENCOUNTER (OUTPATIENT)
Dept: PHYSICAL THERAPY | Facility: HOSPITAL | Age: 83
Setting detail: THERAPIES SERIES
End: 2018-01-02
Attending: FAMILY MEDICINE
Payer: OTHER MISCELLANEOUS

## 2018-01-02 ENCOUNTER — HOSPITAL ENCOUNTER (OUTPATIENT)
Dept: SPEECH THERAPY | Facility: HOSPITAL | Age: 83
Setting detail: THERAPIES SERIES
End: 2018-01-02
Attending: FAMILY MEDICINE
Payer: MEDICARE

## 2018-01-02 DIAGNOSIS — R25.9 ABNORMAL INVOLUNTARY MOVEMENT: ICD-10-CM

## 2018-01-02 PROCEDURE — 40000211 ZZHC STATISTIC SLP  DEPARTMENT VISIT

## 2018-01-02 PROCEDURE — 97012 MECHANICAL TRACTION THERAPY: CPT | Mod: GP | Performed by: PHYSICAL THERAPIST

## 2018-01-02 PROCEDURE — 97014 ELECTRIC STIMULATION THERAPY: CPT | Mod: GP | Performed by: PHYSICAL THERAPIST

## 2018-01-02 PROCEDURE — 92526 ORAL FUNCTION THERAPY: CPT | Mod: GN

## 2018-01-02 PROCEDURE — 40000718 ZZHC STATISTIC PT DEPARTMENT ORTHO VISIT: Performed by: PHYSICAL THERAPIST

## 2018-01-02 NOTE — TELEPHONE ENCOUNTER
Flexeril      Last Written Prescription Date: 12/5/17  Last Fill Quantity: 60,  # refills: 0   Last Office Visit with G, P or [facility] prescribing provider: 12/12/17

## 2018-01-04 ENCOUNTER — HOSPITAL ENCOUNTER (OUTPATIENT)
Dept: SPEECH THERAPY | Facility: HOSPITAL | Age: 83
Setting detail: THERAPIES SERIES
End: 2018-01-04
Attending: FAMILY MEDICINE
Payer: MEDICARE

## 2018-01-04 ENCOUNTER — HOSPITAL ENCOUNTER (OUTPATIENT)
Dept: PHYSICAL THERAPY | Facility: HOSPITAL | Age: 83
Setting detail: THERAPIES SERIES
End: 2018-01-04
Attending: FAMILY MEDICINE
Payer: OTHER MISCELLANEOUS

## 2018-01-04 PROCEDURE — 40000718 ZZHC STATISTIC PT DEPARTMENT ORTHO VISIT

## 2018-01-04 PROCEDURE — 40000211 ZZHC STATISTIC SLP  DEPARTMENT VISIT

## 2018-01-04 PROCEDURE — 97012 MECHANICAL TRACTION THERAPY: CPT | Mod: GP

## 2018-01-04 PROCEDURE — 92526 ORAL FUNCTION THERAPY: CPT | Mod: GN

## 2018-01-04 PROCEDURE — 97014 ELECTRIC STIMULATION THERAPY: CPT | Mod: GP

## 2018-01-04 RX ORDER — CYCLOBENZAPRINE HCL 10 MG
TABLET ORAL
Qty: 60 TABLET | Refills: 0 | Status: SHIPPED | OUTPATIENT
Start: 2018-01-04 | End: 2018-01-31

## 2018-01-08 ENCOUNTER — HOSPITAL ENCOUNTER (OUTPATIENT)
Dept: SPEECH THERAPY | Facility: HOSPITAL | Age: 83
Setting detail: THERAPIES SERIES
End: 2018-01-08
Attending: FAMILY MEDICINE
Payer: MEDICARE

## 2018-01-08 ENCOUNTER — HOSPITAL ENCOUNTER (OUTPATIENT)
Dept: PHYSICAL THERAPY | Facility: HOSPITAL | Age: 83
Setting detail: THERAPIES SERIES
End: 2018-01-08
Attending: FAMILY MEDICINE
Payer: OTHER MISCELLANEOUS

## 2018-01-08 PROCEDURE — 92526 ORAL FUNCTION THERAPY: CPT | Mod: GN

## 2018-01-08 PROCEDURE — 40000718 ZZHC STATISTIC PT DEPARTMENT ORTHO VISIT

## 2018-01-08 PROCEDURE — 97012 MECHANICAL TRACTION THERAPY: CPT | Mod: GP

## 2018-01-08 PROCEDURE — 97014 ELECTRIC STIMULATION THERAPY: CPT | Mod: GP

## 2018-01-08 PROCEDURE — 40000211 ZZHC STATISTIC SLP  DEPARTMENT VISIT

## 2018-01-11 ENCOUNTER — HOSPITAL ENCOUNTER (OUTPATIENT)
Dept: PHYSICAL THERAPY | Facility: HOSPITAL | Age: 83
Setting detail: THERAPIES SERIES
End: 2018-01-11
Attending: FAMILY MEDICINE
Payer: OTHER MISCELLANEOUS

## 2018-01-11 PROCEDURE — 97012 MECHANICAL TRACTION THERAPY: CPT | Mod: GP

## 2018-01-11 PROCEDURE — 40000718 ZZHC STATISTIC PT DEPARTMENT ORTHO VISIT

## 2018-01-11 PROCEDURE — 97014 ELECTRIC STIMULATION THERAPY: CPT | Mod: GP

## 2018-01-12 ENCOUNTER — TRANSFERRED RECORDS (OUTPATIENT)
Dept: HEALTH INFORMATION MANAGEMENT | Facility: HOSPITAL | Age: 83
End: 2018-01-12

## 2018-01-15 ENCOUNTER — HOSPITAL ENCOUNTER (OUTPATIENT)
Dept: PHYSICAL THERAPY | Facility: HOSPITAL | Age: 83
Setting detail: THERAPIES SERIES
End: 2018-01-15
Attending: FAMILY MEDICINE
Payer: OTHER MISCELLANEOUS

## 2018-01-15 ENCOUNTER — HOSPITAL ENCOUNTER (OUTPATIENT)
Dept: SPEECH THERAPY | Facility: HOSPITAL | Age: 83
Setting detail: THERAPIES SERIES
End: 2018-01-15
Attending: FAMILY MEDICINE
Payer: MEDICARE

## 2018-01-15 PROCEDURE — G8998 SWALLOW D/C STATUS: HCPCS | Mod: GN,CI

## 2018-01-15 PROCEDURE — 40000211 ZZHC STATISTIC SLP  DEPARTMENT VISIT

## 2018-01-15 PROCEDURE — 92526 ORAL FUNCTION THERAPY: CPT | Mod: GN

## 2018-01-15 PROCEDURE — 97014 ELECTRIC STIMULATION THERAPY: CPT | Mod: GP | Performed by: PHYSICAL THERAPIST

## 2018-01-15 PROCEDURE — 97012 MECHANICAL TRACTION THERAPY: CPT | Mod: GP | Performed by: PHYSICAL THERAPIST

## 2018-01-15 PROCEDURE — 40000718 ZZHC STATISTIC PT DEPARTMENT ORTHO VISIT: Performed by: PHYSICAL THERAPIST

## 2018-01-15 PROCEDURE — G8997 SWALLOW GOAL STATUS: HCPCS | Mod: GN,CI

## 2018-01-15 NOTE — PROGRESS NOTES
Outpatient Speech Language Pathology Discharge Note     Patient: Kenyon Rivera  : 1934    Beginning/End Dates of Reporting Period:  17 to 1/15/2018    Referring Provider: Dr. Eric Bernal    Therapy Diagnosis: Oropharyngeal Dysphagia    Client Self Report: Pt was seen for a skilled speech language session from [id number]; pt was pleasant and cooperative ;pt reports he is doing great with swallowin at home, no difficulties     Objective Measurements:      Objective Measure: Chin tuck  Details: 100% independent  Objective Measure: Effortful w/meal  Details: 100% independent  Objective Measure: Sm Singular Bolus  Details: 100% independence  Objective Measure: cyclic ingestion  Details: 100% independently    Goals:  Goal Identifier LTG 1   Goal Description Patient will demonstrate improvement of swallowing through consuming least restricted diet with no overt s/sx of asp/penx and decrease of sticking sensation in throat.   Target Date 02/15/18   Date Met  01/15/18   Progress:     Goal Identifier STG 1   Goal Description Patient will complete oropharyngeal conditioning home program with 90% compliance per pt report.   Target Date 02/15/18   Date Met  01/15/18   Progress:     Goal Identifier STG 2   Goal Description Patient will use compensatory strategies during trials with clinician independently and 90% success   Target Date 02/15/18   Date Met  01/15/18   Progress:     Goal Identifier STG 3   Goal Description Patient will increase oropharyngeal musculature per patient report from a level of feeling foods stickin in throat to a level of no 'sticking' in throat.    Target Date 02/15/18   Date Met  01/15/18   Progress:       Progress Toward Goals:   Progress this reporting period: Patient is demonstrating improved swallowing function and no difficulties at home; pt stated he completes compensatory strategies a javier with no difficulties.      Plan:  Discharge from therapy.    Discharge:    Reason for  Discharge: Patient has met all goals.    Equipment Issued: none    Discharge Plan: Patient to continue home program.

## 2018-01-19 ENCOUNTER — HOSPITAL ENCOUNTER (OUTPATIENT)
Dept: PHYSICAL THERAPY | Facility: HOSPITAL | Age: 83
Setting detail: THERAPIES SERIES
End: 2018-01-19
Attending: FAMILY MEDICINE
Payer: OTHER MISCELLANEOUS

## 2018-01-19 PROCEDURE — G8980 MOBILITY D/C STATUS: HCPCS | Mod: GP,CI | Performed by: PHYSICAL THERAPIST

## 2018-01-19 PROCEDURE — 40000718 ZZHC STATISTIC PT DEPARTMENT ORTHO VISIT: Performed by: PHYSICAL THERAPIST

## 2018-01-19 PROCEDURE — 97014 ELECTRIC STIMULATION THERAPY: CPT | Mod: GP | Performed by: PHYSICAL THERAPIST

## 2018-01-19 PROCEDURE — 97012 MECHANICAL TRACTION THERAPY: CPT | Mod: GP | Performed by: PHYSICAL THERAPIST

## 2018-01-19 PROCEDURE — G8979 MOBILITY GOAL STATUS: HCPCS | Mod: GP,CI | Performed by: PHYSICAL THERAPIST

## 2018-01-26 ENCOUNTER — OFFICE VISIT (OUTPATIENT)
Dept: FAMILY MEDICINE | Facility: OTHER | Age: 83
End: 2018-01-26
Attending: FAMILY MEDICINE
Payer: COMMERCIAL

## 2018-01-26 VITALS
TEMPERATURE: 97.8 F | WEIGHT: 200 LBS | RESPIRATION RATE: 16 BRPM | HEIGHT: 67 IN | HEART RATE: 93 BPM | BODY MASS INDEX: 31.39 KG/M2 | OXYGEN SATURATION: 94 % | SYSTOLIC BLOOD PRESSURE: 132 MMHG | DIASTOLIC BLOOD PRESSURE: 70 MMHG

## 2018-01-26 DIAGNOSIS — Z01.818 PREOP GENERAL PHYSICAL EXAM: Primary | ICD-10-CM

## 2018-01-26 LAB
ANION GAP SERPL CALCULATED.3IONS-SCNC: 9 MMOL/L (ref 3–14)
BASOPHILS # BLD AUTO: 0 10E9/L (ref 0–0.2)
BASOPHILS NFR BLD AUTO: 0.6 %
BUN SERPL-MCNC: 31 MG/DL (ref 7–30)
CALCIUM SERPL-MCNC: 9 MG/DL (ref 8.5–10.1)
CHLORIDE SERPL-SCNC: 105 MMOL/L (ref 94–109)
CO2 SERPL-SCNC: 28 MMOL/L (ref 20–32)
CREAT SERPL-MCNC: 1.55 MG/DL (ref 0.66–1.25)
DIFFERENTIAL METHOD BLD: ABNORMAL
EOSINOPHIL # BLD AUTO: 0.2 10E9/L (ref 0–0.7)
EOSINOPHIL NFR BLD AUTO: 2.5 %
ERYTHROCYTE [DISTWIDTH] IN BLOOD BY AUTOMATED COUNT: 15.6 % (ref 10–15)
GFR SERPL CREATININE-BSD FRML MDRD: 43 ML/MIN/1.7M2
GLUCOSE SERPL-MCNC: 103 MG/DL (ref 70–99)
HCT VFR BLD AUTO: 40.4 % (ref 40–53)
HGB BLD-MCNC: 13.2 G/DL (ref 13.3–17.7)
LYMPHOCYTES # BLD AUTO: 2.2 10E9/L (ref 0.8–5.3)
LYMPHOCYTES NFR BLD AUTO: 31.3 %
MCH RBC QN AUTO: 28.8 PG (ref 26.5–33)
MCHC RBC AUTO-ENTMCNC: 32.7 G/DL (ref 31.5–36.5)
MCV RBC AUTO: 88 FL (ref 78–100)
MONOCYTES # BLD AUTO: 0.8 10E9/L (ref 0–1.3)
MONOCYTES NFR BLD AUTO: 11.1 %
NEUTROPHILS # BLD AUTO: 3.9 10E9/L (ref 1.6–8.3)
NEUTROPHILS NFR BLD AUTO: 54.5 %
PLATELET # BLD AUTO: 337 10E9/L (ref 150–450)
POTASSIUM SERPL-SCNC: 4.7 MMOL/L (ref 3.4–5.3)
RBC # BLD AUTO: 4.58 10E12/L (ref 4.4–5.9)
SODIUM SERPL-SCNC: 142 MMOL/L (ref 133–144)
WBC # BLD AUTO: 7.1 10E9/L (ref 4–11)

## 2018-01-26 PROCEDURE — 85025 COMPLETE CBC W/AUTO DIFF WBC: CPT | Mod: ZL | Performed by: FAMILY MEDICINE

## 2018-01-26 PROCEDURE — 99214 OFFICE O/P EST MOD 30 MIN: CPT | Mod: 25 | Performed by: FAMILY MEDICINE

## 2018-01-26 PROCEDURE — 93010 ELECTROCARDIOGRAM REPORT: CPT | Performed by: INTERNAL MEDICINE

## 2018-01-26 PROCEDURE — 80048 BASIC METABOLIC PNL TOTAL CA: CPT | Mod: ZL | Performed by: FAMILY MEDICINE

## 2018-01-26 PROCEDURE — 93005 ELECTROCARDIOGRAM TRACING: CPT

## 2018-01-26 PROCEDURE — 36415 COLL VENOUS BLD VENIPUNCTURE: CPT | Mod: ZL | Performed by: FAMILY MEDICINE

## 2018-01-26 PROCEDURE — G0463 HOSPITAL OUTPT CLINIC VISIT: HCPCS

## 2018-01-26 ASSESSMENT — ANXIETY QUESTIONNAIRES
GAD7 TOTAL SCORE: 0
IF YOU CHECKED OFF ANY PROBLEMS ON THIS QUESTIONNAIRE, HOW DIFFICULT HAVE THESE PROBLEMS MADE IT FOR YOU TO DO YOUR WORK, TAKE CARE OF THINGS AT HOME, OR GET ALONG WITH OTHER PEOPLE: NOT DIFFICULT AT ALL
2. NOT BEING ABLE TO STOP OR CONTROL WORRYING: NOT AT ALL
6. BECOMING EASILY ANNOYED OR IRRITABLE: NOT AT ALL
3. WORRYING TOO MUCH ABOUT DIFFERENT THINGS: NOT AT ALL
7. FEELING AFRAID AS IF SOMETHING AWFUL MIGHT HAPPEN: NOT AT ALL
1. FEELING NERVOUS, ANXIOUS, OR ON EDGE: NOT AT ALL
4. TROUBLE RELAXING: NOT AT ALL
5. BEING SO RESTLESS THAT IT IS HARD TO SIT STILL: NOT AT ALL

## 2018-01-26 ASSESSMENT — PAIN SCALES - GENERAL: PAINLEVEL: MILD PAIN (2)

## 2018-01-26 NOTE — PROGRESS NOTES
[address]  [facility] [place] [address]  [phone]  Dept: [phone]    PRE-OP EVALUATION:  Today's date: 2018    Kenyon Rivera (: 1934) presents for pre-operative evaluation assessment as requested by Dr. Ball.  He requires evaluation and anesthesia risk assessment prior to undergoing surgery/procedure for treatment of trigger finger .  Proposed procedure: Right ring finger digital and palmar fasciectomy    Date of Surgery/ Procedure: 18  Time of Surgery/ Procedure: [facility]  Hospital/Surgical Facility: [facility]    Primary Physician: Eric Bernal  Type of Anesthesia Anticipated: to be determined    Patient has a Health Care Directive or Living Will:  YES on record here    1. NO - Do you have a history of heart attack, stroke, stent, bypass or surgery on an artery in the head, neck, heart or legs?  2. NO - Do you ever have any pain or discomfort in your chest?  3. NO - Do you have a history of  Heart Failure?  4. NO - Are you troubled by shortness of breath when: walking on the level, up a slight hill or at night?  5. NO - Do you currently have a cold, bronchitis or other respiratory infection?  6. NO - Do you have a cough, shortness of breath or wheezing?  7. NO - Do you sometimes get pains in the calves of your legs when you walk?  8. NO - Do you or anyone in your family have previous history of blood clots?  9. NO - Do you or does anyone in your family have a serious bleeding problem such as prolonged bleeding following surgeries or cuts?  10. YES - HAVE YOU EVER HAD PROBLEMS WITH ANEMIA OR BEEN TOLD TO TAKE IRON PILLS? Low 11/2 years ago treated and has been normal since  11. NO - Have you had any abnormal blood loss such as black, tarry or bloody stools, or abnormal vaginal bleeding?  12. NO - Have you ever had a blood transfusion?  13. NO - Have you or any of your relatives ever had problems with anesthesia?  14. NO - Do you have sleep  apnea, excessive snoring or daytime drowsiness?  15. NO - Do you have any prosthetic heart valves?  16. YES - DO YOU HAVE PROSTHETIC JOINTS? Right hip  17. NO - Is there any chance that you may be pregnant?      HPI:                                                      Brief HPI related to upcoming procedure: right ring finger contracture.       See problem list for active medical problems.  Problems all longstanding and stable, except as noted/documented.  See ROS for pertinent symptoms related to these conditions.                                                                                                  .    MEDICAL HISTORY:                                                      Patient Active Problem List    Diagnosis Date Noted     Blood glucose elevated 02/27/2017     Priority: Medium     Controlled substance agreement signed 10/31/2016     Priority: Medium     ACP (advance care planning) 06/15/2016     Priority: Medium     Advance Care Planning 6/15/2016: ACP Review of Chart / Resources Provided:  Reviewed chart for advance care plan.  Kenyon Rivera has been provided information and resources to begin or update their advance care plan.  Added by Adriana Palmer             Advanced care planning/counseling discussion 12/13/2012     Priority: Medium     Hypertension, Benign 07/11/2011     Priority: Medium     Insomnia 03/17/2011     Priority: Medium     Problem list name updated by automated process. Provider to review       Hypertrophy of prostate without urinary obstruction 01/04/2000     Priority: Medium     Problem list name updated by automated process. Provider to review       Hyperlipidemia 12/14/1999     Priority: Medium     Problem list name updated by automated process. Provider to review       Lumbago 11/02/1999     Priority: Medium      Past Medical History:   Diagnosis Date     Calculus of gallbladder with other cholecystitis, without mention of obstruction 11/18/2004     Displacement of  lumbar intervertebral disc without myelopathy 11/17/2000     Hyperplasia of prostate 01/04/2000     Hypertension, Benign 07/11/2011     Insomnia, unspecified 03/17/2011     Lumbago 11/02/1999     Nonallopathic lesion of cervical region, not elsewhere classified 12/19/2001     Nonallopathic lesion of lumbar region, not elsewhere classified 03/05/2003     Nonallopathic lesion of thoracic region, not elsewhere classified 11/17/2000     Other and unspecified hyperlipidemia 12/14/1999     Other diseases of respiratory system, not elsewhere classified 04/21/2004     Other malaise and fatigue 04/05/2006     Pneumonia, organism unspecified(486) 12/13/1999     Past Surgical History:   Procedure Laterality Date     APPENDECTOMY       cataract extraction and lens implantation  3/2007    LEFT     CHOLECYSTECTOMY       electric stimulator implant       excision of Dupuytren's contracture extending into ring finger  02/08/2008    Bilateral     inguinal herniography      LEFT     NEPHRECTOMY  1992    RIGHT > Renal Cancer     OPEN REDUCTION INTERNAL FIXATION ANKLE      RIGHT     penile implant       TURP       Current Outpatient Prescriptions   Medication Sig Dispense Refill     cyclobenzaprine (FLEXERIL) 10 MG tablet TAKE 1 TABLET(10 MG) BY MOUTH TWICE DAILY 60 tablet 0     amoxicillin-clavulanate (AUGMENTIN) 875-125 MG per tablet Take 1 tablet by mouth 2 times daily 20 tablet 0     order for DME Tens unit and supplies 1 each 11     order for DME Tens unit. 1 each 1     traMADol (ULTRAM) 50 MG tablet Take 1 tablet (50 mg) by mouth every 8 hours as needed for pain 90 tablet 5     order for DME Elastic Back brace with stays and velcro enclosure 1 each 0     senna-docusate (SENOKOT-S;PERICOLACE) 8.6-50 MG per tablet Take 1 tablet by mouth daily       calcium carbonate (TUMS) 500 MG chewable tablet Take 1 chew tab by mouth every 4 hours as needed for heartburn       SIMETHICONE-80 PO Take 80 mg by mouth every 4 hours as needed for  intestinal gas       albuterol (PROAIR HFA, PROVENTIL HFA, VENTOLIN HFA) 108 (90 BASE) MCG/ACT inhaler Inhale 2 puffs into the lungs every 4 hours as needed for shortness of breath / dyspnea or wheezing       cetirizine (ZYRTEC) 10 MG tablet Take 10 mg by mouth daily as needed for allergies       IBUPROFEN PO Take 200 mg by mouth every 6 hours as needed for moderate pain       diclofenac (VOLTAREN) 1 % GEL Apply 2 g topically every 6 hours as needed #2 tubes written by Dr Berumen and 2 refills.       temazepam (RESTORIL) 30 MG capsule Take 1 capsule by mouth nightly as needed       acetaminophen (TYLENOL) 500 MG tablet Take 500-1,000 mg by mouth every 6 hours as needed       Cholecalciferol (VITAMIN D3) 2000 UNITS CAPS Take by mouth daily       Omega-3 Fatty Acids (FISH OIL) 1200 MG CAPS Take by mouth daily Fish oil 1290 omega 3 900       aspirin 81 MG tablet Take 81 mg by mouth daily        losartan (COZAAR) 50 MG tablet Take 1.5 tablets by mouth daily.       omeprazole 20 MG tablet Take 1 tablet by mouth 2 times daily.       simvastatin (ZOCOR) 20 MG tablet Take 1 tablet by mouth daily.       tamsulosin (FLOMAX) 0.4 MG 24 hr capsule Take 1 capsule by mouth At Bedtime.       OTC products: None, except as noted above    Allergies   Allergen Reactions     Chlorzoxazone      Parafon Forte      Latex Allergy: NO    Social History   Substance Use Topics     Smoking status: Former Smoker     Types: Cigarettes     Quit date: 1/30/1966     Smokeless tobacco: Never Used      Comment: Tried to Quit (YES)     Alcohol use Yes      Comment: RARELY     History   Drug Use No       REVIEW OF SYSTEMS:                                                    C: NEGATIVE for fever, chills, change in weight  I: NEGATIVE for worrisome rashes, moles or lesions  E: NEGATIVE for vision changes or irritation  E/M: NEGATIVE for ear, mouth and throat problems  R: NEGATIVE for significant cough or SOB  B: NEGATIVE for masses, tenderness or  "discharge  CV: NEGATIVE for chest pain, palpitations or peripheral edema  GI: NEGATIVE for nausea, abdominal pain, heartburn, or change in bowel habits  : NEGATIVE for frequency, dysuria, or hematuria  M: NEGATIVE for significant arthralgias or myalgia  N: NEGATIVE for weakness, dizziness or paresthesias  E: NEGATIVE for temperature intolerance, skin/hair changes  H: NEGATIVE for bleeding problems  P: NEGATIVE for changes in mood or affect    EXAM:                                                    /70  Pulse 93  Temp 97.8  F (36.6  C) (Tympanic)  Resp 16  Ht 5' 6.5\" (1.689 m)  Wt 200 lb (90.7 kg)  SpO2 94%  BMI 31.8 kg/m2    GENERAL APPEARANCE: healthy, alert and no distress     EYES: EOMI,  PERRL     HENT: ear canals and TM's normal and nose and mouth without ulcers or lesions     NECK: no adenopathy, no asymmetry, masses, or scars and thyroid normal to palpation     RESP: lungs clear to auscultation - no rales, rhonchi or wheezes     CV: regular rates and rhythm, normal S1 S2, no S3 or S4 and no murmur, click or rub     ABDOMEN:  soft, nontender, no HSM or masses and bowel sounds normal     MS: extremities normal- no gross deformities noted, no evidence of inflammation in joints, FROM in all extremities.     SKIN: no suspicious lesions or rashes     NEURO: Normal strength and tone, sensory exam grossly normal, mentation intact and speech normal     PSYCH: mentation appears normal. and affect normal/bright     LYMPHATICS: No axillary, cervical, or supraclavicular nodes    DIAGNOSTICS:                                                    EKG: appears normal, NSR, normal axis, normal intervals, no acute ST/T changes c/w ischemia, no LVH by voltage criteria.  Cbc stable with borderline HGB c/w previous.     Recent Labs   Lab Test  02/23/17   0813  01/21/16   0801   HGB  13.8  12.7*   PLT  319  384   NA  142  143   POTASSIUM  5.0  4.1   CR  1.46*  1.15   A1C  6.7*  6.8*        IMPRESSION:                  "                                   Reason for surgery/procedure: right hand contracture.   Diagnosis/reason for consult: preop clearance.     The proposed surgical procedure is considered LOW risk.    REVISED CARDIAC RISK INDEX  The patient has the following serious cardiovascular risks for perioperative complications such as (MI, PE, VFib and 3  AV Block):  No serious cardiac risks  INTERPRETATION: 0 risks: Class I (very low risk - 0.4% complication rate)    The patient has the following additional risks for perioperative complications:  No identified additional risks      ICD-10-CM    1. Preop general physical exam Z01.818        RECOMMENDATIONS:                                                      --Consult hospital rounder / IM to assist post-op medical management    --Patient is to take all scheduled medications on the day of surgery EXCEPT for modifications listed below.    APPROVAL GIVEN to proceed with proposed procedure, without further diagnostic evaluation       Signed Electronically by: Eric Bernal MD    Copy of this evaluation report is provided to requesting physician.    [facility] Preop Guidelines

## 2018-01-26 NOTE — NURSING NOTE
"Chief Complaint   Patient presents with     Pre-Op Exam     right ring finger, trigger release Dr. Ball. [facility] 2-5-18       Initial /80  Pulse 93  Temp 97.8  F (36.6  C) (Tympanic)  Resp 16  Ht 5' 6.5\" (1.689 m)  Wt 200 lb (90.7 kg)  SpO2 94%  BMI 31.8 kg/m2 Estimated body mass index is 31.8 kg/(m^2) as calculated from the following:    Height as of this encounter: 5' 6.5\" (1.689 m).    Weight as of this encounter: 200 lb (90.7 kg).  Medication Reconciliation: complete   Eunice Tripp    "

## 2018-01-26 NOTE — MR AVS SNAPSHOT
After Visit Summary   1/26/2018    Kenyon Rivera    MRN: [id number]           Patient Information     Date Of Birth          5/31/1934        Visit Information        Provider Department      1/26/2018 10:30 AM Eric Bernal MD [facility]        Today's Diagnoses     Preop general physical exam    -  1      Care Instructions      Before Your Surgery      Call your surgeon if there is any change in your health. This includes signs of a cold or flu (such as a sore throat, runny nose, cough, rash or fever).    Do not smoke, drink alcohol or take over the counter medicine (unless your surgeon or primary care doctor tells you to) for the 24 hours before and after surgery.    If you take prescribed drugs: Follow your doctor s orders about which medicines to take and which to stop until after surgery.    Eating and drinking prior to surgery: follow the instructions from your surgeon    Take a shower or bath the night before surgery. Use the soap your surgeon gave you to gently clean your skin. If you do not have soap from your surgeon, use your regular soap. Do not shave or scrub the surgery site.  Wear clean pajamas and have clean sheets on your bed.           Follow-ups after your visit        Who to contact     If you have questions or need follow up information about today's clinic visit or your schedule please contact [facility] directly at [phone].  Normal or non-critical lab and imaging results will be communicated to you by MyChart, letter or phone within 4 business days after the clinic has received the results. If you do not hear from us within 7 days, please contact the clinic through MyChart or phone. If you have a critical or abnormal lab result, we will notify you by phone as soon as possible.  Submit refill requests through [employer] or call your pharmacy and they will forward the refill request to us. Please allow 3 business days for your refill to be  "completed.          Additional Information About Your Visit        [employer] Information     [employer] lets you send messages to your doctor, view your test results, renew your prescriptions, schedule appointments and more. To sign up, go to [email or web address] . Click on \"Log in\" on the left side of the screen, which will take you to the Welcome page. Then click on \"Sign up Now\" on the right side of the page.     You will be asked to enter the access code listed below, as well as some personal information. Please follow the directions to create your username and password.     Your access code is: [email or web address]  Expires: 2018 12:51 PM     Your access code will  in 90 days. If you need help or a new code, please call your [facility] clinic or [phone].        Care EveryWhere ID     This is your [facility] EveryWhere ID. This could be used by other organizations to access your [facility] medical records  [id number]        Your Vitals Were     Pulse Temperature Respirations Height Pulse Oximetry BMI (Body Mass Index)    93 97.8  F (36.6  C) (Tympanic) 16 5' 6.5\" (1.689 m) 94% 31.8 kg/m2       Blood Pressure from Last 3 Encounters:   18 132/70   17 128/66   17 132/58    Weight from Last 3 Encounters:   18 200 lb (90.7 kg)   17 195 lb (88.5 kg)   17 199 lb (90.3 kg)              We Performed the Following     Basic metabolic panel     CBC with platelets and differential     EKG 12-lead complete w/read - (Clinic Performed)        Primary Care Provider Office Phone # Fax #    Eric Bernal [phone]       [address] [address]        Equal Access to Services     ERUM HOUSE : Rolanda underwood Sodolores, waaxda luqadaha, qaybta kaalmada adeegyaluis, burton islas. [facility] [phone].    ATENCIÓN: Si habla español, tiene a estrella disposición servicios gratuitos de asistencia lingüística. Llame al " [phone].    We comply with applicable federal civil rights laws and [place] laws. We do not discriminate on the basis of race, color, national origin, age, disability, sex, sexual orientation, or gender identity.            Thank you!     Thank you for choosing [facility] (formerly [facility])  for your care. Our goal is always to provide you with excellent care. Hearing back from our patients is one way we can continue to improve our services. Please take a few minutes to complete the written survey that you may receive in the mail after your visit with us. Thank you!             Your Updated Medication List - Protect others around you: Learn how to safely use, store and throw away your medicines at www.disposemymeds.org.          This list is accurate as of 1/26/18 12:51 PM.  Always use your most recent med list.                   Brand Name Dispense Instructions for use Diagnosis    acetaminophen 500 MG tablet    TYLENOL     Take 500-1,000 mg by mouth every 6 hours as needed        albuterol 108 (90 BASE) MCG/ACT Inhaler    PROAIR HFA/PROVENTIL HFA/VENTOLIN HFA     Inhale 2 puffs into the lungs every 4 hours as needed for shortness of breath / dyspnea or wheezing        amoxicillin-clavulanate 875-125 MG per tablet    AUGMENTIN    20 tablet    Take 1 tablet by mouth 2 times daily    Phlegm in throat, Dysphagia, unspecified type       aspirin 81 MG tablet      Take 81 mg by mouth daily        calcium carbonate 500 MG chewable tablet    TUMS     Take 1 chew tab by mouth every 4 hours as needed for heartburn        cetirizine 10 MG tablet    zyrTEC     Take 10 mg by mouth daily as needed for allergies        cyclobenzaprine 10 MG tablet    FLEXERIL    60 tablet    TAKE 1 TABLET(10 MG) BY MOUTH TWICE DAILY    Abnormal involuntary movement       fish Oil 1200 MG capsule      Take by mouth daily Fish oil 1290 omega 3 900        FLOMAX 0.4 MG capsule   Generic drug:  tamsulosin      Take 1 capsule by mouth At Bedtime.         IBUPROFEN PO      Take 200 mg by mouth every 6 hours as needed for moderate pain        losartan 50 MG tablet    COZAAR     Take 1.5 tablets by mouth daily.        omeprazole 20 MG tablet      Take 1 tablet by mouth 2 times daily.        * order for DME     1 each    Elastic Back brace with stays and velcro enclosure    Chronic midline low back pain without sciatica       * order for DME     1 each    Tens unit.    Lumbar paraspinal muscle spasm       * order for DME     1 each    Tens unit and supplies    Chronic bilateral low back pain with sciatica, sciatica laterality unspecified       senna-docusate 8.6-50 MG per tablet    SENOKOT-S;PERICOLACE     Take 1 tablet by mouth daily        SIMETHICONE-80 PO      Take 80 mg by mouth every 4 hours as needed for intestinal gas        temazepam 30 MG capsule    RESTORIL     Take 1 capsule by mouth nightly as needed        traMADol 50 MG tablet    ULTRAM    90 tablet    Take 1 tablet (50 mg) by mouth every 8 hours as needed for pain    Bilateral low back pain with sciatica, sciatica laterality unspecified, unspecified chronicity       vitamin D3 2000 UNITS Caps      Take by mouth daily        VOLTAREN 1 % Gel topical gel   Generic drug:  diclofenac      Apply 2 g topically every 6 hours as needed #2 tubes written by Dr Berumen and 2 refills.        ZOCOR 20 MG tablet   Generic drug:  simvastatin      Take 1 tablet by mouth daily.        * Notice:  This list has 3 medication(s) that are the same as other medications prescribed for you. Read the directions carefully, and ask your doctor or other care provider to review them with you.

## 2018-01-27 ASSESSMENT — ANXIETY QUESTIONNAIRES: GAD7 TOTAL SCORE: 0

## 2018-01-27 ASSESSMENT — PATIENT HEALTH QUESTIONNAIRE - PHQ9: SUM OF ALL RESPONSES TO PHQ QUESTIONS 1-9: 0

## 2018-01-29 ENCOUNTER — TELEPHONE (OUTPATIENT)
Dept: FAMILY MEDICINE | Facility: OTHER | Age: 83
End: 2018-01-29

## 2018-01-29 NOTE — TELEPHONE ENCOUNTER
Faxed preop Surgery 2/5/18 [facility]  [facility] fx# [phone] & fx# [phone]  fxd demo,med list, H & P 1/26/18 Dr Bernal, labs ,EKG  Dx: procedure/ Right ring finger digital and palmar fasciectomy  Eri Arreola

## 2018-01-31 DIAGNOSIS — R25.9 ABNORMAL INVOLUNTARY MOVEMENT: ICD-10-CM

## 2018-01-31 RX ORDER — CYCLOBENZAPRINE HCL 10 MG
TABLET ORAL
Qty: 60 TABLET | Refills: 0 | Status: SHIPPED | OUTPATIENT
Start: 2018-01-31 | End: 2018-02-28

## 2018-02-13 ENCOUNTER — TRANSFERRED RECORDS (OUTPATIENT)
Dept: HEALTH INFORMATION MANAGEMENT | Facility: HOSPITAL | Age: 83
End: 2018-02-13

## 2018-02-16 ENCOUNTER — HOSPITAL ENCOUNTER (OUTPATIENT)
Dept: OCCUPATIONAL THERAPY | Facility: HOSPITAL | Age: 83
Setting detail: THERAPIES SERIES
End: 2018-02-16
Attending: SPECIALIST
Payer: MEDICARE

## 2018-02-16 PROCEDURE — 97140 MANUAL THERAPY 1/> REGIONS: CPT | Mod: GO

## 2018-02-16 PROCEDURE — 40000118 ZZH STATISTIC OT DEPT VISIT

## 2018-02-16 PROCEDURE — G8988 SELF CARE GOAL STATUS: HCPCS | Mod: GO,CI

## 2018-02-16 PROCEDURE — G8987 SELF CARE CURRENT STATUS: HCPCS | Mod: GO,CI

## 2018-02-16 PROCEDURE — 97165 OT EVAL LOW COMPLEX 30 MIN: CPT | Mod: GO

## 2018-02-16 PROCEDURE — 97110 THERAPEUTIC EXERCISES: CPT | Mod: GO

## 2018-02-23 ENCOUNTER — HOSPITAL ENCOUNTER (OUTPATIENT)
Dept: OCCUPATIONAL THERAPY | Facility: HOSPITAL | Age: 83
Setting detail: THERAPIES SERIES
End: 2018-02-23
Attending: FAMILY MEDICINE
Payer: MEDICARE

## 2018-02-23 PROCEDURE — 97110 THERAPEUTIC EXERCISES: CPT | Mod: GO

## 2018-02-23 PROCEDURE — 97140 MANUAL THERAPY 1/> REGIONS: CPT | Mod: GO

## 2018-02-23 PROCEDURE — 40000118 ZZH STATISTIC OT DEPT VISIT

## 2018-02-28 DIAGNOSIS — R25.9 ABNORMAL INVOLUNTARY MOVEMENT: ICD-10-CM

## 2018-03-01 RX ORDER — CYCLOBENZAPRINE HCL 10 MG
TABLET ORAL
Qty: 60 TABLET | Refills: 0 | Status: SHIPPED | OUTPATIENT
Start: 2018-03-01 | End: 2018-04-02

## 2018-03-02 ENCOUNTER — HOSPITAL ENCOUNTER (OUTPATIENT)
Dept: OCCUPATIONAL THERAPY | Facility: HOSPITAL | Age: 83
Setting detail: THERAPIES SERIES
End: 2018-03-02
Attending: FAMILY MEDICINE
Payer: MEDICARE

## 2018-03-02 PROCEDURE — 40000118 ZZH STATISTIC OT DEPT VISIT

## 2018-03-02 PROCEDURE — 97140 MANUAL THERAPY 1/> REGIONS: CPT | Mod: GO

## 2018-03-02 PROCEDURE — G8988 SELF CARE GOAL STATUS: HCPCS | Mod: GO,CI

## 2018-03-02 PROCEDURE — G8989 SELF CARE D/C STATUS: HCPCS | Mod: GO,CI

## 2018-03-05 NOTE — PROGRESS NOTES
03/02/18 1101   Notes   Note Type Discharge Summary   Signing Clinician's Name / Credentials   Signing clinician's name / credentials Freya Jack, OTR/L, CLT   Providers   Referring Physician Devon Ball   Self Care   Self Care Goal,  (eval/re-eval, every progress note & discharge) CI: 1-19% impairment   Self Care Discharge Status,  (discharge) CI: 1-19% impairment   Discharge Self Care Modifier Rationale Quick Dash   General Information   Rxs Authorized 4   Rxs Used 3   Medical Diagnosis s/p right ring finger pollack fasciotomy   Orders Evaluate And Treat As Indicated   Insurance Medicare   Start Of Care Date 02/16/18   Beginning of Cert (Date Period) 02/16/18   End of Cert period date 03/30/18   Onset date of current episode/exacerbation 02/16/15   Surgical procedure fasciotomy   Date of surgery 02/05/18   Days Post Surgery 25   Subjective Measures   Subjective Pt treated [address].  Pt has noted a little numbness on the radial side of middle phalanx.  Otherwise he's feeling really good and using his hand in weightlifing exercises   Initial Pain level 1/10   Current Pain level 1/10   Functional Improvement Reported Leisure Activities   QuickDASH [Functional Disability Questionnaire; 0-100 (0=no dysfunction; 100=dysfunction)] Open Dash   Open Jar 1   Heavy Household Chores 1   Carry a shopping bag 1   Wash back 1   Cut food with knife 1   Recreational activities 1   Social activities 1   Work, daily activities 1   Pain 2   Tingling 2   Sleeping 1   QuickDASH Sum 13   QuickDASH Count 11   QuickDASH Disability/Symptom Score 4.55   Objective Measures   Objective Measures Strength   Edema   Location (anatomical) proximal phalanx ring finger   ROM   Location (anatomical) ring fingers   Motion Ext/Flex   ROM Comments MCP 0/84, improved 11 degrees flexion   PIP -12/101, improved 11 degrees extension, 30 degrees flexion  DIP  078, improved 4 degrees ext, 28 degrees flexion   Location Right   Strength    Location Bilat    right 65#  left 75#   Manual   Manual MEM   Skilled Interventions To Decrease Edema   Minutes of Treatment 10   MEM elbow;forearm;hand   Position Sitting   Time 10   Hygiene/Toileting   Current Functional Task Shaving   Previous Performance Level Independent   Current Performance Level (no difficulty)   Goal Target Task Hold razor and shave   Goal Target Performance Level No difficulty   Due Date 03/16/18   Assessment   Response to Therapy: Improvements ROM;Strength   Plan   Homework use scar pad and digisleeve, cont AROM   Plan d/c OT   Total Session Time   Timed Code Treatment Minutes 10   Total Treatment Time (sum of timed and untimed services) 20

## 2018-03-12 DIAGNOSIS — G47.09 OTHER INSOMNIA: Primary | ICD-10-CM

## 2018-03-12 RX ORDER — TEMAZEPAM 30 MG
30 CAPSULE ORAL
Qty: 15 CAPSULE | Refills: 5 | Status: SHIPPED | OUTPATIENT
Start: 2018-03-12 | End: 2019-03-25

## 2018-03-12 NOTE — TELEPHONE ENCOUNTER
Pt calls he is having [facility] and needs a short term Rx until he can get this filled.

## 2018-03-15 ENCOUNTER — TELEPHONE (OUTPATIENT)
Dept: FAMILY MEDICINE | Facility: OTHER | Age: 83
End: 2018-03-15

## 2018-03-15 NOTE — TELEPHONE ENCOUNTER
I called Luzma at [facility] they have not paid a claim on this medication in the last year and are unsure if this is related to work comp. I reviewed the pt's record and it appear he usually get gets [facility] (see 03/12/18 telephone call note). I advised Luzma since they have not paid for in the last year I suspected this was an error on the pharmacies part. She recalls they had requested an ABX to be approved in the past. I called [facility] and they state the pt paid for his Rx. I called the pt and he states this is not work comp and he [facility]. Luzma is aware if I find anything else out to prove it is work comp I will call back, otherwise she will assume it is a pharmacy error.

## 2018-03-15 NOTE — TELEPHONE ENCOUNTER
11:24 AM    Reason for Call: Phone Call    Description: Luzma from [employer] (work comp) called and stated they received a request for coverage of Temazepam. Wondering if this has to do with pt's work comp. She does not have any notes since 11/2017. Please call her back at [phone]    Was an appointment offered for this call? No  If yes : Appointment type              Date    Preferred method for responding to this message: Telephone Call  What is your phone number ?    If we cannot reach you directly, may we leave a detailed response at the number you provided? Yes    Can this message wait until your PCP/provider returns, if available today? Not applicable    Mary Grace Aleman

## 2018-03-23 ENCOUNTER — TRANSFERRED RECORDS (OUTPATIENT)
Dept: HEALTH INFORMATION MANAGEMENT | Facility: CLINIC | Age: 83
End: 2018-03-23

## 2018-03-27 ENCOUNTER — TRANSFERRED RECORDS (OUTPATIENT)
Dept: HEALTH INFORMATION MANAGEMENT | Facility: CLINIC | Age: 83
End: 2018-03-27

## 2018-03-27 LAB
ALT SERPL-CCNC: 21 U/L (ref 13–61)
AST SERPL-CCNC: 18 U/L (ref 15–37)
CREAT SERPL-MCNC: 1.7 MG/DL (ref 0.7–1.2)
POTASSIUM SERPL-SCNC: 4.4 MMOL/L (ref 3.5–5)
TSH SERPL-ACNC: 4.2 UIU/ML (ref 0.3–5)

## 2018-04-02 DIAGNOSIS — R25.9 ABNORMAL INVOLUNTARY MOVEMENT: ICD-10-CM

## 2018-04-02 RX ORDER — CYCLOBENZAPRINE HCL 10 MG
TABLET ORAL
Qty: 60 TABLET | Refills: 0 | Status: SHIPPED | OUTPATIENT
Start: 2018-04-02 | End: 2018-05-01

## 2018-04-02 NOTE — TELEPHONE ENCOUNTER
Flexeril  Last Written Prescription Date:  3/1/18  Last Fill Qty:  60, # Refills:  0  Last Office Visit:  1/26/18    PCP is Dr. Bernal.  Medication is pended.  Thank you.

## 2018-04-24 DIAGNOSIS — M54.40 BILATERAL LOW BACK PAIN WITH SCIATICA, SCIATICA LATERALITY UNSPECIFIED, UNSPECIFIED CHRONICITY: ICD-10-CM

## 2018-04-24 RX ORDER — TRAMADOL HYDROCHLORIDE 50 MG/1
50 TABLET ORAL EVERY 8 HOURS PRN
Qty: 90 TABLET | Refills: 5 | Status: SHIPPED | OUTPATIENT
Start: 2018-04-24 | End: 2018-11-20

## 2018-04-24 NOTE — TELEPHONE ENCOUNTER
tramadol      Last Written Prescription Date:  8/18/17  Last Fill Quantity: 90,   # refills: 5  Last Office Visit: 1/26/18  Future Office visit:       Routing refill request to provider for review/approval because:  Drug not on the [facility], [facility] or [facility] refill protocol or controlled substance

## 2018-05-01 DIAGNOSIS — R25.9 ABNORMAL INVOLUNTARY MOVEMENT: ICD-10-CM

## 2018-05-01 RX ORDER — CYCLOBENZAPRINE HCL 10 MG
TABLET ORAL
Qty: 60 TABLET | Refills: 0 | Status: SHIPPED | OUTPATIENT
Start: 2018-05-01 | End: 2018-05-30

## 2018-05-01 NOTE — TELEPHONE ENCOUNTER
cyclobenzaprine (FLEXERIL) 10 MG tablet      Last Written Prescription Date:  04/02/2018  Last Fill Quantity: 60,   # refills: 0  Last Office Visit: 01/26/2018  Future Office visit:       Routing refill request to provider for review/approval because:

## 2018-05-10 ENCOUNTER — OFFICE VISIT (OUTPATIENT)
Dept: FAMILY MEDICINE | Facility: OTHER | Age: 83
End: 2018-05-10
Attending: FAMILY MEDICINE
Payer: MEDICARE

## 2018-05-10 VITALS
DIASTOLIC BLOOD PRESSURE: 64 MMHG | TEMPERATURE: 98.3 F | OXYGEN SATURATION: 93 % | WEIGHT: 203 LBS | HEART RATE: 84 BPM | BODY MASS INDEX: 32.27 KG/M2 | RESPIRATION RATE: 20 BRPM | SYSTOLIC BLOOD PRESSURE: 112 MMHG

## 2018-05-10 DIAGNOSIS — I10 BENIGN ESSENTIAL HYPERTENSION: ICD-10-CM

## 2018-05-10 DIAGNOSIS — N28.9 RENAL INSUFFICIENCY: ICD-10-CM

## 2018-05-10 DIAGNOSIS — L98.9 SKIN LESION: Primary | ICD-10-CM

## 2018-05-10 PROCEDURE — 36415 COLL VENOUS BLD VENIPUNCTURE: CPT | Mod: ZL | Performed by: FAMILY MEDICINE

## 2018-05-10 PROCEDURE — 80048 BASIC METABOLIC PNL TOTAL CA: CPT | Mod: ZL | Performed by: FAMILY MEDICINE

## 2018-05-10 PROCEDURE — 99214 OFFICE O/P EST MOD 30 MIN: CPT | Mod: 25 | Performed by: FAMILY MEDICINE

## 2018-05-10 PROCEDURE — G0463 HOSPITAL OUTPT CLINIC VISIT: HCPCS | Mod: 25

## 2018-05-10 PROCEDURE — 17110 DESTRUCTION B9 LES UP TO 14: CPT | Performed by: FAMILY MEDICINE

## 2018-05-10 PROCEDURE — G0463 HOSPITAL OUTPT CLINIC VISIT: HCPCS

## 2018-05-10 ASSESSMENT — ANXIETY QUESTIONNAIRES
6. BECOMING EASILY ANNOYED OR IRRITABLE: NOT AT ALL
3. WORRYING TOO MUCH ABOUT DIFFERENT THINGS: NOT AT ALL
1. FEELING NERVOUS, ANXIOUS, OR ON EDGE: NOT AT ALL
7. FEELING AFRAID AS IF SOMETHING AWFUL MIGHT HAPPEN: NOT AT ALL
5. BEING SO RESTLESS THAT IT IS HARD TO SIT STILL: NOT AT ALL
2. NOT BEING ABLE TO STOP OR CONTROL WORRYING: NOT AT ALL
GAD7 TOTAL SCORE: 0

## 2018-05-10 ASSESSMENT — PATIENT HEALTH QUESTIONNAIRE - PHQ9: 5. POOR APPETITE OR OVEREATING: NOT AT ALL

## 2018-05-10 ASSESSMENT — PAIN SCALES - GENERAL: PAINLEVEL: MILD PAIN (2)

## 2018-05-10 NOTE — NURSING NOTE
"Chief Complaint   Patient presents with     Derm Problem     skin lesion     other     discuss medications       Initial /64 (BP Location: Left arm, Patient Position: Sitting, Cuff Size: Adult Regular)  Pulse 84  Temp 98.3  F (36.8  C) (Tympanic)  Resp 20  Wt 203 lb (92.1 kg)  SpO2 93%  BMI 32.27 kg/m2 Estimated body mass index is 32.27 kg/(m^2) as calculated from the following:    Height as of 1/26/18: 5' 6.5\" (1.689 m).    Weight as of this encounter: 203 lb (92.1 kg).  Medication Reconciliation: complete    CECILIO GREER LPN    "

## 2018-05-10 NOTE — PROGRESS NOTES
SUBJECTIVE:   Kenyon Rivera is a 83 year old male who presents to clinic today for the following health issues:      Skin lesion, tag on forehead      Duration: 1 month    Description (location/character/radiation): small raised areas on forehead, skin tag    Intensity:  mild    Accompanying signs and symptoms: none    History (similar episodes/previous evaluation): None    Precipitating or alleviating factors: None    Therapies tried and outcome: None       Discuss medications for BP    Problem list and histories reviewed & adjusted, as indicated.  Additional history: as documented    Patient Active Problem List   Diagnosis     Advanced care planning/counseling discussion     Hypertension, Benign     Hyperlipidemia     Insomnia     Lumbago     Hypertrophy of prostate without urinary obstruction     ACP (advance care planning)     Controlled substance agreement signed     Blood glucose elevated     Past Surgical History:   Procedure Laterality Date     APPENDECTOMY       cataract extraction and lens implantation  3/2007    LEFT     CHOLECYSTECTOMY       electric stimulator implant       excision of Dupuytren's contracture extending into ring finger  02/08/2008    Bilateral     inguinal herniography      LEFT     NEPHRECTOMY  1992    RIGHT > Renal Cancer     OPEN REDUCTION INTERNAL FIXATION ANKLE      RIGHT     penile implant       TURP         Social History   Substance Use Topics     Smoking status: Former Smoker     Types: Cigarettes     Quit date: 1/30/1966     Smokeless tobacco: Never Used      Comment: Tried to Quit (YES)     Alcohol use Yes      Comment: RARELY     Family History   Problem Relation Age of Onset     DIABETES Brother      C.A.D. Mother      Myocardial Infarction Mother      HEART DISEASE Mother      Heart Disease     CANCER Sister      Leukemia     Other - See Comments Other      Colitis         Current Outpatient Prescriptions   Medication Sig Dispense Refill     acetaminophen (TYLENOL)  500 MG tablet Take 500-1,000 mg by mouth every 6 hours as needed       albuterol (PROAIR HFA, PROVENTIL HFA, VENTOLIN HFA) 108 (90 BASE) MCG/ACT inhaler Inhale 2 puffs into the lungs every 4 hours as needed for shortness of breath / dyspnea or wheezing       aspirin 81 MG tablet Take 81 mg by mouth daily        calcium carbonate (TUMS) 500 MG chewable tablet Take 1 chew tab by mouth every 4 hours as needed for heartburn       cetirizine (ZYRTEC) 10 MG tablet Take 10 mg by mouth daily as needed for allergies       Cholecalciferol (VITAMIN D3) 2000 UNITS CAPS Take by mouth daily       cyclobenzaprine (FLEXERIL) 10 MG tablet TAKE 1 TABLET(10 MG) BY MOUTH TWICE DAILY 60 tablet 0     dextromethorphan-guaiFENesin (MUCINEX DM)  MG per 12 hr tablet Take 1 tablet by mouth every 12 hours Patient takes at night       diclofenac (VOLTAREN) 1 % GEL Apply 2 g topically every 6 hours as needed #2 tubes written by Dr Berumen and 2 refills.       IBUPROFEN PO Take 200 mg by mouth every 6 hours as needed for moderate pain       losartan (COZAAR) 50 MG tablet Take 25 mg by mouth daily        Omega-3 Fatty Acids (FISH OIL) 1200 MG CAPS Take by mouth daily Fish oil 1290 omega 3 900       omeprazole 20 MG tablet Take 1 tablet by mouth 2 times daily.       order for DME Elastic Back brace with stays and velcro enclosure 1 each 0     order for DME Tens unit. 1 each 1     order for DME Tens unit and supplies 1 each 11     senna-docusate (SENOKOT-S;PERICOLACE) 8.6-50 MG per tablet Take 1 tablet by mouth daily       SIMETHICONE-80 PO Take 80 mg by mouth every 4 hours as needed for intestinal gas       simvastatin (ZOCOR) 20 MG tablet Take 1 tablet by mouth daily.       tamsulosin (FLOMAX) 0.4 MG 24 hr capsule Take 1 capsule by mouth At Bedtime.       temazepam (RESTORIL) 30 MG capsule Take 1 capsule (30 mg) by mouth nightly as needed 15 capsule 5     traMADol (ULTRAM) 50 MG tablet Take 1 tablet (50 mg) by mouth every 8 hours as needed for  pain 90 tablet 5     Allergies   Allergen Reactions     Chlorzoxazone      Parafon Forte       Reviewed and updated as needed this visit by clinical staff  Tobacco  Allergies  Meds  Med Hx  Surg Hx  Fam Hx  Soc Hx      Reviewed and updated as needed this visit by Provider         ROS:  Constitutional, HEENT, cardiovascular, pulmonary, gi and gu systems are negative, except as otherwise noted.    OBJECTIVE:                                                    /64 (BP Location: Left arm, Patient Position: Sitting, Cuff Size: Adult Regular)  Pulse 84  Temp 98.3  F (36.8  C) (Tympanic)  Resp 20  Wt 203 lb (92.1 kg)  SpO2 93%  BMI 32.27 kg/m2  Body mass index is 32.27 kg/(m^2).  GENERAL APPEARANCE: Alert, no acute distress  CV: regular rate and rhythm, no murmur, rub or gallop  RESP: lungs clear to auscultation bilaterally  ABDOMEN: normal bowel sounds, soft, nontender, no hepatosplenomegaly or other masses  SKIN: 5 keratotic lesions on forehead cryo to all done.    NEURO: Alert, oriented x 3, speech and mentation normal      Reviewed va labs.  Creat 1.7.  His losartan was reduced at that point.       ASSESSMENT/PLAN:                                                    1. Skin lesion  Cryo today.  F/u with ongoing concerns.      2. Benign essential hypertension  Stable.  He is on less losartan.  bp stable.  Update bmp.   - Basic metabolic panel  - DESTRUCT BENIGN LESION, UP TO 14    3. Renal insufficiency  As above.  Discussed at some length.   - Basic metabolic panel  - DESTRUCT BENIGN LESION, UP TO 14      Procedure:  Cryo x 3 to forehead lesions x 5 without complication.  Well tolerated without issues.      Eric Bernal MD  [facility]

## 2018-05-10 NOTE — MR AVS SNAPSHOT
"              After Visit Summary   5/10/2018    Kenyon Rivera    MRN: [id number]           Patient Information     Date Of Birth          1934        Visit Information        Provider Department      5/10/2018 4:00 PM Eric Bernal MD [facility]3]        Today's Diagnoses     Skin lesion    -  1    Benign essential hypertension        Renal insufficiency          Care Instructions    F/u with ongoing concerns.             Follow-ups after your visit        Who to contact     If you have questions or need follow up information about today's clinic visit or your schedule please contact [facility] directly at [phone].  Normal or non-critical lab and imaging results will be communicated to you by [employer], letter or phone within 4 business days after the clinic has received the results. If you do not hear from us within 7 days, please contact the clinic through [employer] or phone. If you have a critical or abnormal lab result, we will notify you by phone as soon as possible.  Submit refill requests through [employer] or call your pharmacy and they will forward the refill request to us. Please allow 3 business days for your refill to be completed.          Additional Information About Your Visit        MyChart Information     [employer] lets you send messages to your doctor, view your test results, renew your prescriptions, schedule appointments and more. To sign up, go to [email or web address] . Click on \"Log in\" on the left side of the screen, which will take you to the Welcome page. Then click on \"Sign up Now\" on the right side of the page.     You will be asked to enter the access code listed below, as well as some personal information. Please follow the directions to create your username and password.     Your access code is: [email or web address]  Expires: 2018  4:39 PM     Your access code will  in 90 days. If you need help or a new code, please call your [facility] or " [phone].        Care EveryWhere ID     This is your Care EveryWhere ID. This could be used by other organizations to access your [facility] medical records  [id number]        Your Vitals Were     Pulse Temperature Respirations Pulse Oximetry BMI (Body Mass Index)       84 98.3  F (36.8  C) (Tympanic) 20 93% 32.27 kg/m2        Blood Pressure from Last 3 Encounters:   05/10/18 112/64   01/26/18 132/70   12/12/17 128/66    Weight from Last 3 Encounters:   05/10/18 203 lb (92.1 kg)   01/26/18 200 lb (90.7 kg)   12/12/17 195 lb (88.5 kg)              We Performed the Following     Basic metabolic panel     DESTRUCT BENIGN LESION, UP TO 14        Primary Care Provider Office Phone # Fax #    Eric Bernal [phone]       [address] [place] [address]        Equal Access to Services     ERUM HOUSE : Hadii aad ku hadasho Soomaali, waaxda luqadaha, qaybta kaalmada adeegyada, waxay dennisein haycandyn hernán day . So [facility] [phone].    ATENCIÓN: Si habla español, tiene a estrella disposición servicios gratuitos de asistencia lingüística. Llame al [phone].    We comply with applicable federal civil rights laws and [place] laws. We do not discriminate on the basis of race, color, national origin, age, disability, sex, sexual orientation, or gender identity.            Thank you!     Thank you for choosing [facility]  for your care. Our goal is always to provide you with excellent care. Hearing back from our patients is one way we can continue to improve our services. Please take a few minutes to complete the written survey that you may receive in the mail after your visit with us. Thank you!             Your Updated Medication List - Protect others around you: Learn how to safely use, store and throw away your medicines at www.disposemymeds.org.          This list is accurate as of 5/10/18  4:39 PM.  Always use your most recent med list.                   Brand Name  Dispense Instructions for use Diagnosis    acetaminophen 500 MG tablet    TYLENOL     Take 500-1,000 mg by mouth every 6 hours as needed        albuterol 108 (90 Base) MCG/ACT Inhaler    PROAIR HFA/PROVENTIL HFA/VENTOLIN HFA     Inhale 2 puffs into the lungs every 4 hours as needed for shortness of breath / dyspnea or wheezing        aspirin 81 MG tablet      Take 81 mg by mouth daily        calcium carbonate 500 MG chewable tablet    TUMS     Take 1 chew tab by mouth every 4 hours as needed for heartburn        cetirizine 10 MG tablet    zyrTEC     Take 10 mg by mouth daily as needed for allergies        cyclobenzaprine 10 MG tablet    FLEXERIL    60 tablet    TAKE 1 TABLET(10 MG) BY MOUTH TWICE DAILY    Abnormal involuntary movement       dextromethorphan-guaiFENesin  MG per 12 hr tablet    MUCINEX DM     Take 1 tablet by mouth every 12 hours Patient takes at night        fish Oil 1200 MG capsule      Take by mouth daily Fish oil 1290 omega 3 900        FLOMAX 0.4 MG capsule   Generic drug:  tamsulosin      Take 1 capsule by mouth At Bedtime.        IBUPROFEN PO      Take 200 mg by mouth every 6 hours as needed for moderate pain        losartan 50 MG tablet    COZAAR     Take 25 mg by mouth daily        omeprazole 20 MG tablet      Take 1 tablet by mouth 2 times daily.        * order for DME     1 each    Elastic Back brace with stays and velcro enclosure    Chronic midline low back pain without sciatica       * order for DME     1 each    Tens unit.    Lumbar paraspinal muscle spasm       * order for DME     1 each    Tens unit and supplies    Chronic bilateral low back pain with sciatica, sciatica laterality unspecified       senna-docusate 8.6-50 MG per tablet    SENOKOT-S;PERICOLACE     Take 1 tablet by mouth daily        SIMETHICONE-80 PO      Take 80 mg by mouth every 4 hours as needed for intestinal gas        temazepam 30 MG capsule    RESTORIL    15 capsule    Take 1 capsule (30 mg) by mouth nightly  as needed    Other insomnia       traMADol 50 MG tablet    ULTRAM    90 tablet    Take 1 tablet (50 mg) by mouth every 8 hours as needed for pain    Bilateral low back pain with sciatica, sciatica laterality unspecified, unspecified chronicity       vitamin D3 2000 units Caps      Take by mouth daily        VOLTAREN 1 % Gel topical gel   Generic drug:  diclofenac      Apply 2 g topically every 6 hours as needed #2 tubes written by Dr Berumen and 2 refills.        ZOCOR 20 MG tablet   Generic drug:  simvastatin      Take 1 tablet by mouth daily.        * Notice:  This list has 3 medication(s) that are the same as other medications prescribed for you. Read the directions carefully, and ask your doctor or other care provider to review them with you.

## 2018-05-11 LAB
ANION GAP SERPL CALCULATED.3IONS-SCNC: 8 MMOL/L (ref 3–14)
BUN SERPL-MCNC: 25 MG/DL (ref 7–30)
CALCIUM SERPL-MCNC: 8.8 MG/DL (ref 8.5–10.1)
CHLORIDE SERPL-SCNC: 104 MMOL/L (ref 94–109)
CO2 SERPL-SCNC: 26 MMOL/L (ref 20–32)
CREAT SERPL-MCNC: 1.53 MG/DL (ref 0.66–1.25)
GFR SERPL CREATININE-BSD FRML MDRD: 44 ML/MIN/1.7M2
GLUCOSE SERPL-MCNC: 92 MG/DL (ref 70–99)
POTASSIUM SERPL-SCNC: 4.9 MMOL/L (ref 3.4–5.3)
SODIUM SERPL-SCNC: 138 MMOL/L (ref 133–144)

## 2018-05-11 ASSESSMENT — ANXIETY QUESTIONNAIRES: GAD7 TOTAL SCORE: 0

## 2018-05-11 ASSESSMENT — PATIENT HEALTH QUESTIONNAIRE - PHQ9: SUM OF ALL RESPONSES TO PHQ QUESTIONS 1-9: 0

## 2018-05-15 ENCOUNTER — OFFICE VISIT (OUTPATIENT)
Dept: CHIROPRACTIC MEDICINE | Facility: OTHER | Age: 83
End: 2018-05-15
Attending: CHIROPRACTOR
Payer: OTHER MISCELLANEOUS

## 2018-05-15 DIAGNOSIS — M54.50 ACUTE BILATERAL LOW BACK PAIN WITHOUT SCIATICA: ICD-10-CM

## 2018-05-15 DIAGNOSIS — M99.03 SEGMENTAL AND SOMATIC DYSFUNCTION OF LUMBAR REGION: Primary | ICD-10-CM

## 2018-05-15 DIAGNOSIS — M99.02 SEGMENTAL AND SOMATIC DYSFUNCTION OF THORACIC REGION: ICD-10-CM

## 2018-05-15 PROCEDURE — 98940 CHIROPRACT MANJ 1-2 REGIONS: CPT | Mod: AT | Performed by: CHIROPRACTOR

## 2018-05-15 NOTE — MR AVS SNAPSHOT
"              After Visit Summary   5/15/2018    Kenyon Rivera    MRN: [id number]           Patient Information     Date Of Birth          5/31/1934        Visit Information        Provider Department      5/15/2018 10:30 AM Kenyon Syed DC  [facility]        Today's Diagnoses     Segmental and somatic dysfunction of lumbar region    -  1    Acute bilateral low back pain without sciatica        Segmental and somatic dysfunction of thoracic region           Follow-ups after your visit        Your next 10 appointments already scheduled     Aug 14, 2018 10:15 AM CDT   (Arrive by 10:00 AM)   SHORT with Eric Bernal MD   [facility] ([facility] )    [address] [facility] [address]   [phone]              Who to contact     If you have questions or need follow up information about today's clinic visit or your schedule please contact  [facility] directly at [phone].  Normal or non-critical lab and imaging results will be communicated to you by MyChart, letter or phone within 4 business days after the clinic has received the results. If you do not hear from us within 7 days, please contact the clinic through MyChart or phone. If you have a critical or abnormal lab result, we will notify you by phone as soon as possible.  Submit refill requests through [employer] or call your pharmacy and they will forward the refill request to us. Please allow 3 business days for your refill to be completed.          Additional Information About Your Visit        MyChart Information     [employer] lets you send messages to your doctor, view your test results, renew your prescriptions, schedule appointments and more. To sign up, go to [email or web address] . Click on \"Log in\" on the left side of the screen, which will take you to the Welcome page. Then click on \"Sign up Now\" on the right side of the page.     You will be asked to enter the access code listed " below, as well as some personal information. Please follow the directions to create your username and password.     Your access code is: [email or web address]  Expires: 2018  4:39 PM     Your access code will  in 90 days. If you need help or a new code, please call your [facility] clinic or [phone].        Care EveryWhere ID     This is your Care EveryWhere ID. This could be used by other organizations to access your [facility] medical records  [id number]         Blood Pressure from Last 3 Encounters:   05/10/18 112/64   18 132/70   17 128/66    Weight from Last 3 Encounters:   05/10/18 203 lb (92.1 kg)   18 200 lb (90.7 kg)   17 195 lb (88.5 kg)              We Performed the Following     CHIROPRAC MANIP,SPINAL,1-2 REGIONS        Primary Care Provider Office Phone # Fax #    Eric Bernal [phone]       [address] [place] [address]        Equal Access to Services     [facility]: Hadii aad ku hadasho Soomaali, waaxda luqadaha, qaybta kaalmada ademarie, burton day . So [facility] [phone].    ATENCIÓN: Si habla español, tiene a estrella disposición servicios gratuitos de asistencia lingüística. Zachary al [phone].    We comply with applicable federal civil rights laws and [place] laws. We do not discriminate on the basis of race, color, national origin, age, disability, sex, sexual orientation, or gender identity.            Thank you!     Thank you for choosing  [facility]  for your care. Our goal is always to provide you with excellent care. Hearing back from our patients is one way we can continue to improve our services. Please take a few minutes to complete the written survey that you may receive in the mail after your visit with us. Thank you!             Your Updated Medication List - Protect others around you: Learn how to safely use, store and throw away your medicines at www.disposemymeds.org.          This  list is accurate as of 5/15/18 11:01 AM.  Always use your most recent med list.                   Brand Name Dispense Instructions for use Diagnosis    acetaminophen 500 MG tablet    TYLENOL     Take 500-1,000 mg by mouth every 6 hours as needed        albuterol 108 (90 Base) MCG/ACT Inhaler    PROAIR HFA/PROVENTIL HFA/VENTOLIN HFA     Inhale 2 puffs into the lungs every 4 hours as needed for shortness of breath / dyspnea or wheezing        aspirin 81 MG tablet      Take 81 mg by mouth daily        calcium carbonate 500 MG chewable tablet    TUMS     Take 1 chew tab by mouth every 4 hours as needed for heartburn        cetirizine 10 MG tablet    zyrTEC     Take 10 mg by mouth daily as needed for allergies        cyclobenzaprine 10 MG tablet    FLEXERIL    60 tablet    TAKE 1 TABLET(10 MG) BY MOUTH TWICE DAILY    Abnormal involuntary movement       dextromethorphan-guaiFENesin  MG per 12 hr tablet    MUCINEX DM     Take 1 tablet by mouth every 12 hours Patient takes at night        fish Oil 1200 MG capsule      Take by mouth daily Fish oil 1290 omega 3 900        FLOMAX 0.4 MG capsule   Generic drug:  tamsulosin      Take 1 capsule by mouth At Bedtime.        IBUPROFEN PO      Take 200 mg by mouth every 6 hours as needed for moderate pain        losartan 50 MG tablet    COZAAR     Take 25 mg by mouth daily        omeprazole 20 MG tablet      Take 1 tablet by mouth 2 times daily.        * order for DME     1 each    Elastic Back brace with stays and velcro enclosure    Chronic midline low back pain without sciatica       * order for DME     1 each    Tens unit.    Lumbar paraspinal muscle spasm       * order for DME     1 each    Tens unit and supplies    Chronic bilateral low back pain with sciatica, sciatica laterality unspecified       senna-docusate 8.6-50 MG per tablet    SENOKOT-S;PERICOLACE     Take 1 tablet by mouth daily        SIMETHICONE-80 PO      Take 80 mg by mouth every 4 hours as needed for  intestinal gas        temazepam 30 MG capsule    RESTORIL    15 capsule    Take 1 capsule (30 mg) by mouth nightly as needed    Other insomnia       traMADol 50 MG tablet    ULTRAM    90 tablet    Take 1 tablet (50 mg) by mouth every 8 hours as needed for pain    Bilateral low back pain with sciatica, sciatica laterality unspecified, unspecified chronicity       vitamin D3 2000 units Caps      Take by mouth daily        VOLTAREN 1 % Gel topical gel   Generic drug:  diclofenac      Apply 2 g topically every 6 hours as needed #2 tubes written by Dr Berumen and 2 refills.        ZOCOR 20 MG tablet   Generic drug:  simvastatin      Take 1 tablet by mouth daily.        * Notice:  This list has 3 medication(s) that are the same as other medications prescribed for you. Read the directions carefully, and ask your doctor or other care provider to review them with you.

## 2018-05-30 DIAGNOSIS — R25.9 ABNORMAL INVOLUNTARY MOVEMENT: ICD-10-CM

## 2018-05-30 RX ORDER — CYCLOBENZAPRINE HCL 10 MG
TABLET ORAL
Qty: 60 TABLET | Refills: 0 | Status: SHIPPED | OUTPATIENT
Start: 2018-05-30 | End: 2018-06-29

## 2018-05-30 NOTE — TELEPHONE ENCOUNTER
flexeril      Last Written Prescription Date:  5/1/18  Last Fill Quantity: 60,   # refills: 0  Last Office Visit: 5/10/18  Future Office visit:    Next 5 appointments (look out 90 days)     Aug 14, 2018 10:15 AM CDT   (Arrive by 10:00 AM)   SHORT with Eric Bernal MD   [facility] ([facility] )    [address] [facility] [address]   [phone]                   Routing refill request to provider for review/approval because:  Drug not on the [facility], [facility] or [facility] refill protocol or controlled substance

## 2018-06-25 ENCOUNTER — OFFICE VISIT (OUTPATIENT)
Dept: CHIROPRACTIC MEDICINE | Facility: OTHER | Age: 83
End: 2018-06-25
Attending: CHIROPRACTOR
Payer: OTHER MISCELLANEOUS

## 2018-06-25 DIAGNOSIS — M54.50 ACUTE BILATERAL LOW BACK PAIN WITHOUT SCIATICA: ICD-10-CM

## 2018-06-25 DIAGNOSIS — M99.02 SEGMENTAL AND SOMATIC DYSFUNCTION OF THORACIC REGION: ICD-10-CM

## 2018-06-25 DIAGNOSIS — M99.03 SEGMENTAL AND SOMATIC DYSFUNCTION OF LUMBAR REGION: Primary | ICD-10-CM

## 2018-06-25 PROCEDURE — 98940 CHIROPRACT MANJ 1-2 REGIONS: CPT | Mod: AT | Performed by: CHIROPRACTOR

## 2018-06-25 NOTE — MR AVS SNAPSHOT
After Visit Summary   6/25/2018    Kenyon Rivera    MRN: [id number]           Patient Information     Date Of Birth          5/31/1934        Visit Information        Provider Department      6/25/2018 10:50 AM Kenyon Syed DC  [facility]        Today's Diagnoses     Segmental and somatic dysfunction of lumbar region    -  1    Acute bilateral low back pain without sciatica        Segmental and somatic dysfunction of thoracic region           Follow-ups after your visit        Your next 10 appointments already scheduled     Aug 14, 2018 10:15 AM CDT   (Arrive by 10:00 AM)   SHORT with Eric Bernal MD   [facility] ([facility] )    [address] [facility] [address]   [phone]              Who to contact     If you have questions or need follow up information about today's clinic visit or your schedule please contact  [facility] directly at [phone].  Normal or non-critical lab and imaging results will be communicated to you by MyChart, letter or phone within 4 business days after the clinic has received the results. If you do not hear from us within 7 days, please contact the clinic through MyChart or phone. If you have a critical or abnormal lab result, we will notify you by phone as soon as possible.  Submit refill requests through [employer] or call your pharmacy and they will forward the refill request to us. Please allow 3 business days for your refill to be completed.          Additional Information About Your Visit        Care EveryWhere ID     This is your Care EveryWhere ID. This could be used by other organizations to access your [facility] medical records  [id number]         Blood Pressure from Last 3 Encounters:   05/10/18 112/64   01/26/18 132/70   12/12/17 128/66    Weight from Last 3 Encounters:   05/10/18 203 lb (92.1 kg)   01/26/18 200 lb (90.7 kg)   12/12/17 195 lb (88.5 kg)              We Performed the  Following     CHIROPRAC MANIP,SPINAL,1-2 REGIONS        Primary Care Provider Office Phone # Fax #    Eric Bernal [phone]       [address] [place] [address] E  [facility] [address]        Equal Access to Services     ER[employer] HOUSE : Hadthu svetlana ku anithao Sobeenaali, waaxda luqadaha, qaybta kaalmada ademarie, burton ye laGraciahawk islas. So [facility] [phone].    ATENCIÓN: Si habla español, tiene a estrella disposición servicios gratuitos de asistencia lingüística. Llame al [phone].    We comply with applicable federal civil rights laws and [place] laws. We do not discriminate on the basis of race, color, national origin, age, disability, sex, sexual orientation, or gender identity.            Thank you!     Thank you for choosing  [facility]  for your care. Our goal is always to provide you with excellent care. Hearing back from our patients is one way we can continue to improve our services. Please take a few minutes to complete the written survey that you may receive in the mail after your visit with us. Thank you!             Your Updated Medication List - Protect others around you: Learn how to safely use, store and throw away your medicines at www.disposemymeds.org.          This list is accurate as of 6/25/18 11:59 PM.  Always use your most recent med list.                   Brand Name Dispense Instructions for use Diagnosis    acetaminophen 500 MG tablet    TYLENOL     Take 500-1,000 mg by mouth every 6 hours as needed        albuterol 108 (90 Base) MCG/ACT Inhaler    PROAIR HFA/PROVENTIL HFA/VENTOLIN HFA     Inhale 2 puffs into the lungs every 4 hours as needed for shortness of breath / dyspnea or wheezing        aspirin 81 MG tablet      Take 81 mg by mouth daily        calcium carbonate 500 MG chewable tablet    TUMS     Take 1 chew tab by mouth every 4 hours as needed for heartburn        cetirizine 10 MG tablet    zyrTEC     Take 10 mg by mouth daily as needed for  allergies        cyclobenzaprine 10 MG tablet    FLEXERIL    60 tablet    TAKE 1 TABLET(10 MG) BY MOUTH TWICE DAILY    Abnormal involuntary movement       dextromethorphan-guaiFENesin  MG per 12 hr tablet    MUCINEX DM     Take 1 tablet by mouth every 12 hours Patient takes at night        fish Oil 1200 MG capsule      Take by mouth daily Fish oil 1290 omega 3 900        FLOMAX 0.4 MG capsule   Generic drug:  tamsulosin      Take 1 capsule by mouth At Bedtime.        IBUPROFEN PO      Take 200 mg by mouth every 6 hours as needed for moderate pain        losartan 50 MG tablet    COZAAR     Take 25 mg by mouth daily        omeprazole 20 MG tablet      Take 1 tablet by mouth 2 times daily.        * order for DME     1 each    Elastic Back brace with stays and velcro enclosure    Chronic midline low back pain without sciatica       * order for DME     1 each    Tens unit.    Lumbar paraspinal muscle spasm       * order for DME     1 each    Tens unit and supplies    Chronic bilateral low back pain with sciatica, sciatica laterality unspecified       senna-docusate 8.6-50 MG per tablet    SENOKOT-S;PERICOLACE     Take 1 tablet by mouth daily        SIMETHICONE-80 PO      Take 80 mg by mouth every 4 hours as needed for intestinal gas        temazepam 30 MG capsule    RESTORIL    15 capsule    Take 1 capsule (30 mg) by mouth nightly as needed    Other insomnia       traMADol 50 MG tablet    ULTRAM    90 tablet    Take 1 tablet (50 mg) by mouth every 8 hours as needed for pain    Bilateral low back pain with sciatica, sciatica laterality unspecified, unspecified chronicity       vitamin D3 2000 units Caps      Take by mouth daily        VOLTAREN 1 % Gel topical gel   Generic drug:  diclofenac      Apply 2 g topically every 6 hours as needed #2 tubes written by Dr Berumen and 2 refills.        ZOCOR 20 MG tablet   Generic drug:  simvastatin      Take 1 tablet by mouth daily.        * Notice:  This list has 3  medication(s) that are the same as other medications prescribed for you. Read the directions carefully, and ask your doctor or other care provider to review them with you.

## 2018-06-26 NOTE — PROGRESS NOTES
Subjective Finding:    Chief compalint: Patient presents with:  Back Pain: stiffness with gardening  , Pain Scale: 5/10, Intensity: dull and ache, Duration: 2 weeks, Change since last visit: , Radiating: bilateral buttock.    Date of injury:     Activities that the pain restricts:   Home/household/hobbies/social activities: yes.  Work duties: yes.  Sleep: no.  Makes symptoms better: rest.  Makes symptoms worse: activity, lumbar extension and lumbar flexion.  Have you seen anyone else for the symptoms? no.  Work related: no  Automobile related injury: no.    Objective and Assessment:    Posture Analysis:   High shoulder: right.  Head tilt: right.  High iliac crest: right.  Head carriage: neutral.  Thoracic Kyphosis: neutral.  Lumbar Lordosis: forward.    Lumbar Range of Motion: flexion decreased, extension decreased, left lateral flexion decreased and right lateral flexion decreased.  Cervical Range of Motion: .  Thoracic Range of Motion: .  Extremity Range of Motion: .    Palpation:   Quad lumb: bilateral, referred pain: no    Segmental dysfunction pre-treatment: T10  L4-5  SI.     Assessment post-treatment:  Cervical: ROM increased.  Thoracic: ROM increased.  Lumbar: ROM increased, pain and tenderness decreased and muscle spasm decreased.    Comments: .      Complicating Factors: .    Plan / Procedure:    Expected release date: .  Treatment plan: 1 times per month.  Instructed patient: ice 20 minutes every other hour as needed and rest.  Short term goals: reduce pain.  Long term goals: restore normal function.  Prognosis: excellent.

## 2018-06-29 DIAGNOSIS — R25.9 ABNORMAL INVOLUNTARY MOVEMENT: ICD-10-CM

## 2018-06-29 RX ORDER — CYCLOBENZAPRINE HCL 10 MG
TABLET ORAL
Qty: 60 TABLET | Refills: 0 | Status: SHIPPED | OUTPATIENT
Start: 2018-06-29 | End: 2018-08-01

## 2018-06-29 NOTE — TELEPHONE ENCOUNTER
flexeril      Last Written Prescription Date:  5/30/18  Last Fill Quantity: 60,   # refills: 0  Last Office Visit: 1/26/18  Future Office visit:    Next 5 appointments (look out 90 days)     Aug 14, 2018 10:15 AM CDT   (Arrive by 10:00 AM)   SHORT with Eric Bernal MD   [facility] ([facility] )    [address] [facility] [address]   [phone]                   Routing refill request to provider for review/approval because:  Drug not on the [facility], [facility] or [facility] refill protocol or controlled substance

## 2018-07-10 NOTE — PROGRESS NOTES
Outpatient Physical Therapy Discharge Note     Patient: Kenyon Rivera  : 1934    Beginning/End Dates of Reporting Period:  2017 to 7/10/2018    Referring Provider: Dr. Eric Bernal    Therapy Diagnosis:Patient presents with recent exacerbation of chronic LBP with original injury occuring in  with tree falling on patient. Patient has increased symptoms and decreased activity level the past several weeks       Client Self Report: Pt reports he is doing well.  States he did have increased pain last night however states he did not need to use TENS for pain relief, was able to manage without.     Objective Measurements:                                          Outcome Measures (most recent score):      Goals:  Goal Identifier LTG 1   Goal Description Patient will be able to return to prior activity level including transfers, community mobility without limitation from pain   Target Date 18   Date Met  18   Progress:     Goal Identifier LTG 2   Goal Description Pt will be able to sleep throughout the night without waking due to pain with decreased use of narcotics.   Target Date 18   Date Met      Progress:     Goal Identifier     Goal Description     Target Date     Date Met      Progress:     Goal Identifier     Goal Description     Target Date     Date Met      Progress:     Goal Identifier     Goal Description     Target Date     Date Met      Progress:     Goal Identifier     Goal Description     Target Date     Date Met      Progress:     Goal Identifier     Goal Description     Target Date     Date Met      Progress:     Goal Identifier     Goal Description     Target Date     Date Met      Progress:     Progress Toward Goals:   Progress this reporting period: Pt has made good progress and feels he has returned to his baseline chronic pain level.            Plan:  Discharge from therapy.    Discharge:    Reason for Discharge: Patient has met all goals.    Equipment Issued:      Discharge Plan: Patient to continue home program.

## 2018-07-16 ENCOUNTER — OFFICE VISIT (OUTPATIENT)
Dept: CHIROPRACTIC MEDICINE | Facility: OTHER | Age: 83
End: 2018-07-16
Attending: CHIROPRACTOR
Payer: OTHER MISCELLANEOUS

## 2018-07-16 DIAGNOSIS — M99.02 SEGMENTAL AND SOMATIC DYSFUNCTION OF THORACIC REGION: ICD-10-CM

## 2018-07-16 DIAGNOSIS — M99.03 SEGMENTAL AND SOMATIC DYSFUNCTION OF LUMBAR REGION: Primary | ICD-10-CM

## 2018-07-16 DIAGNOSIS — M54.50 ACUTE BILATERAL LOW BACK PAIN WITHOUT SCIATICA: ICD-10-CM

## 2018-07-16 PROCEDURE — 98940 CHIROPRACT MANJ 1-2 REGIONS: CPT | Mod: AT | Performed by: CHIROPRACTOR

## 2018-07-16 NOTE — MR AVS SNAPSHOT
After Visit Summary   7/16/2018    Kenyon Rivera    MRN: [id number]           Patient Information     Date Of Birth          5/31/1934        Visit Information        Provider Department      7/16/2018 2:10 PM Kenyon Syed DC  [facility]        Today's Diagnoses     Segmental and somatic dysfunction of lumbar region    -  1    Acute bilateral low back pain without sciatica        Segmental and somatic dysfunction of thoracic region           Follow-ups after your visit        Your next 10 appointments already scheduled     Aug 06, 2018  3:30 PM CDT   (Arrive by 3:15 PM)   SHORT with Eric Bernal MD   [facility] ([facility] )    [address]  [facility] [address]   [phone]            Aug 14, 2018 10:15 AM CDT   (Arrive by 10:00 AM)   SHORT with Eric Bernal MD   [facility] ([facility] )    [address]  [facility] [address]   [phone]              Who to contact     If you have questions or need follow up information about today's clinic visit or your schedule please contact  [facility] directly at [phone].  Normal or non-critical lab and imaging results will be communicated to you by MyChart, letter or phone within 4 business days after the clinic has received the results. If you do not hear from us within 7 days, please contact the clinic through MyChart or phone. If you have a critical or abnormal lab result, we will notify you by phone as soon as possible.  Submit refill requests through [employer] or call your pharmacy and they will forward the refill request to us. Please allow 3 business days for your refill to be completed.          Additional Information About Your Visit        Care EveryWhere ID     This is your Care EveryWhere ID. This could be used by other organizations to access your [place] medical records  [id number]         Blood Pressure from Last 3  Encounters:   05/10/18 112/64   01/26/18 132/70   12/12/17 128/66    Weight from Last 3 Encounters:   05/10/18 203 lb (92.1 kg)   01/26/18 200 lb (90.7 kg)   12/12/17 195 lb (88.5 kg)              We Performed the Following     CHIROPRAC MANIP,SPINAL,1-2 REGIONS        Primary Care Provider Office Phone # Fax #    Eric Bernal [phone]       [address] [place] [address]        Equal Access to Services     [facility]: Hadii aad ku hadasho Soomaali, waaxda luqadaha, qaybta kaalmada adeegyada, waxroxi day . So [facility] -[phone].    ATENCIÓN: Si habla español, tiene a estrella disposición servicios gratuitos de asistencia lingüística. [facility] [phone].    We comply with applicable federal civil rights laws and [place] laws. We do not discriminate on the basis of race, color, national origin, age, disability, sex, sexual orientation, or gender identity.            Thank you!     Thank you for choosing  [facility]  for your care. Our goal is always to provide you with excellent care. Hearing back from our patients is one way we can continue to improve our services. Please take a few minutes to complete the written survey that you may receive in the mail after your visit with us. Thank you!             Your Updated Medication List - Protect others around you: Learn how to safely use, store and throw away your medicines at www.disposemymeds.org.          This list is accurate as of 7/16/18  2:45 PM.  Always use your most recent med list.                   Brand Name Dispense Instructions for use Diagnosis    acetaminophen 500 MG tablet    TYLENOL     Take 500-1,000 mg by mouth every 6 hours as needed        albuterol 108 (90 Base) MCG/ACT Inhaler    PROAIR HFA/PROVENTIL HFA/VENTOLIN HFA     Inhale 2 puffs into the lungs every 4 hours as needed for shortness of breath / dyspnea or wheezing        aspirin 81 MG tablet      Take 81 mg by mouth daily         calcium carbonate 500 MG chewable tablet    TUMS     Take 1 chew tab by mouth every 4 hours as needed for heartburn        cetirizine 10 MG tablet    zyrTEC     Take 10 mg by mouth daily as needed for allergies        cyclobenzaprine 10 MG tablet    FLEXERIL    60 tablet    TAKE 1 TABLET(10 MG) BY MOUTH TWICE DAILY    Abnormal involuntary movement       dextromethorphan-guaiFENesin  MG per 12 hr tablet    MUCINEX DM     Take 1 tablet by mouth every 12 hours Patient takes at night        fish Oil 1200 MG capsule      Take by mouth daily Fish oil 1290 omega 3 900        FLOMAX 0.4 MG capsule   Generic drug:  tamsulosin      Take 1 capsule by mouth At Bedtime.        IBUPROFEN PO      Take 200 mg by mouth every 6 hours as needed for moderate pain        losartan 50 MG tablet    COZAAR     Take 25 mg by mouth daily        omeprazole 20 MG tablet      Take 1 tablet by mouth 2 times daily.        * order for DME     1 each    Elastic Back brace with stays and velcro enclosure    Chronic midline low back pain without sciatica       * order for DME     1 each    Tens unit.    Lumbar paraspinal muscle spasm       * order for DME     1 each    Tens unit and supplies    Chronic bilateral low back pain with sciatica, sciatica laterality unspecified       senna-docusate 8.6-50 MG per tablet    SENOKOT-S;PERICOLACE     Take 1 tablet by mouth daily        SIMETHICONE-80 PO      Take 80 mg by mouth every 4 hours as needed for intestinal gas        temazepam 30 MG capsule    RESTORIL    15 capsule    Take 1 capsule (30 mg) by mouth nightly as needed    Other insomnia       traMADol 50 MG tablet    ULTRAM    90 tablet    Take 1 tablet (50 mg) by mouth every 8 hours as needed for pain    Bilateral low back pain with sciatica, sciatica laterality unspecified, unspecified chronicity       vitamin D3 2000 units Caps      Take by mouth daily        VOLTAREN 1 % Gel topical gel   Generic drug:  diclofenac      Apply 2 g  topically every 6 hours as needed #2 tubes written by Dr Berumen and 2 refills.        ZOCOR 20 MG tablet   Generic drug:  simvastatin      Take 1 tablet by mouth daily.        * Notice:  This list has 3 medication(s) that are the same as other medications prescribed for you. Read the directions carefully, and ask your doctor or other care provider to review them with you.

## 2018-08-01 DIAGNOSIS — R25.9 ABNORMAL INVOLUNTARY MOVEMENT: ICD-10-CM

## 2018-08-01 RX ORDER — CYCLOBENZAPRINE HCL 10 MG
TABLET ORAL
Qty: 60 TABLET | Refills: 0 | Status: SHIPPED | OUTPATIENT
Start: 2018-08-01 | End: 2018-08-30

## 2018-08-01 NOTE — TELEPHONE ENCOUNTER
flexeril      Last Written Prescription Date:  6/29/18  Last Fill Quantity: 60,   # refills: 0  Last Office Visit: 5/10/18  Future Office visit:    Next 5 appointments (look out 90 days)     Aug 06, 2018  3:30 PM CDT   (Arrive by 3:15 PM)   SHORT with Eric Bernal MD   [facility] ([facility] [place] [facility] )    [address]  [facility] [address]   [phone]            Aug 14, 2018 10:15 AM CDT   (Arrive by 10:00 AM)   SHORT with Eric Bernal MD   [facility] ([facility] [place] [facility] )    [address]  [facility] [place] [address]   [phone]                   Routing refill request to provider for review/approval because:  Drug not on the [facility], [facility] or [facility] refill protocol or controlled substance

## 2018-08-03 NOTE — PROGRESS NOTES
"Occupational Visit     Subjective:  Kenyon Rivera, 84 year old, male is seen for low back injury.  The date of injury is 05/31/75.  The patient is employed was [employer].      Allergies   Allergen Reactions     Chlorzoxazone      Parafon Forte         Review of Systems:  Leg pain is diffuse.  Back pain doctor suggested statin cessation.        OBJECTIVE:  Vitals: /62  Pulse 93  Temp 96.8  F (36  C)  Ht 5' 6.5\" (1.689 m)  Wt 201 lb (91.2 kg)  SpO2 93%  BMI 31.96 kg/m2      Exam:  Diffuse leg pain to palpation.  Back pain noted with gait rising from chair slowly, hunched over with initiation of gait.       Labs:      ASSESSMENT/PLAN:    (M54.5,  G89.29) Chronic midline low back pain without sciatica  (primary encounter diagnosis)  Comment: ongoing  Plan: concern for the leg pains perhaps being the statin.  Hold for 2 months.  He will report back.     (M79.604,  M79.605) Bilateral leg pain  Comment: as above.   Plan: as above.       "

## 2018-08-06 ENCOUNTER — OFFICE VISIT (OUTPATIENT)
Dept: FAMILY MEDICINE | Facility: OTHER | Age: 83
End: 2018-08-06
Attending: FAMILY MEDICINE
Payer: OTHER MISCELLANEOUS

## 2018-08-06 VITALS
SYSTOLIC BLOOD PRESSURE: 122 MMHG | HEIGHT: 67 IN | HEART RATE: 93 BPM | DIASTOLIC BLOOD PRESSURE: 62 MMHG | OXYGEN SATURATION: 93 % | BODY MASS INDEX: 31.55 KG/M2 | TEMPERATURE: 96.8 F | WEIGHT: 201 LBS

## 2018-08-06 DIAGNOSIS — G89.29 CHRONIC MIDLINE LOW BACK PAIN WITHOUT SCIATICA: Primary | ICD-10-CM

## 2018-08-06 DIAGNOSIS — M54.50 CHRONIC MIDLINE LOW BACK PAIN WITHOUT SCIATICA: Primary | ICD-10-CM

## 2018-08-06 DIAGNOSIS — M79.605 BILATERAL LEG PAIN: ICD-10-CM

## 2018-08-06 DIAGNOSIS — M79.604 BILATERAL LEG PAIN: ICD-10-CM

## 2018-08-06 PROCEDURE — 99213 OFFICE O/P EST LOW 20 MIN: CPT | Performed by: FAMILY MEDICINE

## 2018-08-06 ASSESSMENT — ANXIETY QUESTIONNAIRES
3. WORRYING TOO MUCH ABOUT DIFFERENT THINGS: NOT AT ALL
2. NOT BEING ABLE TO STOP OR CONTROL WORRYING: NOT AT ALL
GAD7 TOTAL SCORE: 0
6. BECOMING EASILY ANNOYED OR IRRITABLE: NOT AT ALL
1. FEELING NERVOUS, ANXIOUS, OR ON EDGE: NOT AT ALL
5. BEING SO RESTLESS THAT IT IS HARD TO SIT STILL: NOT AT ALL
7. FEELING AFRAID AS IF SOMETHING AWFUL MIGHT HAPPEN: NOT AT ALL

## 2018-08-06 ASSESSMENT — PAIN SCALES - GENERAL: PAINLEVEL: MILD PAIN (3)

## 2018-08-06 ASSESSMENT — PATIENT HEALTH QUESTIONNAIRE - PHQ9: 5. POOR APPETITE OR OVEREATING: NOT AT ALL

## 2018-08-06 NOTE — NURSING NOTE
"Chief Complaint   Patient presents with     Work Comp       Initial /62  Pulse 93  Temp 96.8  F (36  C)  Ht 5' 6.5\" (1.689 m)  Wt 201 lb (91.2 kg)  SpO2 93%  BMI 31.96 kg/m2 Estimated body mass index is 31.96 kg/(m^2) as calculated from the following:    Height as of this encounter: 5' 6.5\" (1.689 m).    Weight as of this encounter: 201 lb (91.2 kg).  Medication Reconciliation: complete    Gladis Hernandez LPN  "

## 2018-08-06 NOTE — MR AVS SNAPSHOT
"              After Visit Summary   8/6/2018    Kenyon Rivera    MRN: [id number]           Patient Information     Date Of Birth          5/31/1934        Visit Information        Provider Department      8/6/2018 3:30 PM Eric Bernal MD [facility]        Today's Diagnoses     Chronic midline low back pain without sciatica    -  1    Bilateral leg pain          Care Instructions    F/u with ongoing concerns.             Follow-ups after your visit        Your next 10 appointments already scheduled     Aug 14, 2018 10:15 AM CDT   (Arrive by 10:00 AM)   SHORT with Eric Bernal MD   [facility] ([facility] )    [address]  [facility] [address]   [phone]              Who to contact     If you have questions or need follow up information about today's clinic visit or your schedule please contact [facility] directly at [phone].  Normal or non-critical lab and imaging results will be communicated to you by MyChart, letter or phone within 4 business days after the clinic has received the results. If you do not hear from us within 7 days, please contact the clinic through MyChart or phone. If you have a critical or abnormal lab result, we will notify you by phone as soon as possible.  Submit refill requests through [employer] or call your pharmacy and they will forward the refill request to us. Please allow 3 business days for your refill to be completed.          Additional Information About Your Visit        Care EveryWhere ID     This is your Care EveryWhere ID. This could be used by other organizations to access your [facility] medical records  [id number]        Your Vitals Were     Pulse Temperature Height Pulse Oximetry BMI (Body Mass Index)       93 96.8  F (36  C) 5' 6.5\" (1.689 m) 93% 31.96 kg/m2        Blood Pressure from Last 3 Encounters:   08/06/18 122/62   05/10/18 112/64   01/26/18 132/70    Weight from Last 3 " Encounters:   08/06/18 201 lb (91.2 kg)   05/10/18 203 lb (92.1 kg)   01/26/18 200 lb (90.7 kg)              Today, you had the following     No orders found for display         Today's Medication Changes          These changes are accurate as of 8/6/18  5:07 PM.  If you have any questions, ask your nurse or doctor.               These medicines have changed or have updated prescriptions.        Dose/Directions    traMADol 50 MG tablet   Commonly known as:  ULTRAM   This may have changed:    - how much to take  - when to take this   Used for:  Bilateral low back pain with sciatica, sciatica laterality unspecified, unspecified chronicity        Dose:  50 mg   Take 1 tablet (50 mg) by mouth every 8 hours as needed for pain   Quantity:  90 tablet   Refills:  5         Stop taking these medicines if you haven't already. Please contact your care team if you have questions.     ZOCOR 20 MG tablet   Generic drug:  simvastatin   Stopped by:  Eric Bernal MD                    Primary Care Provider Office Phone # Fax #    Eric Bernal [phone]       [address] [place] [address]        Equal Access to Services     [facility]: Hadii svetlana ku hadasho Soomaali, waaxda luqadaha, qaybta kaalmada ademarie, burton day . So [facility] [phone].    ATENCIÓN: Si habla español, tiene a estrella disposición servicios gratuitos de asistencia lingüística. [facility] [phone].    We comply with applicable federal civil rights laws and [place] laws. We do not discriminate on the basis of race, color, national origin, age, disability, sex, sexual orientation, or gender identity.            Thank you!     Thank you for choosing [facility]  for your care. Our goal is always to provide you with excellent care. Hearing back from our patients is one way we can continue to improve our services. Please take a few minutes to complete the written survey that you may  receive in the mail after your visit with us. Thank you!             Your Updated Medication List - Protect others around you: Learn how to safely use, store and throw away your medicines at www.disposemymeds.org.          This list is accurate as of 8/6/18  5:07 PM.  Always use your most recent med list.                   Brand Name Dispense Instructions for use Diagnosis    acetaminophen 500 MG tablet    TYLENOL     Take 500-1,000 mg by mouth every 6 hours as needed        albuterol 108 (90 Base) MCG/ACT Inhaler    PROAIR HFA/PROVENTIL HFA/VENTOLIN HFA     Inhale 2 puffs into the lungs every 4 hours as needed for shortness of breath / dyspnea or wheezing        aspirin 81 MG tablet      Take 81 mg by mouth daily        calcium carbonate 500 MG chewable tablet    TUMS     Take 1 chew tab by mouth every 4 hours as needed for heartburn        cetirizine 10 MG tablet    zyrTEC     Take 10 mg by mouth daily as needed for allergies        cyclobenzaprine 10 MG tablet    FLEXERIL    60 tablet    TAKE 1 TABLET(10 MG) BY MOUTH TWICE DAILY    Abnormal involuntary movement       dextromethorphan-guaiFENesin  MG per 12 hr tablet    MUCINEX DM     Take 1 tablet by mouth every 12 hours Patient takes at night        fish Oil 1200 MG capsule      Take by mouth daily Fish oil 1290 omega 3 900        FLOMAX 0.4 MG capsule   Generic drug:  tamsulosin      Take 1 capsule by mouth At Bedtime.        IBUPROFEN PO      Take 200 mg by mouth every 6 hours as needed for moderate pain        losartan 50 MG tablet    COZAAR     Take 25 mg by mouth daily        omeprazole 20 MG tablet      Take 1 tablet by mouth 2 times daily.        * order for DME     1 each    Elastic Back brace with stays and velcro enclosure    Chronic midline low back pain without sciatica       * order for DME     1 each    Tens unit.    Lumbar paraspinal muscle spasm       * order for DME     1 each    Tens unit and supplies    Chronic bilateral low back pain  with sciatica, sciatica laterality unspecified       senna-docusate 8.6-50 MG per tablet    SENOKOT-S;PERICOLACE     Take 1 tablet by mouth daily        SIMETHICONE-80 PO      Take 80 mg by mouth every 4 hours as needed for intestinal gas        temazepam 30 MG capsule    RESTORIL    15 capsule    Take 1 capsule (30 mg) by mouth nightly as needed    Other insomnia       traMADol 50 MG tablet    ULTRAM    90 tablet    Take 1 tablet (50 mg) by mouth every 8 hours as needed for pain    Bilateral low back pain with sciatica, sciatica laterality unspecified, unspecified chronicity       vitamin D3 2000 units Caps      Take by mouth daily        VOLTAREN 1 % Gel topical gel   Generic drug:  diclofenac      Apply 2 g topically every 6 hours as needed #2 tubes written by Dr Berumen and 2 refills.        * Notice:  This list has 3 medication(s) that are the same as other medications prescribed for you. Read the directions carefully, and ask your doctor or other care provider to review them with you.

## 2018-08-07 ASSESSMENT — PATIENT HEALTH QUESTIONNAIRE - PHQ9: SUM OF ALL RESPONSES TO PHQ QUESTIONS 1-9: 2

## 2018-08-07 ASSESSMENT — ANXIETY QUESTIONNAIRES: GAD7 TOTAL SCORE: 0

## 2018-08-10 NOTE — PROGRESS NOTES
SUBJECTIVE:                                                    Kenyon Rivera is a 84 year old male who presents to clinic today for the following health issues:    Hypertension Follow-up      Outpatient blood pressures are being checked at home.  Results are ranging from 114/44 - 125/75.    Low Salt Diet: low salt      Amount of exercise or physical activity: 2-3 days/week for an average of greater than 60 minutes    Problems taking medications regularly: No    Medication side effects: none    Diet: low salt        Hyperlipidemia Follow-Up      Rate your low fat/cholesterol diet?: not monitoring fat    Taking statin?  No, stopped due to muscle pain, sx have improved    Other lipid medications/supplements?:  none      Problem list and histories reviewed & adjusted, as indicated.  Additional history: as documented.  Muscle pains way better since stopping the statin.     Patient Active Problem List   Diagnosis     Advanced care planning/counseling discussion     Hypertension, Benign     Hyperlipidemia     Insomnia     Lumbago     Hypertrophy of prostate without urinary obstruction     ACP (advance care planning)     Controlled substance agreement signed     Blood glucose elevated     Past Surgical History:   Procedure Laterality Date     APPENDECTOMY       cataract extraction and lens implantation  3/2007    LEFT     CHOLECYSTECTOMY       electric stimulator implant       excision of Dupuytren's contracture extending into ring finger  02/08/2008    Bilateral     inguinal herniography      LEFT     NEPHRECTOMY  1992    RIGHT > Renal Cancer     OPEN REDUCTION INTERNAL FIXATION ANKLE      RIGHT     penile implant       TURP         Social History   Substance Use Topics     Smoking status: Former Smoker     Packs/day: 2.00     Years: 26.00     Types: Cigarettes     Start date: 1/1/1949     Quit date: 1/30/1975     Smokeless tobacco: Never Used      Comment: Tried to Quit (YES)     Alcohol use Yes      Comment: RARELY      Family History   Problem Relation Age of Onset     Diabetes Brother      C.A.D. Mother      Myocardial Infarction Mother      HEART DISEASE Mother      Heart Disease     Cancer Sister      Leukemia     Other - See Comments Other      Colitis         Current Outpatient Prescriptions   Medication Sig Dispense Refill     acetaminophen (TYLENOL) 500 MG tablet Take 500-1,000 mg by mouth every 6 hours as needed       albuterol (PROAIR HFA, PROVENTIL HFA, VENTOLIN HFA) 108 (90 BASE) MCG/ACT inhaler Inhale 2 puffs into the lungs every 4 hours as needed for shortness of breath / dyspnea or wheezing       aspirin 81 MG tablet Take 81 mg by mouth daily        calcium carbonate (TUMS) 500 MG chewable tablet Take 1 chew tab by mouth every 4 hours as needed for heartburn       cetirizine (ZYRTEC) 10 MG tablet Take 10 mg by mouth daily as needed for allergies       Cholecalciferol (VITAMIN D3) 2000 UNITS CAPS Take by mouth daily       cyclobenzaprine (FLEXERIL) 10 MG tablet TAKE 1 TABLET(10 MG) BY MOUTH TWICE DAILY 60 tablet 0     dextromethorphan-guaiFENesin (MUCINEX DM)  MG per 12 hr tablet Take 1 tablet by mouth every 12 hours Patient takes at night       diclofenac (VOLTAREN) 1 % GEL Apply 2 g topically every 6 hours as needed #2 tubes written by Dr Berumen and 2 refills.       IBUPROFEN PO Take 200 mg by mouth every 6 hours as needed for moderate pain       Omega-3 Fatty Acids (FISH OIL) 1200 MG CAPS Take by mouth daily Fish oil 1290 omega 3 900       omeprazole 20 MG tablet Take 1 tablet by mouth 2 times daily.       order for DME Tens unit and supplies 1 each 11     order for DME Tens unit. 1 each 1     order for DME Elastic Back brace with stays and velcro enclosure 1 each 0     senna-docusate (SENOKOT-S;PERICOLACE) 8.6-50 MG per tablet Take 1 tablet by mouth daily       SIMETHICONE-80 PO Take 80 mg by mouth every 4 hours as needed for intestinal gas       tamsulosin (FLOMAX) 0.4 MG 24 hr capsule Take 1 capsule by  "mouth At Bedtime.       temazepam (RESTORIL) 30 MG capsule Take 1 capsule (30 mg) by mouth nightly as needed 15 capsule 5     traMADol (ULTRAM) 50 MG tablet Take 1 tablet (50 mg) by mouth every 8 hours as needed for pain (Patient taking differently: Take 100 mg by mouth At Bedtime ) 90 tablet 5     losartan (COZAAR) 50 MG tablet Take 25 mg by mouth daily        Allergies   Allergen Reactions     Chlorzoxazone      Parafon Forte       ROS:  Constitutional, HEENT, cardiovascular, pulmonary, gi and gu systems are negative, except as otherwise noted.    OBJECTIVE:                                                    /72  Pulse 95  Temp 97.2  F (36.2  C) (Tympanic)  Resp 16  Ht 5' 8\" (1.727 m)  Wt 200 lb (90.7 kg)  SpO2 96%  BMI 30.41 kg/m2  Body mass index is 30.41 kg/(m^2).  GENERAL APPEARANCE: Alert, no acute distress  CV: regular rate and rhythm, no murmur, rub or gallop  RESP: lungs clear to auscultation bilaterally  ABDOMEN: normal bowel sounds, soft, nontender, no hepatosplenomegaly or other masses  SKIN: no suspicious lesions or rashes to visualized skin  NEURO: Alert, oriented x 3, speech and mentation normal      [facility]       ASSESSMENT/PLAN:                                                    1. Other hyperlipidemia  Discussed.  Doing great. Update today and in 6 months.  Discussed statins in previous visit as well.    - Lipid Profile (Chol, Trig, HDL, LDL calc)  - Comprehensive metabolic panel (BMP + Alb, Alk Phos, ALT, AST, Total. Bili, TP)    2. Hypertension, Benign  Stable.  bp stable off the ARB.  Recheck kidney fx today as well.  bp is perfect without the med.        (N28.9) Renal insufficiency  Comment: [facility] 1.5[facility]  [facility].  Now off the valsartan.    Plan: we discussed how this whole thing works at some length.  Going to recheck and follow.  Again, bp stable off the bp med.          Eric Bernal MD  [facility]      "

## 2018-08-14 ENCOUNTER — OFFICE VISIT (OUTPATIENT)
Dept: FAMILY MEDICINE | Facility: OTHER | Age: 83
End: 2018-08-14
Attending: FAMILY MEDICINE
Payer: COMMERCIAL

## 2018-08-14 VITALS
RESPIRATION RATE: 16 BRPM | DIASTOLIC BLOOD PRESSURE: 72 MMHG | HEIGHT: 68 IN | TEMPERATURE: 97.2 F | WEIGHT: 200 LBS | HEART RATE: 95 BPM | BODY MASS INDEX: 30.31 KG/M2 | OXYGEN SATURATION: 96 % | SYSTOLIC BLOOD PRESSURE: 132 MMHG

## 2018-08-14 DIAGNOSIS — I10 ESSENTIAL HYPERTENSION, BENIGN: ICD-10-CM

## 2018-08-14 DIAGNOSIS — E78.49 OTHER HYPERLIPIDEMIA: Primary | ICD-10-CM

## 2018-08-14 DIAGNOSIS — N28.9 RENAL INSUFFICIENCY: ICD-10-CM

## 2018-08-14 LAB
ALBUMIN SERPL-MCNC: 3.7 G/DL (ref 3.4–5)
ALP SERPL-CCNC: 91 U/L (ref 40–150)
ALT SERPL W P-5'-P-CCNC: 20 U/L (ref 0–70)
ANION GAP SERPL CALCULATED.3IONS-SCNC: 10 MMOL/L (ref 3–14)
AST SERPL W P-5'-P-CCNC: 17 U/L (ref 0–45)
BILIRUB SERPL-MCNC: 0.4 MG/DL (ref 0.2–1.3)
BUN SERPL-MCNC: 27 MG/DL (ref 7–30)
CALCIUM SERPL-MCNC: 8.9 MG/DL (ref 8.5–10.1)
CHLORIDE SERPL-SCNC: 105 MMOL/L (ref 94–109)
CHOLEST SERPL-MCNC: 163 MG/DL
CO2 SERPL-SCNC: 25 MMOL/L (ref 20–32)
CREAT SERPL-MCNC: 1.62 MG/DL (ref 0.66–1.25)
GFR SERPL CREATININE-BSD FRML MDRD: 41 ML/MIN/1.7M2
GLUCOSE SERPL-MCNC: 102 MG/DL (ref 70–99)
HDLC SERPL-MCNC: 31 MG/DL
LDLC SERPL CALC-MCNC: 88 MG/DL
NONHDLC SERPL-MCNC: 132 MG/DL
POTASSIUM SERPL-SCNC: 4.4 MMOL/L (ref 3.4–5.3)
PROT SERPL-MCNC: 7.7 G/DL (ref 6.8–8.8)
SODIUM SERPL-SCNC: 140 MMOL/L (ref 133–144)
TRIGL SERPL-MCNC: 221 MG/DL

## 2018-08-14 PROCEDURE — 99214 OFFICE O/P EST MOD 30 MIN: CPT | Performed by: FAMILY MEDICINE

## 2018-08-14 PROCEDURE — 80053 COMPREHEN METABOLIC PANEL: CPT | Mod: ZL | Performed by: FAMILY MEDICINE

## 2018-08-14 PROCEDURE — 36415 COLL VENOUS BLD VENIPUNCTURE: CPT | Mod: ZL | Performed by: FAMILY MEDICINE

## 2018-08-14 PROCEDURE — G0463 HOSPITAL OUTPT CLINIC VISIT: HCPCS

## 2018-08-14 PROCEDURE — 80061 LIPID PANEL: CPT | Mod: ZL | Performed by: FAMILY MEDICINE

## 2018-08-14 ASSESSMENT — ANXIETY QUESTIONNAIRES
6. BECOMING EASILY ANNOYED OR IRRITABLE: NOT AT ALL
GAD7 TOTAL SCORE: 0
IF YOU CHECKED OFF ANY PROBLEMS ON THIS QUESTIONNAIRE, HOW DIFFICULT HAVE THESE PROBLEMS MADE IT FOR YOU TO DO YOUR WORK, TAKE CARE OF THINGS AT HOME, OR GET ALONG WITH OTHER PEOPLE: NOT DIFFICULT AT ALL
5. BEING SO RESTLESS THAT IT IS HARD TO SIT STILL: NOT AT ALL
7. FEELING AFRAID AS IF SOMETHING AWFUL MIGHT HAPPEN: NOT AT ALL
3. WORRYING TOO MUCH ABOUT DIFFERENT THINGS: NOT AT ALL
2. NOT BEING ABLE TO STOP OR CONTROL WORRYING: NOT AT ALL
1. FEELING NERVOUS, ANXIOUS, OR ON EDGE: NOT AT ALL

## 2018-08-14 ASSESSMENT — PAIN SCALES - GENERAL
PAINLEVEL: MILD PAIN (3)
PAINLEVEL: MILD PAIN (3)
PAINLEVEL: NO PAIN (0)

## 2018-08-14 ASSESSMENT — PATIENT HEALTH QUESTIONNAIRE - PHQ9: 5. POOR APPETITE OR OVEREATING: NOT AT ALL

## 2018-08-14 NOTE — NURSING NOTE
"Chief Complaint   Patient presents with     Hypertension       Initial /72  Pulse 95  Temp 97.2  F (36.2  C) (Tympanic)  Resp 16  Ht 5' 8\" (1.727 m)  Wt 200 lb (90.7 kg)  SpO2 96%  BMI 30.41 kg/m2 Estimated body mass index is 30.41 kg/(m^2) as calculated from the following:    Height as of this encounter: 5' 8\" (1.727 m).    Weight as of this encounter: 200 lb (90.7 kg).  Medication Reconciliation: complete    Orly Domingo LPN    "

## 2018-08-14 NOTE — MR AVS SNAPSHOT
"              After Visit Summary   8/14/2018    Kenyon Rivera    MRN: [id number]           Patient Information     Date Of Birth          5/31/1934        Visit Information        Provider Department      8/14/2018 10:15 AM Eric Bernal MD [facility]        Today's Diagnoses     Other hyperlipidemia    -  1    Hypertension, Benign        Renal insufficiency          Care Instructions    F/u with ongoing concerns.             Follow-ups after your visit        Who to contact     If you have questions or need follow up information about today's clinic visit or your schedule please contact [facility] directly at [phone].  Normal or non-critical lab and imaging results will be communicated to you by MyChart, letter or phone within 4 business days after the clinic has received the results. If you do not hear from us within 7 days, please contact the clinic through MyChart or phone. If you have a critical or abnormal lab result, we will notify you by phone as soon as possible.  Submit refill requests through [employer] or call your pharmacy and they will forward the refill request to us. Please allow 3 business days for your refill to be completed.          Additional Information About Your Visit        Care EveryWhere ID     This is your Care EveryWhere ID. This could be used by other organizations to access your [facility] medical records  [id number]        Your Vitals Were     Pulse Temperature Respirations Height Pulse Oximetry BMI (Body Mass Index)    95 97.2  F (36.2  C) (Tympanic) 16 5' 8\" (1.727 m) 96% 30.41 kg/m2       Blood Pressure from Last 3 Encounters:   08/14/18 132/72   08/06/18 122/62   05/10/18 112/64    Weight from Last 3 Encounters:   08/14/18 200 lb (90.7 kg)   08/06/18 201 lb (91.2 kg)   05/10/18 203 lb (92.1 kg)              We Performed the Following     Comprehensive metabolic panel (BMP + Alb, Alk Phos, ALT, AST, Total. Bili, TP)     Lipid Profile (Chol, " Trig, HDL, LDL calc)          Today's Medication Changes          These changes are accurate as of 8/14/18 10:53 AM.  If you have any questions, ask your nurse or doctor.               These medicines have changed or have updated prescriptions.        Dose/Directions    traMADol 50 MG tablet   Commonly known as:  ULTRAM   This may have changed:    - how much to take  - when to take this   Used for:  Bilateral low back pain with sciatica, sciatica laterality unspecified, unspecified chronicity        Dose:  50 mg   Take 1 tablet (50 mg) by mouth every 8 hours as needed for pain   Quantity:  90 tablet   Refills:  5                Primary Care Provider Office Phone # Fax #    Eric Bernal [phone]       [address] [place] [address]        Equal Access to Services     [facility] : Rolanda olsen hadjaeo Sodolores, waaxda luqadaha, qaybta kaalmada adeegyada, burton day . So [facility] [phone].    ATENCIÓN: Si habla español, tiene a estrella disposición servicios gratuitos de asistencia lingüística. [facility] [phone].    We comply with applicable federal civil rights laws and [place] laws. We do not discriminate on the basis of race, color, national origin, age, disability, sex, sexual orientation, or gender identity.            Thank you!     Thank you for choosing [facility]  for your care. Our goal is always to provide you with excellent care. Hearing back from our patients is one way we can continue to improve our services. Please take a few minutes to complete the written survey that you may receive in the mail after your visit with us. Thank you!             Your Updated Medication List - Protect others around you: Learn how to safely use, store and throw away your medicines at www.disposemymeds.org.          This list is accurate as of 8/14/18 10:53 AM.  Always use your most recent med list.                   Brand Name Dispense  Instructions for use Diagnosis    acetaminophen 500 MG tablet    TYLENOL     Take 500-1,000 mg by mouth every 6 hours as needed        albuterol 108 (90 Base) MCG/ACT inhaler    PROAIR HFA/PROVENTIL HFA/VENTOLIN HFA     Inhale 2 puffs into the lungs every 4 hours as needed for shortness of breath / dyspnea or wheezing        aspirin 81 MG tablet      Take 81 mg by mouth daily        calcium carbonate 500 MG chewable tablet    TUMS     Take 1 chew tab by mouth every 4 hours as needed for heartburn        cetirizine 10 MG tablet    zyrTEC     Take 10 mg by mouth daily as needed for allergies        cyclobenzaprine 10 MG tablet    FLEXERIL    60 tablet    TAKE 1 TABLET(10 MG) BY MOUTH TWICE DAILY    Abnormal involuntary movement       dextromethorphan-guaiFENesin  MG per 12 hr tablet    MUCINEX DM     Take 1 tablet by mouth every 12 hours Patient takes at night        fish Oil 1200 MG capsule      Take by mouth daily Fish oil 1290 omega 3 900        FLOMAX 0.4 MG capsule   Generic drug:  tamsulosin      Take 1 capsule by mouth At Bedtime.        IBUPROFEN PO      Take 200 mg by mouth every 6 hours as needed for moderate pain        losartan 50 MG tablet    COZAAR     Take 25 mg by mouth daily        omeprazole 20 MG tablet      Take 1 tablet by mouth 2 times daily.        * order for DME     1 each    Elastic Back brace with stays and velcro enclosure    Chronic midline low back pain without sciatica       * order for DME     1 each    Tens unit.    Lumbar paraspinal muscle spasm       * order for DME     1 each    Tens unit and supplies    Chronic bilateral low back pain with sciatica, sciatica laterality unspecified       senna-docusate 8.6-50 MG per tablet    SENOKOT-S;PERICOLACE     Take 1 tablet by mouth daily        SIMETHICONE-80 PO      Take 80 mg by mouth every 4 hours as needed for intestinal gas        temazepam 30 MG capsule    RESTORIL    15 capsule    Take 1 capsule (30 mg) by mouth nightly as  needed    Other insomnia       traMADol 50 MG tablet    ULTRAM    90 tablet    Take 1 tablet (50 mg) by mouth every 8 hours as needed for pain    Bilateral low back pain with sciatica, sciatica laterality unspecified, unspecified chronicity       vitamin D3 2000 units Caps      Take by mouth daily        VOLTAREN 1 % Gel topical gel   Generic drug:  diclofenac      Apply 2 g topically every 6 hours as needed #2 tubes written by Dr Berumen and 2 refills.        * Notice:  This list has 3 medication(s) that are the same as other medications prescribed for you. Read the directions carefully, and ask your doctor or other care provider to review them with you.

## 2018-08-15 ASSESSMENT — ANXIETY QUESTIONNAIRES: GAD7 TOTAL SCORE: 0

## 2018-08-15 ASSESSMENT — PATIENT HEALTH QUESTIONNAIRE - PHQ9: SUM OF ALL RESPONSES TO PHQ QUESTIONS 1-9: 0

## 2018-08-30 DIAGNOSIS — R25.9 ABNORMAL INVOLUNTARY MOVEMENT: ICD-10-CM

## 2018-08-31 RX ORDER — CYCLOBENZAPRINE HCL 10 MG
TABLET ORAL
Qty: 60 TABLET | Refills: 0 | Status: SHIPPED | OUTPATIENT
Start: 2018-08-31 | End: 2018-09-26

## 2018-08-31 NOTE — TELEPHONE ENCOUNTER
cyclobenzaprine (FLEXERIL) 10 MG tablet     Last Written Prescription Date:  08/01/2018  Last Fill Quantity: 60,   # refills: 0  Last Office Visit: 08/14/2018  Future Office visit:       Routing refill request to provider for review/approval because:

## 2018-09-10 ENCOUNTER — OFFICE VISIT (OUTPATIENT)
Dept: CHIROPRACTIC MEDICINE | Facility: OTHER | Age: 83
End: 2018-09-10
Attending: CHIROPRACTOR
Payer: OTHER MISCELLANEOUS

## 2018-09-10 DIAGNOSIS — M99.02 SEGMENTAL AND SOMATIC DYSFUNCTION OF THORACIC REGION: ICD-10-CM

## 2018-09-10 DIAGNOSIS — M99.03 SEGMENTAL AND SOMATIC DYSFUNCTION OF LUMBAR REGION: Primary | ICD-10-CM

## 2018-09-10 DIAGNOSIS — M54.50 ACUTE BILATERAL LOW BACK PAIN WITHOUT SCIATICA: ICD-10-CM

## 2018-09-10 PROCEDURE — 98940 CHIROPRACT MANJ 1-2 REGIONS: CPT | Mod: AT | Performed by: CHIROPRACTOR

## 2018-09-10 NOTE — MR AVS SNAPSHOT
After Visit Summary   9/10/2018    Kenyon Rivera    MRN: [id number]           Patient Information     Date Of Birth          5/31/1934        Visit Information        Provider Department      9/10/2018 9:40 AM Kenyon Syed DC  [facility] [place]        Today's Diagnoses     Segmental and somatic dysfunction of lumbar region    -  1    Acute bilateral low back pain without sciatica        Segmental and somatic dysfunction of thoracic region           Follow-ups after your visit        Who to contact     If you have questions or need follow up information about today's clinic visit or your schedule please contact  [facility] directly at [phone].  Normal or non-critical lab and imaging results will be communicated to you by MyChart, letter or phone within 4 business days after the clinic has received the results. If you do not hear from us within 7 days, please contact the clinic through MyChart or phone. If you have a critical or abnormal lab result, we will notify you by phone as soon as possible.  Submit refill requests through [employer] or call your pharmacy and they will forward the refill request to us. Please allow 3 business days for your refill to be completed.          Additional Information About Your Visit        Care EveryWhere ID     This is your Care EveryWhere ID. This could be used by other organizations to access your [place] medical records  [id number]         Blood Pressure from Last 3 Encounters:   08/14/18 132/72   08/06/18 122/62   05/10/18 112/64    Weight from Last 3 Encounters:   08/14/18 200 lb (90.7 kg)   08/06/18 201 lb (91.2 kg)   05/10/18 203 lb (92.1 kg)              We Performed the Following     CHIROPRAC MANIP,SPINAL,1-2 REGIONS          Today's Medication Changes          These changes are accurate as of 9/10/18 11:59 PM.  If you have any questions, ask your nurse or doctor.               These medicines have changed or have updated  prescriptions.        Dose/Directions    traMADol 50 MG tablet   Commonly known as:  ULTRAM   This may have changed:    - how much to take  - when to take this   Used for:  Bilateral low back pain with sciatica, sciatica laterality unspecified, unspecified chronicity        Dose:  50 mg   Take 1 tablet (50 mg) by mouth every 8 hours as needed for pain   Quantity:  90 tablet   Refills:  5                Primary Care Provider Office Phone # Fax #    Eric Bernal [phone]       [address] [place] [address]        Equal Access to Services     [facility] : Hadii svetlana olsen hadasho Soomaali, waaxda luqadaha, qaybta kaalmada adeegyada, burton day . So [facility] [phone].    ATENCIÓN: Si habla español, tiene a estrella disposición servicios gratuitos de asistencia lingüística. [facility] [phone].    We comply with applicable federal civil rights laws and [place] laws. We do not discriminate on the basis of race, color, national origin, age, disability, sex, sexual orientation, or gender identity.            Thank you!     Thank you for choosing  [facility]  for your care. Our goal is always to provide you with excellent care. Hearing back from our patients is one way we can continue to improve our services. Please take a few minutes to complete the written survey that you may receive in the mail after your visit with us. Thank you!             Your Updated Medication List - Protect others around you: Learn how to safely use, store and throw away your medicines at www.disposemymeds.org.          This list is accurate as of 9/10/18 11:59 PM.  Always use your most recent med list.                   Brand Name Dispense Instructions for use Diagnosis    acetaminophen 500 MG tablet    TYLENOL     Take 500-1,000 mg by mouth every 6 hours as needed        albuterol 108 (90 Base) MCG/ACT inhaler    PROAIR HFA/PROVENTIL HFA/VENTOLIN HFA     Inhale 2 puffs into the  lungs every 4 hours as needed for shortness of breath / dyspnea or wheezing        aspirin 81 MG tablet      Take 81 mg by mouth daily        calcium carbonate 500 MG chewable tablet    TUMS     Take 1 chew tab by mouth every 4 hours as needed for heartburn        cetirizine 10 MG tablet    zyrTEC     Take 10 mg by mouth daily as needed for allergies        cyclobenzaprine 10 MG tablet    FLEXERIL    60 tablet    TAKE 1 TABLET(10 MG) BY MOUTH TWICE DAILY    Abnormal involuntary movement       dextromethorphan-guaiFENesin  MG per 12 hr tablet    MUCINEX DM     Take 1 tablet by mouth every 12 hours Patient takes at night        fish Oil 1200 MG capsule      Take by mouth daily Fish oil 1290 omega 3 900        FLOMAX 0.4 MG capsule   Generic drug:  tamsulosin      Take 1 capsule by mouth At Bedtime.        IBUPROFEN PO      Take 200 mg by mouth every 6 hours as needed for moderate pain        losartan 50 MG tablet    COZAAR     Take 25 mg by mouth daily        omeprazole 20 MG tablet      Take 1 tablet by mouth 2 times daily.        * order for DME     1 each    Elastic Back brace with stays and velcro enclosure    Chronic midline low back pain without sciatica       * order for DME     1 each    Tens unit.    Lumbar paraspinal muscle spasm       * order for DME     1 each    Tens unit and supplies    Chronic bilateral low back pain with sciatica, sciatica laterality unspecified       senna-docusate 8.6-50 MG per tablet    SENOKOT-S;PERICOLACE     Take 1 tablet by mouth daily        SIMETHICONE-80 PO      Take 80 mg by mouth every 4 hours as needed for intestinal gas        temazepam 30 MG capsule    RESTORIL    15 capsule    Take 1 capsule (30 mg) by mouth nightly as needed    Other insomnia       traMADol 50 MG tablet    ULTRAM    90 tablet    Take 1 tablet (50 mg) by mouth every 8 hours as needed for pain    Bilateral low back pain with sciatica, sciatica laterality unspecified, unspecified chronicity        vitamin D3 2000 units Caps      Take by mouth daily        VOLTAREN 1 % Gel topical gel   Generic drug:  diclofenac      Apply 2 g topically every 6 hours as needed #2 tubes written by Dr Berumen and 2 refills.        * Notice:  This list has 3 medication(s) that are the same as other medications prescribed for you. Read the directions carefully, and ask your doctor or other care provider to review them with you.

## 2018-09-26 DIAGNOSIS — R25.9 ABNORMAL INVOLUNTARY MOVEMENT: ICD-10-CM

## 2018-09-26 RX ORDER — CYCLOBENZAPRINE HCL 10 MG
TABLET ORAL
Qty: 60 TABLET | Refills: 0 | Status: SHIPPED | OUTPATIENT
Start: 2018-09-26 | End: 2018-10-08

## 2018-09-26 NOTE — TELEPHONE ENCOUNTER
flexeril      Last Written Prescription Date:  8/31/18  Last Fill Quantity: 60,   # refills: 0  Last Office Visit: 8/14/18  Future Office visit:    Next 5 appointments (look out 90 days)     Oct 08, 2018 10:15 AM CDT   (Arrive by 10:00 AM)   [facility]   [facility]    [address] [facility] [address]   [phone]                   Routing refill request to provider for review/approval because:  Drug not on the [facility], [facility] or [facility] refill protocol or controlled substance

## 2018-10-02 ENCOUNTER — TRANSFERRED RECORDS (OUTPATIENT)
Dept: HEALTH INFORMATION MANAGEMENT | Facility: CLINIC | Age: 83
End: 2018-10-02

## 2018-10-05 NOTE — PROGRESS NOTES
Occupational Visit     Subjective:  Kenyon Rivera, 84 year old, male is seen for a follow up on work comp back injury.  The date of injury is 05/31/17.  The patient was employed at [employer].  His back is worse of late.  He would like traction and chiropractic at this point, which has helped int he past.       Allergies   Allergen Reactions     Chlorzoxazone      Parafon Forte         Review of Systems:  Constitutional, HEENT, cardiovascular, pulmonary, gi and gu systems are negative, except as otherwise noted.      OBJECTIVE:  Vitals: B/P: Data Unavailable, T: Data Unavailable, P: Data Unavailable, R: Data Unavailable      Exam:  MSK:  Pain with any movement of the torso located int he low back.  Walks with a slight limp.       Labs: none.       ASSESSMENT/PLAN:    (S39.012S) Strain of lumbar region, sequela  (primary encounter diagnosis)  Comment: ongoing, with a pain increase lately.   Plan: PHYSICAL THERAPY REFERRAL, CHIROPRACTIC         REFERRAL        Going with what has worked before.  Continue meds as is.  PT for traction.  Chiropractic as this is helpful as well.  F/u based on ongoing clinical picture.     (R25.9) Abnormal involuntary movement  Comment: legs, related to the back.   Plan: cyclobenzaprine (FLEXERIL) 10 MG tablet        Flexeril refilled for the year.

## 2018-10-08 ENCOUNTER — OFFICE VISIT (OUTPATIENT)
Dept: FAMILY MEDICINE | Facility: OTHER | Age: 83
End: 2018-10-08
Attending: FAMILY MEDICINE
Payer: OTHER MISCELLANEOUS

## 2018-10-08 VITALS
TEMPERATURE: 97.3 F | SYSTOLIC BLOOD PRESSURE: 108 MMHG | HEIGHT: 66 IN | HEART RATE: 101 BPM | WEIGHT: 198 LBS | OXYGEN SATURATION: 98 % | BODY MASS INDEX: 31.82 KG/M2 | DIASTOLIC BLOOD PRESSURE: 60 MMHG

## 2018-10-08 DIAGNOSIS — S39.012S STRAIN OF LUMBAR REGION, SEQUELA: Primary | ICD-10-CM

## 2018-10-08 DIAGNOSIS — R25.9 ABNORMAL INVOLUNTARY MOVEMENT: ICD-10-CM

## 2018-10-08 PROCEDURE — 99213 OFFICE O/P EST LOW 20 MIN: CPT | Performed by: FAMILY MEDICINE

## 2018-10-08 RX ORDER — CYCLOBENZAPRINE HCL 10 MG
TABLET ORAL
Qty: 180 TABLET | Refills: 3 | Status: SHIPPED | OUTPATIENT
Start: 2018-10-08 | End: 2019-07-09

## 2018-10-08 ASSESSMENT — ANXIETY QUESTIONNAIRES
GAD7 TOTAL SCORE: 2
6. BECOMING EASILY ANNOYED OR IRRITABLE: NOT AT ALL
3. WORRYING TOO MUCH ABOUT DIFFERENT THINGS: NOT AT ALL
2. NOT BEING ABLE TO STOP OR CONTROL WORRYING: NOT AT ALL
1. FEELING NERVOUS, ANXIOUS, OR ON EDGE: NOT AT ALL
7. FEELING AFRAID AS IF SOMETHING AWFUL MIGHT HAPPEN: NOT AT ALL
5. BEING SO RESTLESS THAT IT IS HARD TO SIT STILL: SEVERAL DAYS

## 2018-10-08 ASSESSMENT — PATIENT HEALTH QUESTIONNAIRE - PHQ9: 5. POOR APPETITE OR OVEREATING: SEVERAL DAYS

## 2018-10-08 ASSESSMENT — PAIN SCALES - GENERAL: PAINLEVEL: MODERATE PAIN (5)

## 2018-10-08 NOTE — NURSING NOTE
"Chief Complaint   Patient presents with     Work Comp       Initial /60  Pulse 101  Temp 97.3  F (36.3  C)  Ht 5' 6\" (1.676 m)  Wt 198 lb (89.8 kg)  SpO2 98%  BMI 31.96 kg/m2 Estimated body mass index is 31.96 kg/(m^2) as calculated from the following:    Height as of this encounter: 5' 6\" (1.676 m).    Weight as of this encounter: 198 lb (89.8 kg).  Medication Reconciliation: complete    Yael Lozano LPN  "

## 2018-10-08 NOTE — MR AVS SNAPSHOT
After Visit Summary   10/8/2018    Kenyon Rivera    MRN: [id number]           Patient Information     Date Of Birth          5/31/1934        Visit Information        Provider Department      10/8/2018 10:15 AM Eric Bernal MD [facility]        Today's Diagnoses     Strain of lumbar region, sequela    -  1    Abnormal involuntary movement          Care Instructions    F/u with ongoing concerns.             Follow-ups after your visit        Additional Services     CHIROPRACTIC REFERRAL       Your provider has referred you to: Kenyon Syed    Please be aware that coverage of these services is subject to the terms and limitations of your health insurance plan.  Call member services at your health plan with any benefit or coverage questions.      Please bring the following to your appointment:    >>   Any x-rays, CTs or MRIs which have been performed.  Contact the facility where they were done to arrange for  prior to your scheduled appointment.    >>   List of current medications   >>   This referral request   >>   Any documents/labs given to you for this referral            PHYSICAL THERAPY REFERRAL       If you have not heard from the scheduling office within 2 business days, please call [phone] for all locations, with the exception of [place], please call [phone] and [place], please call [phone].    Please be aware that coverage of these services is subject to the terms and limitations of your health insurance plan.  Call member services at your health plan with any benefit or coverage questions.                  Your next 10 appointments already scheduled     Nov 07, 2018  8:45 AM CST   (Arrive by 8:30 AM)   Office Visit with Eric Bernal MD   [facility] )    [address] [place] [address]   [phone]           Bring a current list of meds and any records pertaining to  "this visit.  For Physicals, please bring immunization records and any forms needing to be filled out.  Please arrive 15 minutes early to complete paperwork and register.              Future tests that were ordered for you today     Open Future Orders        Priority Expected Expires Ordered    PHYSICAL THERAPY REFERRAL Routine  10/8/2019 10/8/2018            Who to contact     If you have questions or need follow up information about today's clinic visit or your schedule please contact [facility] directly at [phone].  Normal or non-critical lab and imaging results will be communicated to you by MyChart, letter or phone within 4 business days after the clinic has received the results. If you do not hear from us within 7 days, please contact the clinic through MyChart or phone. If you have a critical or abnormal lab result, we will notify you by phone as soon as possible.  Submit refill requests through [employer] or call your pharmacy and they will forward the refill request to us. Please allow 3 business days for your refill to be completed.          Additional Information About Your Visit        Care EveryWhere ID     This is your Care EveryWhere ID. This could be used by other organizations to access your [place] medical records  [id number]        Your Vitals Were     Pulse Temperature Height Pulse Oximetry BMI (Body Mass Index)       101 97.3  F (36.3  C) 5' 6\" (1.676 m) 98% 31.96 kg/m2        Blood Pressure from Last 3 Encounters:   10/08/18 108/60   08/14/18 132/72   08/06/18 122/62    Weight from Last 3 Encounters:   10/08/18 198 lb (89.8 kg)   08/14/18 200 lb (90.7 kg)   08/06/18 201 lb (91.2 kg)              We Performed the Following     CHIROPRACTIC REFERRAL          Today's Medication Changes          These changes are accurate as of 10/8/18 10:19 AM.  If you have any questions, ask your nurse or doctor.               These medicines have changed or have updated prescriptions. "        Dose/Directions    traMADol 50 MG tablet   Commonly known as:  ULTRAM   This may have changed:    - how much to take  - when to take this   Used for:  Bilateral low back pain with sciatica, sciatica laterality unspecified, unspecified chronicity        Dose:  50 mg   Take 1 tablet (50 mg) by mouth every 8 hours as needed for pain   Quantity:  90 tablet   Refills:  5         Stop taking these medicines if you haven't already. Please contact your care team if you have questions.     albuterol 108 (90 Base) MCG/ACT inhaler   Commonly known as:  PROAIR HFA/PROVENTIL HFA/VENTOLIN HFA           IBUPROFEN PO           losartan 50 MG tablet   Commonly known as:  COZAAR           VOLTAREN 1 % Gel topical gel   Generic drug:  diclofenac                Where to get your medicines      Some of these will need a paper prescription and others can be bought over the counter.  Ask your nurse if you have questions.     Bring a paper prescription for each of these medications     cyclobenzaprine 10 MG tablet                Primary Care Provider Office Phone # Fax #    Eric Bernal [phone]       [address] [place] [address]        Equal Access to Services     [facility]: Hadii svetlana olsen hadasho Soomaali, waaxda luqadaha, qaybta kaalmada adeegyaluis, waxroxi day . So [facility] [phone].    ATENCIÓN: Si habla español, tiene a estrella disposición servicios gratuitos de asistencia lingüística. [facility] al [phone].    We comply with applicable federal civil rights laws and [place] laws. We do not discriminate on the basis of race, color, national origin, age, disability, sex, sexual orientation, or gender identity.            Thank you!     Thank you for choosing [facility]  for your care. Our goal is always to provide you with excellent care. Hearing back from our patients is one way we can continue to improve our services. Please take a few minutes  to complete the written survey that you may receive in the mail after your visit with us. Thank you!             Your Updated Medication List - Protect others around you: Learn how to safely use, store and throw away your medicines at www.disposemymeds.org.          This list is accurate as of 10/8/18 10:19 AM.  Always use your most recent med list.                   Brand Name Dispense Instructions for use Diagnosis    acetaminophen 500 MG tablet    TYLENOL     Take 650 mg by mouth every 6 hours as needed        aspirin 81 MG tablet      Take 81 mg by mouth daily        calcium carbonate 500 MG chewable tablet    TUMS     Take 1 chew tab by mouth every 4 hours as needed for heartburn        cetirizine 10 MG tablet    zyrTEC     Take 10 mg by mouth daily as needed for allergies        cyclobenzaprine 10 MG tablet    FLEXERIL    180 tablet    TAKE 1 TABLET(10 MG) BY MOUTH TWICE DAILY    Abnormal involuntary movement       dextromethorphan-guaiFENesin  MG per 12 hr tablet    MUCINEX DM     Take 1 tablet by mouth every 12 hours Patient takes at night        fish Oil 1200 MG capsule      Take by mouth daily Fish oil 1290 omega 3 900        FLOMAX 0.4 MG capsule   Generic drug:  tamsulosin      Take 1 capsule by mouth At Bedtime.        omeprazole 20 MG tablet      Take 1 tablet by mouth 2 times daily.        * order for DME     1 each    Elastic Back brace with stays and velcro enclosure    Chronic midline low back pain without sciatica       * order for DME     1 each    Tens unit.    Lumbar paraspinal muscle spasm       * order for DME     1 each    Tens unit and supplies    Chronic bilateral low back pain with sciatica, sciatica laterality unspecified       senna-docusate 8.6-50 MG per tablet    SENOKOT-S;PERICOLACE     Take 1 tablet by mouth daily        SIMETHICONE-80 PO      Take 80 mg by mouth every 4 hours as needed for intestinal gas        temazepam 30 MG capsule    RESTORIL    15 capsule    Take 1  capsule (30 mg) by mouth nightly as needed    Other insomnia       traMADol 50 MG tablet    ULTRAM    90 tablet    Take 1 tablet (50 mg) by mouth every 8 hours as needed for pain    Bilateral low back pain with sciatica, sciatica laterality unspecified, unspecified chronicity       vitamin D3 2000 units Caps      Take by mouth daily        * Notice:  This list has 3 medication(s) that are the same as other medications prescribed for you. Read the directions carefully, and ask your doctor or other care provider to review them with you.

## 2018-10-09 ASSESSMENT — PATIENT HEALTH QUESTIONNAIRE - PHQ9: SUM OF ALL RESPONSES TO PHQ QUESTIONS 1-9: 2

## 2018-10-09 ASSESSMENT — ANXIETY QUESTIONNAIRES: GAD7 TOTAL SCORE: 2

## 2018-10-10 ENCOUNTER — TELEPHONE (OUTPATIENT)
Dept: FAMILY MEDICINE | Facility: OTHER | Age: 83
End: 2018-10-10

## 2018-10-10 NOTE — TELEPHONE ENCOUNTER
Patient was seen Monday with .  He is requesting Flexeril be re-faxed to [facility].  [facility] that [facility] to go to [facility]. Please re-fax.  Thank you!

## 2018-10-11 ENCOUNTER — OFFICE VISIT (OUTPATIENT)
Dept: CHIROPRACTIC MEDICINE | Facility: OTHER | Age: 83
End: 2018-10-11
Attending: CHIROPRACTOR
Payer: OTHER MISCELLANEOUS

## 2018-10-11 DIAGNOSIS — M54.50 ACUTE BILATERAL LOW BACK PAIN WITHOUT SCIATICA: ICD-10-CM

## 2018-10-11 DIAGNOSIS — M99.03 SEGMENTAL AND SOMATIC DYSFUNCTION OF LUMBAR REGION: Primary | ICD-10-CM

## 2018-10-11 DIAGNOSIS — M99.02 SEGMENTAL AND SOMATIC DYSFUNCTION OF THORACIC REGION: ICD-10-CM

## 2018-10-11 PROCEDURE — 98940 CHIROPRACT MANJ 1-2 REGIONS: CPT | Mod: AT | Performed by: CHIROPRACTOR

## 2018-10-11 NOTE — MR AVS SNAPSHOT
After Visit Summary   10/11/2018    Kenyon Rivera    MRN: [id number]           Patient Information     Date Of Birth          5/31/1934        Visit Information        Provider Department      10/11/2018 4:00 PM Kenyon Syed DC  [facility]        Today's Diagnoses     Segmental and somatic dysfunction of lumbar region    -  1    Acute bilateral low back pain without sciatica        Segmental and somatic dysfunction of thoracic region           Follow-ups after your visit        Your next 10 appointments already scheduled     Oct 24, 2018 10:30 AM CDT   (Arrive by 10:15 AM)   Evaluation with Liz Cole PT   [facility] ([facility] )    [address] [address]   [phone]            Nov 07, 2018  8:45 AM CST   (Arrive by 8:30 AM)   Office Visit with Eric Bernal MD   [facility]    [address] [facility] [address]   [phone]           Bring a current list of meds and any records pertaining to this visit.  For Physicals, please bring immunization records and any forms needing to be filled out.  Please arrive 15 minutes early to complete paperwork and register.              Who to contact     If you have questions or need follow up information about today's clinic visit or your schedule please contact  [facility] directly at [phone].  Normal or non-critical lab and imaging results will be communicated to you by MyChart, letter or phone within 4 business days after the clinic has received the results. If you do not hear from us within 7 days, please contact the clinic through MyChart or phone. If you have a critical or abnormal lab result, we will notify you by phone as soon as possible.  Submit refill requests through [employer] or call your pharmacy and they will forward the refill request to us. Please allow 3 business days for your refill to be completed.           Additional Information About Your Visit        Care EveryWhere ID     This is your Care EveryWhere ID. This could be used by other organizations to access your [place] medical records  [id number]         Blood Pressure from Last 3 Encounters:   10/08/18 108/60   08/14/18 132/72   08/06/18 122/62    Weight from Last 3 Encounters:   10/08/18 198 lb (89.8 kg)   08/14/18 200 lb (90.7 kg)   08/06/18 201 lb (91.2 kg)              We Performed the Following     CHIROPRAC MANIP,SPINAL,1-2 REGIONS          Today's Medication Changes          These changes are accurate as of 10/11/18 11:59 PM.  If you have any questions, ask your nurse or doctor.               These medicines have changed or have updated prescriptions.        Dose/Directions    traMADol 50 MG tablet   Commonly known as:  ULTRAM   This may have changed:    - how much to take  - when to take this   Used for:  Bilateral low back pain with sciatica, sciatica laterality unspecified, unspecified chronicity        Dose:  50 mg   Take 1 tablet (50 mg) by mouth every 8 hours as needed for pain   Quantity:  90 tablet   Refills:  5                Primary Care Provider Office Phone # Fax #    Eric Bernal [phone]       [address] [place] [address]        Equal Access to Services     [facility]: Hadii svetlana olsen hadasho Sobeenaali, waaxda luqadaha, qaybta kaalmada adeegyada, waxay oc islas. So [facility] [phone].    ATENCIÓN: Si habla español, tiene a estrella disposición servicios gratuitos de asistencia lingüística. [place] al [phone].    We comply with applicable federal civil rights laws and [place] laws. We do not discriminate on the basis of race, color, national origin, age, disability, sex, sexual orientation, or gender identity.            Thank you!     Thank you for choosing  [facility]  [facility] your [facility]. Our goal is always to provide you with excellent care. Hearing back from our  patients is one way we can continue to improve our services. Please take a few minutes to complete the written survey that you may receive in the mail after your visit with us. Thank you!             Your Updated Medication List - Protect others around you: Learn how to safely use, store and throw away your medicines at www.disposemymeds.org.          This list is accurate as of 10/11/18 11:59 PM.  Always use your most recent med list.                   Brand Name Dispense Instructions for use Diagnosis    acetaminophen 500 MG tablet    TYLENOL     Take 650 mg by mouth every 6 hours as needed        aspirin 81 MG tablet      Take 81 mg by mouth daily        calcium carbonate 500 MG chewable tablet    TUMS     Take 1 chew tab by mouth every 4 hours as needed for heartburn        cetirizine 10 MG tablet    zyrTEC     Take 10 mg by mouth daily as needed for allergies        cyclobenzaprine 10 MG tablet    FLEXERIL    180 tablet    TAKE 1 TABLET(10 MG) BY MOUTH TWICE DAILY    Abnormal involuntary movement       dextromethorphan-guaiFENesin  MG per 12 hr tablet    MUCINEX DM     Take 1 tablet by mouth every 12 hours Patient takes at night        fish Oil 1200 MG capsule      Take by mouth daily Fish oil 1290 omega 3 900        FLOMAX 0.4 MG capsule   Generic drug:  tamsulosin      Take 1 capsule by mouth At Bedtime.        omeprazole 20 MG tablet      Take 1 tablet by mouth 2 times daily.        * order for DME     1 each    Elastic Back brace with stays and velcro enclosure    Chronic midline low back pain without sciatica       * order for DME     1 each    Tens unit.    Lumbar paraspinal muscle spasm       * order for DME     1 each    Tens unit and supplies    Chronic bilateral low back pain with sciatica, sciatica laterality unspecified       senna-docusate 8.6-50 MG per tablet    SENOKOT-S;PERICOLACE     Take 1 tablet by mouth daily        SIMETHICONE-80 PO      Take 80 mg by mouth every 4 hours as needed for  intestinal gas        temazepam 30 MG capsule    RESTORIL    15 capsule    Take 1 capsule (30 mg) by mouth nightly as needed    Other insomnia       traMADol 50 MG tablet    ULTRAM    90 tablet    Take 1 tablet (50 mg) by mouth every 8 hours as needed for pain    Bilateral low back pain with sciatica, sciatica laterality unspecified, unspecified chronicity       vitamin D3 2000 units Caps      Take by mouth daily        * Notice:  This list has 3 medication(s) that are the same as other medications prescribed for you. Read the directions carefully, and ask your doctor or other care provider to review them with you.

## 2018-10-24 ENCOUNTER — HOSPITAL ENCOUNTER (OUTPATIENT)
Dept: PHYSICAL THERAPY | Facility: HOSPITAL | Age: 83
Setting detail: THERAPIES SERIES
End: 2018-10-24
Attending: FAMILY MEDICINE
Payer: OTHER MISCELLANEOUS

## 2018-10-24 DIAGNOSIS — S39.012S STRAIN OF LUMBAR REGION, SEQUELA: ICD-10-CM

## 2018-10-24 PROCEDURE — 97140 MANUAL THERAPY 1/> REGIONS: CPT | Mod: GP

## 2018-10-24 PROCEDURE — G8978 MOBILITY CURRENT STATUS: HCPCS | Mod: GP,CJ

## 2018-10-24 PROCEDURE — 97530 THERAPEUTIC ACTIVITIES: CPT | Mod: GP

## 2018-10-24 PROCEDURE — G8979 MOBILITY GOAL STATUS: HCPCS | Mod: GP,CJ

## 2018-10-24 PROCEDURE — 40000718 ZZHC STATISTIC PT DEPARTMENT ORTHO VISIT

## 2018-10-24 PROCEDURE — 97162 PT EVAL MOD COMPLEX 30 MIN: CPT | Mod: GP

## 2018-10-24 NOTE — PROGRESS NOTES
10/24/18 1015   General Information   Type of Visit Initial OP Ortho PT Evaluation   Start of Care Date 10/24/18   Referring Physician Eric Bernal MD   Orders Evaluate and Treat   Date of Order 10/08/18   Insurance Type Other   Insurance Comments/Visits Authorized Work Comp_[employer]   Medical Diagnosis Strain of lumbar region   Surgical/Medical history reviewed Yes   Precautions/Limitations no known precautions/limitations   Body Part(s)   Body Part(s) Lumbar Spine/SI   Presentation and Etiology   Pertinent history of current problem (include personal factors and/or comorbidities that impact the POC) C/O LBP. Onset when a tree fell on him in the woods while he was working. He was in and out of the hospital after he was injured in 1975. He has pain down both legs into the ankle and foot. Last night he had to get up and put a heating pad on his LB. It helps but it doesn't take the pain away. 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 He likes to garden and he goes to the [facility]. He likes to read, and fish sometimes. He tries to exercise at the  2-3 times each week. He comes in for traction for a couple weeks once or twice a year.    Impairments A. Pain;E. Decreased flexibility;K. Numbness;L. Tingling   Functional Limitations perform activities of daily living   How/Where did it occur With a fall;At work  (a tree fell on him in the woods)   Onset date of current episode/exacerbation 05/31/75   Chronicity Chronic   Pain rating (0-10 point scale) Best (/10);Worst (/10);Other   Best (/10) 3   Worst (/10) 10   Pain rating comment 5/10 right now   Pain quality A. Sharp;C. Aching;F. Stabbing;G. Cramping   Frequency of pain/symptoms A. Constant   Pain/symptoms exacerbated by C. Lifting;D. Carrying;G. Certain positions   Pain/symptoms eased by A. Sitting;B. Walking;C. Rest;G. Heat;H. Cold;I. OTC medication(s);J. Braces/supports;K. Other  (prescriptions)   Current Level of Function   Patient role/employment  history F. Retired   Living environment Apartment/condo   Fall Risk Screen   Fall screen completed by PT   Have you fallen 2 or more times in the past year? No   Have you fallen and had an injury in the past year? No   Is patient a fall risk? No   System Outcome Measures   Outcome Measures Low Back Pain (see Oswestry and Jelena)   Lumbar Spine/SI Objective Findings   Palpation Left SI jt locked and left LE 3/4 in shorter than right. Left ASIS, IC, PSIS, and IT all superior relative to right. Right sacral sulcus deep and left MICHAEL prominent. Improved by prone extension on elbows. LST/ANT. T3-L5=NSRRL.   Planned Therapy Interventions   Planned Therapy Interventions manual therapy;neuromuscular re-education;strengthening;stretching   Clinical Impression   Criteria for Skilled Therapeutic Interventions Met yes, treatment indicated   PT Diagnosis LBP mediated by somatic dysfunction at pelvic, sacral, lumbar, and thoracic segments, with functional leg length difference.   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   Education Assessment   Barriers to Learning No barriers   ORTHO GOALS   PT Ortho Eval Goals 1;2   Ortho Goal 1   Goal Identifier 1 Functional   Goal Description Improved Oswestry Disability Index score to 20% or better   Target Date 12/05/18   Ortho Goal 2   Goal Identifier 2 Outcome   Goal Description Resolution of mechanical dysfunction   Target Date  12/05/18   Total Evaluation Time   Total Evaluation Time 15'     Liz Cole, BENJAMIN    I certify the need for these services furnished under this plan of treatment and while under my care. (Physician co-signature of this document indicates review and certification of the therapy plan).

## 2018-10-30 ENCOUNTER — HOSPITAL ENCOUNTER (OUTPATIENT)
Dept: PHYSICAL THERAPY | Facility: HOSPITAL | Age: 83
Setting detail: THERAPIES SERIES
End: 2018-10-30
Attending: FAMILY MEDICINE
Payer: OTHER MISCELLANEOUS

## 2018-10-30 PROCEDURE — 97140 MANUAL THERAPY 1/> REGIONS: CPT | Mod: GP

## 2018-10-30 PROCEDURE — 40000718 ZZHC STATISTIC PT DEPARTMENT ORTHO VISIT

## 2018-11-01 ENCOUNTER — HOSPITAL ENCOUNTER (OUTPATIENT)
Dept: PHYSICAL THERAPY | Facility: HOSPITAL | Age: 83
Setting detail: THERAPIES SERIES
End: 2018-11-01
Attending: FAMILY MEDICINE
Payer: MEDICARE

## 2018-11-01 PROCEDURE — 97012 MECHANICAL TRACTION THERAPY: CPT | Mod: GP

## 2018-11-01 NOTE — PROGRESS NOTES
10/30/18 1300   Signing Clinician's Name / Credentials   Signing clinician's name / credentials Liz Cole DPT   Session Number   Session Number 2/12_Work Comp/Medicare secondary   Progress Note/Recertification   Progress Note Due Date 12/05/18   Recertification Due Date 01/22/19   Subjective Report   Subjective Report Patient seen [id number] for C/O LBP, chronic over many years. Patient wants traction. He says that's what he was sent for and that is what has worked for the past 20 years. Pain rated 3/10 right now. He took Tylenol this morning.    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 Leg lengths equal, pelvis level, and SI jts equally mobile. Sacral and lumbar segments WFL of mobility and alignment.    System Outcome Measures   Outcome Measures Low Back Pain (see Oswestry and Jelena)   Manual Therapy   Minutes 30'   Skilled Intervention man ther   Patient Response good   Treatment Detail Manual traction of dural sheath in caudal and cephalad glides, with patient in right sidelying.    Progress Post tx dural sheath improved   Plan   Homework Stand and sit with equal weight on right and left sides   Updates to plan of care Patient insists he receive lumbar traction with electrical stimulation. He refused continued manual therapy treatment. Patient will be transferred into Verna Patel DPT's schedule.    Plan for next session Tx per re-eval   Total Session Time   Timed Code Treatment Minutes 30'   Total Treatment Time (sum of timed and untimed services) 30'     Liz Cole DPT    I certify the need for these services furnished under this plan of treatment and while under my care. (Physician co-signature of this document indicates review and certification of the therapy plan).

## 2018-11-06 ENCOUNTER — HOSPITAL ENCOUNTER (OUTPATIENT)
Dept: PHYSICAL THERAPY | Facility: HOSPITAL | Age: 83
Setting detail: THERAPIES SERIES
End: 2018-11-06
Attending: FAMILY MEDICINE
Payer: MEDICARE

## 2018-11-06 PROCEDURE — 97012 MECHANICAL TRACTION THERAPY: CPT | Mod: GP

## 2018-11-06 NOTE — PATIENT INSTRUCTIONS
Preventive Health Recommendations:     See your health care provider every year to    Review health changes.     Discuss preventive care.      Review your medicines if your doctor has prescribed any.      Talk with your health care provider about whether you should have a test to screen for prostate cancer (PSA).    Every 3 years, have a diabetes test (fasting glucose). If you are at risk for diabetes, you should have this test more often.    Every 5 years, have a cholesterol test. Have this test more often if you are at risk for high cholesterol or heart disease.     Every 10 years, have a colonoscopy. Or, have a yearly FIT test (stool test). These exams will check for colon cancer.    Talk to with your health care provider about screening for Abdominal Aortic Aneurysm if you have a family history of AAA or have a history of smoking.    Shots:     Get a flu shot each year.     Get a tetanus shot every 10 years.     Talk to your doctor about your pneumonia vaccines. There are now two you should receive - Pneumovax (PPSV 23) and Prevnar (PCV 13).     Talk to your pharmacist about a shingles vaccine.     Talk to your doctor about the hepatitis B vaccine.  Nutrition:     Eat at least 5 servings of fruits and vegetables each day.     Eat whole-grain bread, whole-wheat pasta and brown rice instead of white grains and rice.     Get adequate Calcium and Vitamin D.   Lifestyle    Exercise for at least 150 minutes a week (30 minutes a day, 5 days a week). This will help you control your weight and prevent disease.     Limit alcohol to one drink per day.     No smoking.     Wear sunscreen to prevent skin cancer.    See your dentist every six months for an exam and cleaning.    See your eye doctor every 1 to 2 years to screen for conditions such as glaucoma, macular degeneration, cataracts, etc.    Personalized Prevention Plan  You are due for the preventive services outlined below.  Your care team is available to assist you  in scheduling these services.  If you have already completed any of these items, please share that information with your care team to update in your medical record.  Health Maintenance Due   Topic Date Due     Flu Vaccine (1) 09/01/2018     Tetanus Vaccine - every 10 years  10/13/2018

## 2018-11-06 NOTE — PROGRESS NOTES
"  SUBJECTIVE:   Kenyon Rivera is a 84 year old male who presents for Preventive Visit.      Are you in the first 12 months of your Medicare Part B coverage?  No    Physical Health:    In general, how would you rate your overall physical health? good    Outside of work, how many days during the week do you exercise? 2-3 days/week    Outside of work, approximately how many minutes a day do you exercise?greater than 60 minutes    If you drink alcohol do you typically have >3 drinks per day or >7 drinks per week? No    Do you usually eat at least 4 servings of fruit and vegetables a day, include whole grains & fiber and avoid regularly eating high fat or \"junk\" foods? Yes    Do you have any problems taking medications regularly?  No    Do you have any side effects from medications? not [id number] mouth and a little light headed in the am before getting up.     Needs assistance for the following daily activities: no assistance needed    Which of the following safety concerns are present in your home?:  none identified     Hearing impairment: Yes, uses hearing aid    In the past 6 months, have you been bothered by leaking of urine? no    Mental Health:    In general, how would you rate your overall mental or emotional health? good  PHQ-2 Score:      Additional concerns to address?  YES, having a little problem getting PT for his back.     Fall risk:      Fallen 2 or more times in the past year?: No  Any fall with injury in the past year?: No        COGNITIVE SCREEN  1) Repeat 3 items (Leader, Season, Table)    2) Clock draw: NORMAL  3) 3 item recall: Recalls NO objects   Results: 0 items recalled: PROBABLE COGNITIVE IMPAIRMENT, **INFORM PROVIDER**    Mini-CogTM Copyright MONICA Zheng. Licensed by the author for use in [facility]; reprinted with permission ([email or web address]@[email or web address]). All rights reserved.            PROBLEMS TO ADD ON...  -------------------------------------    Reviewed and updated as needed this visit " by clinical staff         Reviewed and updated as needed this visit by Provider        Social History   Substance Use Topics     Smoking status: Former Smoker     Packs/day: 2.00     Years: 26.00     Types: Cigarettes     Start date: 1/1/1949     Quit date: 1/30/1975     Smokeless tobacco: Never Used      Comment: Tried to Quit (YES)     Alcohol use Yes      Comment: RARELY                             Do you feel safe in your environment - Yes    Do you have a Health Care Directive?:     Current providers sharing in care for this patient include:   Patient Care Team:  Eric Bernal MD as PCP - General    The following health maintenance items are reviewed in Epic and correct as of today:  Health Maintenance   Topic Date Due     INFLUENZA VACCINE (1) 09/01/2018     TETANUS IMMUNIZATION (SYSTEM ASSIGNED)  10/13/2018     ALT Q1 YR  08/14/2019     BMP Q1 YR  08/14/2019     LIPID MONITORING Q1 YEAR  08/14/2019     FALL RISK ASSESSMENT  10/08/2019     TIMMY QUESTIONNAIRE 1 YEAR  10/08/2019     PHQ-9 Q1YR  10/08/2019     COLONOSCOPY Q5 YR  01/07/2021     ADVANCE DIRECTIVE PLANNING Q5 YRS  06/15/2021     PNEUMOCOCCAL  Completed     Labs reviewed in EPIC        ROS:  CONSTITUTIONAL: NEGATIVE for fever, chills, change in weight  INTEGUMENTARY/SKIN: NEGATIVE for worrisome rashes, moles or lesions  EYES: NEGATIVE for vision changes or irritation  ENT/MOUTH: NEGATIVE for ear, mouth and throat problems  RESP: NEGATIVE for significant cough or SOB  BREAST: NEGATIVE for masses, tenderness or discharge  CV: NEGATIVE for chest pain, palpitations or peripheral edema  GI: NEGATIVE for nausea, abdominal pain, heartburn, or change in bowel habits  : NEGATIVE for frequency, dysuria, or hematuria  MUSCULOSKELETAL: NEGATIVE for significant arthralgias or myalgia  NEURO: NEGATIVE for weakness, dizziness or paresthesias  ENDOCRINE: NEGATIVE for temperature intolerance, skin/hair changes  HEME: NEGATIVE for bleeding problems  PSYCHIATRIC:  "NEGATIVE for changes in mood or affect    OBJECTIVE:   There were no vitals taken for this visit. Estimated body mass index is 31.96 kg/(m^2) as calculated from the following:    Height as of 10/8/18: 5' 6\" (1.676 m).    Weight as of 10/8/18: 198 lb (89.8 kg).  EXAM:   GENERAL: healthy, alert and no distress  EYES: Eyes grossly normal to inspection, PERRL and conjunctivae and sclerae normal  HENT: ear canals and TM's normal, nose and mouth without ulcers or lesions  NECK: no adenopathy, no asymmetry, masses, or scars and thyroid normal to palpation  RESP: lungs clear to auscultation - no rales, rhonchi or wheezes  CV: regular rate and rhythm, normal S1 S2, no S3 or S4, no murmur, click or rub, no peripheral edema and peripheral pulses strong  ABDOMEN: soft, nontender, no hepatosplenomegaly, no masses and bowel sounds normal  MS: no gross musculoskeletal defects noted, no edema  SKIN: no suspicious lesions or rashes  NEURO: Normal strength and tone, mentation intact and speech normal  PSYCH: mentation appears normal, affect normal/bright    Diagnostic Test Results:  none     ASSESSMENT / PLAN:       ICD-10-CM    1. Encounter for routine adult medical exam with abnormal findings Z00.01    2. Hypertrophy of prostate without urinary obstruction N40.0    3. Anxiety F41.9 busPIRone (BUSPAR) 10 MG tablet   4. Chronic sinusitis, unspecified location J32.9 CT Sinus w/o Contrast       End of Life Planning:  Patient currently has an advanced directive:     COUNSELING:  Reviewed preventive health counseling, as reflected in patient instructions    BP Readings from Last 1 Encounters:   10/08/18 108/60     Estimated body mass index is 31.96 kg/(m^2) as calculated from the following:    Height as of 10/8/18: 5' 6\" (1.676 m).    Weight as of 10/8/18: 198 lb (89.8 kg).      Weight management plan: Discussed healthy diet and exercise guidelines and patient will follow up in 12 months in clinic to re-evaluate.     reports that he quit " smoking about 43 years ago. His smoking use included Cigarettes. He started smoking about 69 years ago. He has a 52.00 pack-year smoking history. He has never used smokeless tobacco.      Appropriate preventive services were discussed with this patient, including applicable screening as appropriate for cardiovascular disease, diabetes, osteopenia/osteoporosis, and glaucoma.  As appropriate for age/gender, discussed screening for colorectal cancer, prostate cancer, breast cancer, and cervical cancer. Checklist reviewing preventive services available has been given to the patient.    Reviewed patients plan of care and provided an AVS. The Basic Care Plan (routine screening as documented in Health Maintenance) for Kenyon meets the Care Plan requirement. This Care Plan has been established and reviewed with the Patient.    Counseling Resources:  ATP IV Guidelines  Pooled Cohorts Equation Calculator  Breast Cancer Risk Calculator  FRAX Risk Assessment  ICSI Preventive Guidelines  Dietary Guidelines for Americans, 2010  USDA's MyPlate  ASA Prophylaxis  Lung CA Screening    Eric Bernal MD  [facility]

## 2018-11-07 ENCOUNTER — OFFICE VISIT (OUTPATIENT)
Dept: FAMILY MEDICINE | Facility: OTHER | Age: 83
End: 2018-11-07
Attending: FAMILY MEDICINE
Payer: COMMERCIAL

## 2018-11-07 VITALS
HEIGHT: 67 IN | RESPIRATION RATE: 18 BRPM | BODY MASS INDEX: 31.39 KG/M2 | DIASTOLIC BLOOD PRESSURE: 68 MMHG | TEMPERATURE: 97.6 F | HEART RATE: 96 BPM | WEIGHT: 200 LBS | SYSTOLIC BLOOD PRESSURE: 148 MMHG | OXYGEN SATURATION: 94 %

## 2018-11-07 DIAGNOSIS — J32.9 CHRONIC SINUSITIS, UNSPECIFIED LOCATION: ICD-10-CM

## 2018-11-07 DIAGNOSIS — F41.9 ANXIETY: ICD-10-CM

## 2018-11-07 DIAGNOSIS — N40.0 HYPERTROPHY OF PROSTATE WITHOUT URINARY OBSTRUCTION: ICD-10-CM

## 2018-11-07 DIAGNOSIS — Z00.01 ENCOUNTER FOR ROUTINE ADULT MEDICAL EXAM WITH ABNORMAL FINDINGS: Primary | ICD-10-CM

## 2018-11-07 PROCEDURE — 99397 PER PM REEVAL EST PAT 65+ YR: CPT | Performed by: FAMILY MEDICINE

## 2018-11-07 RX ORDER — BUSPIRONE HYDROCHLORIDE 10 MG/1
10 TABLET ORAL 2 TIMES DAILY
Qty: 180 TABLET | Refills: 3 | Status: SHIPPED | OUTPATIENT
Start: 2018-11-07 | End: 2019-02-15

## 2018-11-07 ASSESSMENT — ANXIETY QUESTIONNAIRES
IF YOU CHECKED OFF ANY PROBLEMS ON THIS QUESTIONNAIRE, HOW DIFFICULT HAVE THESE PROBLEMS MADE IT FOR YOU TO DO YOUR WORK, TAKE CARE OF THINGS AT HOME, OR GET ALONG WITH OTHER PEOPLE: NOT DIFFICULT AT ALL
GAD7 TOTAL SCORE: 2
2. NOT BEING ABLE TO STOP OR CONTROL WORRYING: NOT AT ALL
1. FEELING NERVOUS, ANXIOUS, OR ON EDGE: SEVERAL DAYS
3. WORRYING TOO MUCH ABOUT DIFFERENT THINGS: SEVERAL DAYS
5. BEING SO RESTLESS THAT IT IS HARD TO SIT STILL: NOT AT ALL
6. BECOMING EASILY ANNOYED OR IRRITABLE: NOT AT ALL
7. FEELING AFRAID AS IF SOMETHING AWFUL MIGHT HAPPEN: NOT AT ALL

## 2018-11-07 ASSESSMENT — PAIN SCALES - GENERAL: PAINLEVEL: MILD PAIN (2)

## 2018-11-07 ASSESSMENT — PATIENT HEALTH QUESTIONNAIRE - PHQ9
SUM OF ALL RESPONSES TO PHQ QUESTIONS 1-9: 0
5. POOR APPETITE OR OVEREATING: NOT AT ALL

## 2018-11-07 NOTE — MR AVS SNAPSHOT
After Visit Summary   11/7/2018    Kenyon Rivera    MRN: [id number]           Patient Information     Date Of Birth          5/31/1934        Visit Information        Provider Department      11/7/2018 8:45 AM Eric Bernal MD [facility]        Today's Diagnoses     Encounter for routine adult medical exam with abnormal findings    -  1    Hypertrophy of prostate without urinary obstruction        Anxiety        Chronic sinusitis, unspecified location          Care Instructions      Preventive Health Recommendations:     See your health care provider every year to    Review health changes.     Discuss preventive care.      Review your medicines if your doctor has prescribed any.      Talk with your health care provider about whether you should have a test to screen for prostate cancer (PSA).    Every 3 years, have a diabetes test (fasting glucose). If you are at risk for diabetes, you should have this test more often.    Every 5 years, have a cholesterol test. Have this test more often if you are at risk for high cholesterol or heart disease.     Every 10 years, have a colonoscopy. Or, have a yearly FIT test (stool test). These exams will check for colon cancer.    Talk to with your health care provider about screening for Abdominal Aortic Aneurysm if you have a family history of AAA or have a history of smoking.    Shots:     Get a flu shot each year.     Get a tetanus shot every 10 years.     Talk to your doctor about your pneumonia vaccines. There are now two you should receive - Pneumovax (PPSV 23) and Prevnar (PCV 13).     Talk to your pharmacist about a shingles vaccine.     Talk to your doctor about the hepatitis B vaccine.  Nutrition:     Eat at least 5 servings of fruits and vegetables each day.     Eat whole-grain bread, whole-wheat pasta and brown rice instead of white grains and rice.     Get adequate Calcium and Vitamin D.   Lifestyle    Exercise for at  least 150 minutes a week (30 minutes a day, 5 days a week). This will help you control your weight and prevent disease.     Limit alcohol to one drink per day.     No smoking.     Wear sunscreen to prevent skin cancer.    See your dentist every six months for an exam and cleaning.    See your eye doctor every 1 to 2 years to screen for conditions such as glaucoma, macular degeneration, cataracts, etc.    Personalized Prevention Plan  You are due for the preventive services outlined below.  Your care team is available to assist you in scheduling these services.  If you have already completed any of these items, please share that information with your care team to update in your medical record.  Health Maintenance Due   Topic Date Due     Flu Vaccine (1) 09/01/2018     Tetanus Vaccine - every 10 years  10/13/2018             Follow-ups after your visit        Your next 10 appointments already scheduled     Nov 08, 2018  2:30 PM CST   LBP MEDIUM FOLLOW UP with Stephany Ng PTA   [facility] ([facility] )    [address] [address]   [phone]            Nov 09, 2018 11:00 AM CST   CT SINUS W/O CONTRAST with HICT1   HI CT SCAN ([facility] )    [address] [place] [id number] [phone]           How do I prepare for my exam? (Food and drink instructions) No Food and Drink Restrictions.  How do I prepare for my exam? (Other instructions) You do not need to do anything special to prepare for this exam. For a sinus scan: Use your nose spray (nasal decongestant spray) as directed.  What should I wear: Please wear loose clothing, such as a sweat suit or jogging clothes. Avoid snaps, zippers and other metal. We may ask you to undress and put on a hospital gown.  How long does the exam take: Most scans take less than 20 minutes.  What should I bring: Please bring any scans or X-rays taken at other hospitals, if similar tests were done. Also bring a list of your  medicines, including vitamins, minerals and over-the-counter drugs. It is safest to leave personal items at home.  Do I need a : No  is needed.  What do I need to tell my doctor? Be sure to tell your doctor: * If you have any allergies. * If there s any chance you are pregnant. * If you are breastfeeding.  What should I do after the exam: No restrictions, You may resume normal activities.  What is this test: A CT (computed tomography) scan is a series of pictures that allows us to look inside your body. The scanner creates images of the body in cross sections, much like slices of bread. This helps us see any problems more clearly.  Who should I call with questions: If you have any questions, please call the Imaging Department where you will have your exam. Directions, parking instructions, and other information is available on our website, [email or web address].            Nov 13, 2018 11:00 AM CST   LBP MEDIUM FOLLOW UP with Stephany Noha, PTA   [facility] ([facility] )    [address] [address]   [phone]            Nov 15, 2018 11:00 AM CST   LBP MEDIUM FOLLOW UP with Stephany Noha, PTA   [facility] ([facility] )    [address] [address]   [phone]            Nov 20, 2018 11:00 AM CST   LBP MEDIUM FOLLOW UP with Stephany Noha, PTA   [facility] ([facility] )    [address] [address]   [phone]            Nov 27, 2018 11:00 AM CST   LBP MEDIUM FOLLOW UP with Stephany Noha, PTA   [facility] ([facility] )    [address] [address]   [phone]            Nov 29, 2018 11:00 AM CST   LBP MEDIUM FOLLOW UP with Stephany Noha, PTA   [facility] ([facility] )    [address] [address]   [phone]            Dec 04, 2018 11:00 AM CST   LBP MEDIUM FOLLOW UP with Stephany Noha, PTA   [facility] ([facility]  "[facility] )    [address] [address]   [phone]            Dec 06, 2018 11:00 AM CST   LBP MEDIUM FOLLOW UP with Verna Patel, PT   [facility] ([facility] )    [address] [address]   [phone]              Future tests that were ordered for you today     Open Future Orders        Priority Expected Expires Ordered    CT Sinus w/o Contrast Routine  11/7/2019 11/7/2018            Who to contact     If you have questions or need follow up information about today's clinic visit or your schedule please contact [facility] directly at [phone].  Normal or non-critical lab and imaging results will be communicated to you by MyChart, letter or phone within 4 business days after the clinic has received the results. If you do not hear from us within 7 days, please contact the clinic through MyChart or phone. If you have a critical or abnormal lab result, we will notify you by phone as soon as possible.  Submit refill requests through [facility] or call your pharmacy and they will forward the refill request to us. Please allow 3 business days for your refill to be completed.          Additional Information About Your Visit        Care EveryWhere ID     This is your Care EveryWhere ID. This could be used by other organizations to access your [place] medical records  [id number]        Your Vitals Were     Pulse Temperature Respirations Height Pulse Oximetry BMI (Body Mass Index)    96 97.6  F (36.4  C) (Tympanic) 18 5' 7\" (1.702 m) 94% 31.32 kg/m2       Blood Pressure from Last 3 Encounters:   11/07/18 148/68   10/08/18 108/60   08/14/18 132/72    Weight from Last 3 Encounters:   11/07/18 200 lb (90.7 kg)   10/08/18 198 lb (89.8 kg)   08/14/18 200 lb (90.7 kg)                 Today's Medication Changes          These changes are accurate as of 11/7/18  9:45 AM.  If you have any questions, ask your nurse or doctor.               Start " taking these medicines.        Dose/Directions    busPIRone 10 MG tablet   Commonly known as:  BUSPAR   Used for:  Anxiety   Started by:  Eric Bernal MD        Dose:  10 mg   Take 1 tablet (10 mg) by mouth 2 times daily   Quantity:  180 tablet   Refills:  3         These medicines have changed or have updated prescriptions.        Dose/Directions    traMADol 50 MG tablet   Commonly known as:  ULTRAM   This may have changed:    - how much to take  - when to take this   Used for:  Bilateral low back pain with sciatica, sciatica laterality unspecified, unspecified chronicity        Dose:  50 mg   Take 1 tablet (50 mg) by mouth every 8 hours as needed for pain   Quantity:  90 tablet   Refills:  5            Where to get your medicines      Some of these will need a paper prescription and others can be bought over the counter.  Ask your nurse if you have questions.     Bring a paper prescription for each of these medications     busPIRone 10 MG tablet                Primary Care Provider Office Phone # Fax #    Eric Bernal [phone]       [address] [place] [address]        Equal Access to Services     [facility] AH: Hadii aad ku hadasho Soomaali, waaxda luqadaha, qaybta kaalmada adeegyada, waxay idiin hayaan hernán khearnestine day . So [facility] [phone].    ATENCIÓN: Si habla español, tiene a estrella disposición servicios gratuitos de asistencia lingüística. [facility] [phone].    We comply with applicable federal civil rights laws and [place] laws. We do not discriminate on the basis of race, color, national origin, age, disability, sex, sexual orientation, or gender identity.            Thank you!     Thank you for choosing [facility]  for your care. Our goal is always to provide you with excellent care. Hearing back from our patients is one way we can continue to improve our services. Please take a few minutes to complete the written survey that you may  receive in the mail after your visit with us. Thank you!             Your Updated Medication List - Protect others around you: Learn how to safely use, store and throw away your medicines at www.disposemymeds.org.          This list is accurate as of 11/7/18  9:45 AM.  Always use your most recent med list.                   Brand Name Dispense Instructions for use Diagnosis    acetaminophen 500 MG tablet    TYLENOL     Take 650 mg by mouth every 6 hours as needed        aspirin 81 MG tablet      Take 81 mg by mouth daily        busPIRone 10 MG tablet    BUSPAR    180 tablet    Take 1 tablet (10 mg) by mouth 2 times daily    Anxiety       calcium carbonate 500 MG chewable tablet    TUMS     Take 1 chew tab by mouth every 4 hours as needed for heartburn        cetirizine 10 MG tablet    zyrTEC     Take 10 mg by mouth daily as needed for allergies        cyclobenzaprine 10 MG tablet    FLEXERIL    180 tablet    TAKE 1 TABLET(10 MG) BY MOUTH TWICE DAILY    Abnormal involuntary movement       dextromethorphan-guaiFENesin  MG per 12 hr tablet    MUCINEX DM     Take 1 tablet by mouth every 12 hours Patient takes at night        fish Oil 1200 MG capsule      Take by mouth daily Fish oil 1290 omega 3 900        FLOMAX 0.4 MG capsule   Generic drug:  tamsulosin      Take 1 capsule by mouth At Bedtime.        omeprazole 20 MG tablet      Take 1 tablet by mouth 2 times daily.        * order for DME     1 each    Elastic Back brace with stays and velcro enclosure    Chronic midline low back pain without sciatica       * order for DME     1 each    Tens unit.    Lumbar paraspinal muscle spasm       * order for DME     1 each    Tens unit and supplies    Chronic bilateral low back pain with sciatica, sciatica laterality unspecified       senna-docusate 8.6-50 MG per tablet    SENOKOT-S;PERICOLACE     Take 1 tablet by mouth daily        SIMETHICONE-80 PO      Take 80 mg by mouth every 4 hours as needed for intestinal gas         temazepam 30 MG capsule    RESTORIL    15 capsule    Take 1 capsule (30 mg) by mouth nightly as needed    Other insomnia       traMADol 50 MG tablet    ULTRAM    90 tablet    Take 1 tablet (50 mg) by mouth every 8 hours as needed for pain    Bilateral low back pain with sciatica, sciatica laterality unspecified, unspecified chronicity       vitamin D3 2000 units Caps      Take by mouth daily        * Notice:  This list has 3 medication(s) that are the same as other medications prescribed for you. Read the directions carefully, and ask your doctor or other care provider to review them with you.

## 2018-11-07 NOTE — NURSING NOTE
"Chief Complaint   Patient presents with     Physical       Initial /68  Pulse 96  Temp 97.6  F (36.4  C) (Tympanic)  Resp 18  Ht 5' 7\" (1.702 m)  Wt 200 lb (90.7 kg)  SpO2 94%  BMI 31.32 kg/m2 Estimated body mass index is 31.32 kg/(m^2) as calculated from the following:    Height as of this encounter: 5' 7\" (1.702 m).    Weight as of this encounter: 200 lb (90.7 kg).  Medication Reconciliation: complete    Theresa Mixon LPN  "

## 2018-11-08 ENCOUNTER — HOSPITAL ENCOUNTER (OUTPATIENT)
Dept: PHYSICAL THERAPY | Facility: HOSPITAL | Age: 83
Setting detail: THERAPIES SERIES
End: 2018-11-08
Attending: FAMILY MEDICINE
Payer: MEDICARE

## 2018-11-08 PROCEDURE — 97012 MECHANICAL TRACTION THERAPY: CPT | Mod: GP

## 2018-11-08 ASSESSMENT — ANXIETY QUESTIONNAIRES: GAD7 TOTAL SCORE: 2

## 2018-11-09 ENCOUNTER — HOSPITAL ENCOUNTER (OUTPATIENT)
Dept: CT IMAGING | Facility: HOSPITAL | Age: 83
Discharge: HOME | End: 2018-11-09
Attending: FAMILY MEDICINE | Admitting: FAMILY MEDICINE
Payer: MEDICARE

## 2018-11-09 DIAGNOSIS — J32.9 CHRONIC SINUSITIS, UNSPECIFIED LOCATION: ICD-10-CM

## 2018-11-09 PROCEDURE — 70486 CT MAXILLOFACIAL W/O DYE: CPT | Mod: TC

## 2018-11-13 ENCOUNTER — HOSPITAL ENCOUNTER (OUTPATIENT)
Dept: PHYSICAL THERAPY | Facility: HOSPITAL | Age: 83
Setting detail: THERAPIES SERIES
End: 2018-11-13
Attending: FAMILY MEDICINE
Payer: MEDICARE

## 2018-11-13 DIAGNOSIS — J32.4 CHRONIC PANSINUSITIS: Primary | ICD-10-CM

## 2018-11-13 PROCEDURE — 97012 MECHANICAL TRACTION THERAPY: CPT | Mod: GP

## 2018-11-15 ENCOUNTER — HOSPITAL ENCOUNTER (OUTPATIENT)
Dept: PHYSICAL THERAPY | Facility: HOSPITAL | Age: 83
Setting detail: THERAPIES SERIES
End: 2018-11-15
Attending: FAMILY MEDICINE
Payer: MEDICARE

## 2018-11-15 PROCEDURE — 97012 MECHANICAL TRACTION THERAPY: CPT | Mod: GP

## 2018-11-20 ENCOUNTER — HOSPITAL ENCOUNTER (OUTPATIENT)
Dept: PHYSICAL THERAPY | Facility: HOSPITAL | Age: 83
Setting detail: THERAPIES SERIES
End: 2018-11-20
Attending: FAMILY MEDICINE
Payer: MEDICARE

## 2018-11-20 DIAGNOSIS — M54.40 BILATERAL LOW BACK PAIN WITH SCIATICA, SCIATICA LATERALITY UNSPECIFIED, UNSPECIFIED CHRONICITY: ICD-10-CM

## 2018-11-20 PROCEDURE — 97012 MECHANICAL TRACTION THERAPY: CPT | Mod: GP

## 2018-11-20 NOTE — TELEPHONE ENCOUNTER
Tramadol      Last Written Prescription Date:  4/24/18  Last Fill Quantity: 90,   # refills: 5  Last Office Visit: 11/7/18  Future Office visit:       Routing refill request to provider for review/approval because:  Drug not on the [facility], P or [facility] refill protocol or controlled substance

## 2018-11-21 RX ORDER — TRAMADOL HYDROCHLORIDE 50 MG/1
50 TABLET ORAL EVERY 8 HOURS PRN
Qty: 90 TABLET | Refills: 0 | Status: SHIPPED | OUTPATIENT
Start: 2018-11-21 | End: 2019-01-04

## 2018-11-27 ENCOUNTER — HOSPITAL ENCOUNTER (OUTPATIENT)
Dept: PHYSICAL THERAPY | Facility: HOSPITAL | Age: 83
Setting detail: THERAPIES SERIES
End: 2018-11-27
Attending: FAMILY MEDICINE
Payer: OTHER MISCELLANEOUS

## 2018-11-27 PROCEDURE — 97012 MECHANICAL TRACTION THERAPY: CPT | Mod: GP

## 2018-11-28 ENCOUNTER — OFFICE VISIT (OUTPATIENT)
Dept: CHIROPRACTIC MEDICINE | Facility: OTHER | Age: 83
End: 2018-11-28
Attending: CHIROPRACTOR
Payer: OTHER MISCELLANEOUS

## 2018-11-28 ENCOUNTER — OFFICE VISIT (OUTPATIENT)
Dept: OTOLARYNGOLOGY | Facility: OTHER | Age: 83
End: 2018-11-28
Attending: PHYSICIAN ASSISTANT
Payer: MEDICARE

## 2018-11-28 VITALS
DIASTOLIC BLOOD PRESSURE: 70 MMHG | OXYGEN SATURATION: 95 % | HEART RATE: 101 BPM | TEMPERATURE: 96.6 F | WEIGHT: 200 LBS | SYSTOLIC BLOOD PRESSURE: 130 MMHG | BODY MASS INDEX: 31.39 KG/M2 | HEIGHT: 67 IN

## 2018-11-28 DIAGNOSIS — J32.4 CHRONIC PANSINUSITIS: ICD-10-CM

## 2018-11-28 DIAGNOSIS — M99.03 SEGMENTAL AND SOMATIC DYSFUNCTION OF LUMBAR REGION: Primary | ICD-10-CM

## 2018-11-28 DIAGNOSIS — M99.02 SEGMENTAL AND SOMATIC DYSFUNCTION OF THORACIC REGION: ICD-10-CM

## 2018-11-28 DIAGNOSIS — M54.50 ACUTE BILATERAL LOW BACK PAIN WITHOUT SCIATICA: ICD-10-CM

## 2018-11-28 DIAGNOSIS — R09.82 POST-NASAL DRAINAGE: ICD-10-CM

## 2018-11-28 DIAGNOSIS — J31.0 CHRONIC RHINITIS: Primary | ICD-10-CM

## 2018-11-28 PROCEDURE — 98940 CHIROPRACT MANJ 1-2 REGIONS: CPT | Mod: AT | Performed by: CHIROPRACTOR

## 2018-11-28 PROCEDURE — G0463 HOSPITAL OUTPT CLINIC VISIT: HCPCS

## 2018-11-28 PROCEDURE — 31231 NASAL ENDOSCOPY DX: CPT | Performed by: PHYSICIAN ASSISTANT

## 2018-11-28 PROCEDURE — 99214 OFFICE O/P EST MOD 30 MIN: CPT | Mod: 25 | Performed by: PHYSICIAN ASSISTANT

## 2018-11-28 RX ORDER — SIMVASTATIN 40 MG
20 TABLET ORAL AT BEDTIME
COMMUNITY
End: 2019-05-30 | Stop reason: SINTOL

## 2018-11-28 RX ORDER — FLUTICASONE PROPIONATE 50 MCG
2 SPRAY, SUSPENSION (ML) NASAL DAILY
Qty: 3 BOTTLE | Refills: 11 | Status: SHIPPED | OUTPATIENT
Start: 2018-11-28 | End: 2019-02-07

## 2018-11-28 RX ORDER — FLUTICASONE PROPIONATE 50 MCG
2 SPRAY, SUSPENSION (ML) NASAL DAILY
Qty: 1 BOTTLE | Refills: 11 | Status: SHIPPED | OUTPATIENT
Start: 2018-11-28 | End: 2018-11-28

## 2018-11-28 ASSESSMENT — PAIN SCALES - GENERAL: PAINLEVEL: NO PAIN (1)

## 2018-11-28 NOTE — PROGRESS NOTES
Otolaryngology Consultation    Patient: Kenyon Rivera  : 1934    Patient presents with:  Sinusitis: chronic sinusitis.  referred by Dr Bernal      HPI:  Kenyon Rivera is a 84 year old male seen today for CRS and drainage. Kenyon reports ongoing sinus and congestion troubles for about 25 years. He was able to manage at home w/o remedies, but does feel it is worsening.   He has left temple pressure, but is intermittent. Kenyon denies maxillary pain, but frontal region is painful at times.   Increase in post nasal drainage and needing to cough to expel secretion. He has been using a spray(but unsure which one)  and OTC sinus medication. In the past he tried Muccinex with some relief.     He has mild seasonal allergies. He uses Zyrtec PRN. 4-5 year he uses Zyrtec during spring- summer months.       He takes po medication for reflux BID. He does feel this controls his reflux. Water intake 3-4 glasses. Caffeine- 3 cups daily.   Denies dysphagia, dysphonia.   TECHNIQUE: CT scan of the sinuses with sagittal coronal  reconstructions     FINDINGS: There is some minimal mucosal thickening in the floor of the  right maxillary sinus. The left maxillary sinus is developmentally  small with mild mucosal thickening. There is mild mucosal thickening  in the ethmoid sinuses. There is mild mucosal thickening seen in the  sphenoid sinuses. Mild frontal mucosal thickening is noted. Nasal  septum is midline turbinates appear intact. The retropharyngeal soft  tissues are normal.          IMPRESSION: Mild mucosal thickening in all the sinuses.       Current Outpatient Rx   Medication Sig Dispense Refill     acetaminophen (TYLENOL) 500 MG tablet Take 650 mg by mouth every 6 hours as needed        aspirin 81 MG tablet Take 81 mg by mouth daily        busPIRone (BUSPAR) 10 MG tablet Take 1 tablet (10 mg) by mouth 2 times daily 180 tablet 3     calcium carbonate (TUMS) 500 MG chewable tablet Take 1 chew tab by mouth every 4  hours as needed for heartburn       cetirizine (ZYRTEC) 10 MG tablet Take 10 mg by mouth daily as needed for allergies       Cholecalciferol (VITAMIN D3) 2000 UNITS CAPS Take by mouth daily       cyclobenzaprine (FLEXERIL) 10 MG tablet TAKE 1 TABLET(10 MG) BY MOUTH TWICE DAILY 180 tablet 3     dextromethorphan-guaiFENesin (MUCINEX DM)  MG per 12 hr tablet Take 1 tablet by mouth every 12 hours Patient takes at night       Omega-3 Fatty Acids (FISH OIL) 1200 MG CAPS Take by mouth daily Fish oil 1290 omega 3 900       omeprazole 20 MG tablet Take 1 tablet by mouth 2 times daily.       order for DME Tens unit and supplies 1 each 11     order for DME Tens unit. 1 each 1     order for DME Elastic Back brace with stays and velcro enclosure 1 each 0     senna-docusate (SENOKOT-S;PERICOLACE) 8.6-50 MG per tablet Take 1 tablet by mouth daily       SIMETHICONE-80 PO Take 80 mg by mouth every 4 hours as needed for intestinal gas       simvastatin (ZOCOR) 40 MG tablet Take 20 mg by mouth At Bedtime       tamsulosin (FLOMAX) 0.4 MG 24 hr capsule Take 1 capsule by mouth At Bedtime.       temazepam (RESTORIL) 30 MG capsule Take 1 capsule (30 mg) by mouth nightly as needed 15 capsule 5     traMADol (ULTRAM) 50 MG tablet Take 1 tablet (50 mg) by mouth every 8 hours as needed for pain 90 tablet 0       Allergies: Chlorzoxazone     Past Medical History:   Diagnosis Date     Calculus of gallbladder with other cholecystitis, without mention of obstruction 11/18/2004     Displacement of lumbar intervertebral disc without myelopathy 11/17/2000     Hyperplasia of prostate 01/04/2000     Hypertension, Benign 07/11/2011     Insomnia, unspecified 03/17/2011     Lumbago 11/02/1999     Nonallopathic lesion of cervical region, not elsewhere classified 12/19/2001     Nonallopathic lesion of lumbar region, not elsewhere classified 03/05/2003     Nonallopathic lesion of thoracic region, not elsewhere classified 11/17/2000     Other and  "unspecified hyperlipidemia 12/14/1999     Other diseases of respiratory system, not elsewhere classified 04/21/2004     Other malaise and fatigue 04/05/2006     Pneumonia, organism unspecified(486) 12/13/1999       Past Surgical History:   Procedure Laterality Date     APPENDECTOMY       cataract extraction and lens implantation  3/2007    LEFT     CHOLECYSTECTOMY       electric stimulator implant       excision of Dupuytren's contracture extending into ring finger  02/08/2008    Bilateral     inguinal herniography      LEFT     NEPHRECTOMY  1992    RIGHT > Renal Cancer     OPEN REDUCTION INTERNAL FIXATION ANKLE      RIGHT     penile implant       TURP         ENT family history reviewed    Social History   Substance Use Topics     Smoking status: Former Smoker     Packs/day: 2.00     Years: 26.00     Types: Cigarettes     Start date: 1/1/1949     Quit date: 1/30/1975     Smokeless tobacco: Never Used      Comment: Tried to Quit (YES)     Alcohol use Yes      Comment: RARELY       Review of Systems  ROS: 10 point ROS neg other than the symptoms noted above in the HPI    Physical Exam  /70 (BP Location: Right arm, Patient Position: Sitting, Cuff Size: Adult Large)  Pulse 101  Temp 96.6  F (35.9  C) (Tympanic)  Ht 1.702 m (5' 7\")  Wt 90.7 kg (200 lb)  SpO2 95%  BMI 31.32 kg/m2  General - The patient is well nourished and well developed, and appears to have good nutritional status.  Alert and oriented to person and place, answers questions and cooperates with examination appropriately.   Head and Face - Normocephalic and atraumatic, with no gross asymmetry noted.  The facial nerve is intact, with strong symmetric movements.  Voice and Breathing - The patient was breathing comfortably without the use of accessory muscles. There was no wheezing, stridor, or stertor.  The patients voice was clear and strong, and had appropriate pitch and quality.  Ears -The external auditory canals are patent, the tympanic " membranes are intact without effusion, retraction or mass.  Bony landmarks are intact.  Eyes - Extraocular movements intact, and the pupils were reactive to light.  Sclera were not icteric or injected, conjunctiva were pink and moist.  Mouth - Examination of the oral cavity showed pink, healthy oral mucosa. No lesions or ulcerations noted.  The tongue was mobile and midline, and the dentition were dentures. No oral sores,lesions.    Throat - The walls of the oropharynx were smooth, pink, moist, symmetric, and had no lesions or ulcerations.  The tonsillar pillars and soft palate were symmetric.  The uvula was midline on elevation.    Neck - Normal midline excursion of the laryngotracheal complex during swallowing.  Full range of motion on passive movement.  Palpation of the occipital, submental, submandibular, internal jugular chain, and supraclavicular nodes did not demonstrate any abnormal lymph nodes or masses.  Palpation of the thyroid was soft and smooth, with no nodules or goiter appreciated.  The trachea was mobile and midline.  Nose - External contour is symmetric, no gross deflection or scars.  Nasal mucosa is pink and moist with no abnormal mucus.  The septum and turbinates were evaluated:   No polyps, masses, or purulence noted on examination.    To evaluate the nose and sinuses, I performed rigid nasal endoscopy. The LPN had previously sprayed both nares with lidocaine and neosynephrine.    I began with the LEFT side using a 0 degree rigid nasal endoscope, and then similarly examined the RIGHT side    Findings:  Septum- Central bone spur to right, o/w midline  Inferior turbinates:  Mild hypertrophy. Non obstructive  Middle turbinate and middle meatus:  No purulence, no polyposis, no synechiae  Mucosa is  healthy throughout without polyps nor polypoid degeneration  NP is clear. ET patent, used flex to viz right ET.     ASSESSMENT:    ICD-10-CM    1. Chronic rhinitis J31.0    2. Post-nasal drainage R09.82     3. Chronic pansinusitis J32.4 fluticasone (FLONASE) 50 MCG/ACT nasal spray     DISCONTINUED: fluticasone (FLONASE) 50 MCG/ACT nasal spray     Discussed with Kenyon, he does have post nasal drainage/ phlegm secretions. Will try jaclyn med rinse and Flonase. Discussed side effects of nasal sprays at this time and he wishes to try.   Hopefully he will see some degree of relied with his symptoms.   Reviewed with him good oral cares, water intake, etc.   Kenyon may be noticing more drainage as it has changed. Recommended above as a trial or may consider AH. Reviewed office procedure reviewed for sinus dilations, however, overall he is asymptomatic     Reviewed Sinus CT with patient. Mild/ minimal changes noted throughout sinuses. Will work w/ management vs. Surgical. He agrees with this plan    Return to clinic in 4-6 weeks for recheck.         Nichole Rodriguez PA-C  ENT  [facility], [place]  [phone]

## 2018-11-28 NOTE — MR AVS SNAPSHOT
After Visit Summary   11/28/2018    Kenyon Rivera    MRN: [id number]           Patient Information     Date Of Birth          5/31/1934        Visit Information        Provider Department      11/28/2018 2:15 PM Nichole Rodriguez PA-C [facility]        Today's Diagnoses     Chronic pansinusitis          Care Instructions    Start Tito med rinse.   Rinse 1-2 times daily. Follow instructions.     -Obtain Tito Med Sinus rinse over the counter.    -Use warm distilled water and 2 packets of the salt solution that comes with the bottle, dissolve in bottle up to the 240 mL kar.  -Irrigate each side of your nose leaning over the sink, using 1/3 to 1/2 the volume of the bottle in each nostril every irrigation.  Irrigate 2 times daily.    Start Flonase 2 sprays to each nostril daily    Good oral cares.   Maintain good water intake.     Thank you for allowing [facility] and our ENT team to participate in your care.  If your medications are too expensive, please give the nurse a call.  We can possibly change this medication.  If you have a scheduling or an appointment question please contact our Health Unit Coordinator at their direct line [phone].   ALL nursing questions or concerns can be directed to your ENT nurse at: [phone] [facility]            Follow-ups after your visit        Your next 10 appointments already scheduled     Nov 29, 2018 11:00 AM CST   LBP MEDIUM FOLLOW UP with Stephany Ng PTA   [facility] ([facility] )    [address] [address]   [phone]            Dec 04, 2018 11:00 AM CST   LBP MEDIUM FOLLOW UP with Stephany Ng PTA   [facility] ([facility] )    [address] [address]   [phone]            Dec 06, 2018 11:00 AM CST   LBP MEDIUM FOLLOW UP with Verna Patel PT   [facility] ([facility] )    [address] [address]   [phone]          "     Who to contact     If you have questions or need follow up information about today's clinic visit or your schedule please contact [facility] directly at [phone].  Normal or non-critical lab and imaging results will be communicated to you by MyChart, letter or phone within 4 business days after the clinic has received the results. If you do not hear from us within 7 days, please contact the clinic through MyChart or phone. If you have a critical or abnormal lab result, we will notify you by phone as soon as possible.  Submit refill requests through [employer] or call your pharmacy and they will forward the refill request to us. Please allow 3 business days for your refill to be completed.          Additional Information About Your Visit        Care EveryWhere ID     This is your Care EveryWhere ID. This could be used by other organizations to access your [place] medical records  [id number]        Your Vitals Were     Pulse Temperature Height Pulse Oximetry BMI (Body Mass Index)       101 96.6  F (35.9  C) (Tympanic) 1.702 m (5' 7\") 95% 31.32 kg/m2        Blood Pressure from Last 3 Encounters:   11/28/18 130/70   11/07/18 148/68   10/08/18 108/60    Weight from Last 3 Encounters:   11/28/18 90.7 kg (200 lb)   11/07/18 90.7 kg (200 lb)   10/08/18 89.8 kg (198 lb)              Today, you had the following     No orders found for display         Today's Medication Changes          These changes are accurate as of 11/28/18  2:22 PM.  If you have any questions, ask your nurse or doctor.               Start taking these medicines.        Dose/Directions    fluticasone 50 MCG/ACT nasal spray   Commonly known as:  FLONASE   Used for:  Chronic pansinusitis   Started by:  Nichole Rodriguez PA-C        Dose:  2 spray   Spray 2 sprays into both nostrils daily   Quantity:  3 Bottle   Refills:  11            Where to get your medicines      Some of these will need a paper prescription and others can be " bought over the counter.  Ask your nurse if you have questions.     Bring a paper prescription for each of these medications     fluticasone 50 MCG/ACT nasal spray                Primary Care Provider Office Phone # Fax #    Eric Bernal -[phone]       [address] [place] [address]        Equal Access to Services     ERUM HOUSE : Hadii aad ku hadasho Soomaali, waaxda luqadaha, qaybta kaalmada adeegyada, waxay dennisein hayaan adedoe ephraim laaries . So [facility] [phone].    ATENCIÓN: Si habla español, tiene a estrella disposición servicios gratuitos de asistencia lingüística. [facility] [phone].    We comply with applicable federal civil rights laws and [place] laws. We do not discriminate on the basis of race, color, national origin, age, disability, sex, sexual orientation, or gender identity.            Thank you!     Thank you for choosing [facility]  for your care. Our goal is always to provide you with excellent care. Hearing back from our patients is one way we can continue to improve our services. Please take a few minutes to complete the written survey that you may receive in the mail after your visit with us. Thank you!             Your Updated Medication List - Protect others around you: Learn how to safely use, store and throw away your medicines at www.disposemymeds.org.          This list is accurate as of 11/28/18  2:22 PM.  Always use your most recent med list.                   Brand Name Dispense Instructions for use Diagnosis    acetaminophen 500 MG tablet    TYLENOL     Take 650 mg by mouth every 6 hours as needed        aspirin 81 MG tablet    ASA     Take 81 mg by mouth daily        busPIRone 10 MG tablet    BUSPAR    180 tablet    Take 1 tablet (10 mg) by mouth 2 times daily    Anxiety       calcium carbonate 500 MG chewable tablet    TUMS     Take 1 chew tab by mouth every 4 hours as needed for heartburn        cetirizine 10 MG tablet     zyrTEC     Take 10 mg by mouth daily as needed for allergies        cyclobenzaprine 10 MG tablet    FLEXERIL    180 tablet    TAKE 1 TABLET(10 MG) BY MOUTH TWICE DAILY    Abnormal involuntary movement       dextromethorphan-guaiFENesin  MG per 12 hr tablet    MUCINEX DM     Take 1 tablet by mouth every 12 hours Patient takes at night        fish Oil 1200 MG capsule      Take by mouth daily Fish oil 1290 omega 3 900        FLOMAX 0.4 MG capsule   Generic drug:  tamsulosin      Take 1 capsule by mouth At Bedtime.        fluticasone 50 MCG/ACT nasal spray    FLONASE    3 Bottle    Spray 2 sprays into both nostrils daily    Chronic pansinusitis       omeprazole 20 MG tablet      Take 1 tablet by mouth 2 times daily.        * order for DME     1 each    Elastic Back brace with stays and velcro enclosure    Chronic midline low back pain without sciatica       * order for DME     1 each    Tens unit.    Lumbar paraspinal muscle spasm       * order for DME     1 each    Tens unit and supplies    Chronic bilateral low back pain with sciatica, sciatica laterality unspecified       senna-docusate 8.6-50 MG tablet    SENOKOT-S/PERICOLACE     Take 1 tablet by mouth daily        SIMETHICONE-80 PO      Take 80 mg by mouth every 4 hours as needed for intestinal gas        simvastatin 40 MG tablet    ZOCOR     Take 20 mg by mouth At Bedtime        temazepam 30 MG capsule    RESTORIL    15 capsule    Take 1 capsule (30 mg) by mouth nightly as needed    Other insomnia       traMADol 50 MG tablet    ULTRAM    90 tablet    Take 1 tablet (50 mg) by mouth every 8 hours as needed for pain    Bilateral low back pain with sciatica, sciatica laterality unspecified, unspecified chronicity       vitamin D3 2000 units Caps      Take by mouth daily        * Notice:  This list has 3 medication(s) that are the same as other medications prescribed for you. Read the directions carefully, and ask your doctor or other care provider to review them  with you.

## 2018-11-28 NOTE — MR AVS SNAPSHOT
After Visit Summary   11/28/2018    Kenyon Rivera    MRN: [id number]           Patient Information     Date Of Birth          5/31/1934        Visit Information        Provider Department      11/28/2018 1:10 PM Kenyon Syed DC  [facility]        Today's Diagnoses     Segmental and somatic dysfunction of lumbar region    -  1    Acute bilateral low back pain without sciatica        Segmental and somatic dysfunction of thoracic region           Follow-ups after your visit        Your next 10 appointments already scheduled     Nov 29, 2018 11:00 AM CST   LBP MEDIUM FOLLOW UP with Verna Patel, PT   [facility] ([facility] )    [address] [address]   [phone]            Dec 04, 2018 11:00 AM CST   LBP MEDIUM FOLLOW UP with Stephany Ng PTA   [facility] ([facility] )    [address] [address]   [phone]            Dec 06, 2018 11:00 AM CST   LBP MEDIUM FOLLOW UP with Verna Patel, PT   [facility] ([facility] )    [address] [address]   [phone]            Jan 02, 2019  1:15 PM CST   (Arrive by 1:00 PM)   [facility]    [address] [facility] [address]   [phone]              Who to contact     If you have questions or need follow up information about today's clinic visit or your schedule please contact  [facility] directly at [phone].  Normal or non-critical lab and imaging results will be communicated to you by MyChart, letter or phone within 4 business days after the clinic has received the results. If you do not hear from us within 7 days, please contact the clinic through MyChart or phone. If you have a critical or abnormal lab result, we will notify you by phone as soon as possible.  Submit refill requests through [employer] or call your  pharmacy and they will forward the refill request to us. Please allow 3 business days for your refill to be completed.          Additional Information About Your Visit        Care EveryWhere ID     This is your Care EveryWhere ID. This could be used by other organizations to access your [place] medical records  [id number]         Blood Pressure from Last 3 Encounters:   11/28/18 130/70   11/07/18 148/68   10/08/18 108/60    Weight from Last 3 Encounters:   11/28/18 200 lb (90.7 kg)   11/07/18 200 lb (90.7 kg)   10/08/18 198 lb (89.8 kg)              We Performed the Following     CHIROPRAC MANIP,SPINAL,1-2 REGIONS          Today's Medication Changes          These changes are accurate as of 11/28/18 11:59 PM.  If you have any questions, ask your nurse or doctor.               Start taking these medicines.        Dose/Directions    fluticasone 50 MCG/ACT nasal spray   Commonly known as:  FLONASE   Used for:  Chronic pansinusitis   Started by:  Nichole Rodriguez PA-C        Dose:  2 spray   Spray 2 sprays into both nostrils daily   Quantity:  3 Bottle   Refills:  11            Where to get your medicines      Some of these will need a paper prescription and others can be bought over the counter.  Ask your nurse if you have questions.     Bring a paper prescription for each of these medications     fluticasone 50 MCG/ACT nasal spray                Primary Care Provider Office Phone # Fax #    Eric Bernal [phone]       [address] [place] [address]        Equal Access to Services     ERUM HOUSE : Hadii svetlana welsho Sodolores, waaxda luqadaha, qaybta kaalmada burton negron. So [facility] [phone].    ATENCIÓN: Si habla español, tiene a estrella disposición servicios gratuitos de asistencia lingüística. Llame al [phone].    We comply with applicable federal civil rights laws and [place] laws. We do not discriminate on the basis of race, color,  national origin, age, disability, sex, sexual orientation, or gender identity.            Thank you!     Thank you for choosing  [facility]  for your care. Our goal is always to provide you with excellent care. Hearing back from our patients is one way we can continue to improve our services. Please take a few minutes to complete the written survey that you may receive in the mail after your visit with us. Thank you!             Your Updated Medication List - Protect others around you: Learn how to safely use, store and throw away your medicines at www.disposemymeds.org.          This list is accurate as of 11/28/18 11:59 PM.  Always use your most recent med list.                   Brand Name Dispense Instructions for use Diagnosis    acetaminophen 500 MG tablet    TYLENOL     Take 650 mg by mouth every 6 hours as needed        aspirin 81 MG tablet    ASA     Take 81 mg by mouth daily        busPIRone 10 MG tablet    BUSPAR    180 tablet    Take 1 tablet (10 mg) by mouth 2 times daily    Anxiety       calcium carbonate 500 MG chewable tablet    TUMS     Take 1 chew tab by mouth every 4 hours as needed for heartburn        cetirizine 10 MG tablet    zyrTEC     Take 10 mg by mouth daily as needed for allergies        cyclobenzaprine 10 MG tablet    FLEXERIL    180 tablet    TAKE 1 TABLET(10 MG) BY MOUTH TWICE DAILY    Abnormal involuntary movement       dextromethorphan-guaiFENesin  MG per 12 hr tablet    MUCINEX DM     Take 1 tablet by mouth every 12 hours Patient takes at night        fish Oil 1200 MG capsule      Take by mouth daily Fish oil 1290 omega 3 900        FLOMAX 0.4 MG capsule   Generic drug:  tamsulosin      Take 1 capsule by mouth At Bedtime.        fluticasone 50 MCG/ACT nasal spray    FLONASE    3 Bottle    Spray 2 sprays into both nostrils daily    Chronic pansinusitis       omeprazole 20 MG tablet      Take 1 tablet by mouth 2 times daily.        * order for DME     1 each     Elastic Back brace with stays and velcro enclosure    Chronic midline low back pain without sciatica       * order for DME     1 each    Tens unit.    Lumbar paraspinal muscle spasm       * order for DME     1 each    Tens unit and supplies    Chronic bilateral low back pain with sciatica, sciatica laterality unspecified       senna-docusate 8.6-50 MG tablet    SENOKOT-S/PERICOLACE     Take 1 tablet by mouth daily        SIMETHICONE-80 PO      Take 80 mg by mouth every 4 hours as needed for intestinal gas        simvastatin 40 MG tablet    ZOCOR     Take 20 mg by mouth At Bedtime        temazepam 30 MG capsule    RESTORIL    15 capsule    Take 1 capsule (30 mg) by mouth nightly as needed    Other insomnia       traMADol 50 MG tablet    ULTRAM    90 tablet    Take 1 tablet (50 mg) by mouth every 8 hours as needed for pain    Bilateral low back pain with sciatica, sciatica laterality unspecified, unspecified chronicity       vitamin D3 2000 units Caps      Take by mouth daily        * Notice:  This list has 3 medication(s) that are the same as other medications prescribed for you. Read the directions carefully, and ask your doctor or other care provider to review them with you.

## 2018-11-28 NOTE — NURSING NOTE
"Chief Complaint   Patient presents with     Sinusitis     chronic sinusitis.  referred by Dr Bernal       Initial /70 (BP Location: Right arm, Patient Position: Sitting, Cuff Size: Adult Large)  Pulse 101  Temp 96.6  F (35.9  C) (Tympanic)  Ht 1.702 m (5' 7\")  Wt 90.7 kg (200 lb)  SpO2 95%  BMI 31.32 kg/m2 Estimated body mass index is 31.32 kg/(m^2) as calculated from the following:    Height as of this encounter: 1.702 m (5' 7\").    Weight as of this encounter: 90.7 kg (200 lb).  Medication Reconciliation: complete    Tabitha Gonzales LPN  "

## 2018-11-28 NOTE — LETTER
2018         RE: Kenyon Rivera  [address]  [place] [address]        Dear Colleague,    Thank you for referring your patient, Kenyon Rivera, to the [facility]. Please see a copy of my visit note below.    Otolaryngology Consultation    Patient: Kenyon Rivera  : 1934    Patient presents with:  Sinusitis: chronic sinusitis.  referred by Dr Bernal      HPI:  Kenyon Rivera is a 84 year old male seen today for CRS and drainage. Kenyon reports ongoing sinus and congestion troubles for about 25 years. He was able to manage at home w/o remedies, but does feel it is worsening.   He has left temple pressure, but is intermittent. Kenyon denies maxillary pain, but frontal region is painful at times.   Increase in post nasal drainage and needing to cough to expel secretion. He has been using a spray(but unsure which one)  and OTC sinus medication. In the past he tried Muccinex with some relief.     He has mild seasonal allergies. He uses Zyrtec PRN. 4-5 year he uses Zyrtec during spring- summer months.       He takes po medication for reflux BID. He does feel this controls his reflux. Water intake 3-4 glasses. Caffeine- 3 cups daily.   Denies dysphagia, dysphonia.   TECHNIQUE: CT scan of the sinuses with sagittal coronal  reconstructions     FINDINGS: There is some minimal mucosal thickening in the floor of the  right maxillary sinus. The left maxillary sinus is developmentally  small with mild mucosal thickening. There is mild mucosal thickening  in the ethmoid sinuses. There is mild mucosal thickening seen in the  sphenoid sinuses. Mild frontal mucosal thickening is noted. Nasal  septum is midline turbinates appear intact. The retropharyngeal soft  tissues are normal.          IMPRESSION: Mild mucosal thickening in all the sinuses.       Current Outpatient Rx   Medication Sig Dispense Refill     acetaminophen (TYLENOL) 500 MG tablet Take 650 mg by mouth every 6  hours as needed        aspirin 81 MG tablet Take 81 mg by mouth daily        busPIRone (BUSPAR) 10 MG tablet Take 1 tablet (10 mg) by mouth 2 times daily 180 tablet 3     calcium carbonate (TUMS) 500 MG chewable tablet Take 1 chew tab by mouth every 4 hours as needed for heartburn       cetirizine (ZYRTEC) 10 MG tablet Take 10 mg by mouth daily as needed for allergies       Cholecalciferol (VITAMIN D3) 2000 UNITS CAPS Take by mouth daily       cyclobenzaprine (FLEXERIL) 10 MG tablet TAKE 1 TABLET(10 MG) BY MOUTH TWICE DAILY 180 tablet 3     dextromethorphan-guaiFENesin (MUCINEX DM)  MG per 12 hr tablet Take 1 tablet by mouth every 12 hours Patient takes at night       Omega-3 Fatty Acids (FISH OIL) 1200 MG CAPS Take by mouth daily Fish oil 1290 omega 3 900       omeprazole 20 MG tablet Take 1 tablet by mouth 2 times daily.       order for DME Tens unit and supplies 1 each 11     order for DME Tens unit. 1 each 1     order for DME Elastic Back brace with stays and velcro enclosure 1 each 0     senna-docusate (SENOKOT-S;PERICOLACE) 8.6-50 MG per tablet Take 1 tablet by mouth daily       SIMETHICONE-80 PO Take 80 mg by mouth every 4 hours as needed for intestinal gas       simvastatin (ZOCOR) 40 MG tablet Take 20 mg by mouth At Bedtime       tamsulosin (FLOMAX) 0.4 MG 24 hr capsule Take 1 capsule by mouth At Bedtime.       temazepam (RESTORIL) 30 MG capsule Take 1 capsule (30 mg) by mouth nightly as needed 15 capsule 5     traMADol (ULTRAM) 50 MG tablet Take 1 tablet (50 mg) by mouth every 8 hours as needed for pain 90 tablet 0       Allergies: Chlorzoxazone     Past Medical History:   Diagnosis Date     Calculus of gallbladder with other cholecystitis, without mention of obstruction 11/18/2004     Displacement of lumbar intervertebral disc without myelopathy 11/17/2000     Hyperplasia of prostate 01/04/2000     Hypertension, Benign 07/11/2011     Insomnia, unspecified 03/17/2011     Lumbago 11/02/1999      "Nonallopathic lesion of cervical region, not elsewhere classified 12/19/2001     Nonallopathic lesion of lumbar region, not elsewhere classified 03/05/2003     Nonallopathic lesion of thoracic region, not elsewhere classified 11/17/2000     Other and unspecified hyperlipidemia 12/14/1999     Other diseases of respiratory system, not elsewhere classified 04/21/2004     Other malaise and fatigue 04/05/2006     Pneumonia, organism unspecified(486) 12/13/1999       Past Surgical History:   Procedure Laterality Date     APPENDECTOMY       cataract extraction and lens implantation  3/2007    LEFT     CHOLECYSTECTOMY       electric stimulator implant       excision of Dupuytren's contracture extending into ring finger  02/08/2008    Bilateral     inguinal herniography      LEFT     NEPHRECTOMY  1992    RIGHT > Renal Cancer     OPEN REDUCTION INTERNAL FIXATION ANKLE      RIGHT     penile implant       TURP         ENT family history reviewed    Social History   Substance Use Topics     Smoking status: Former Smoker     Packs/day: 2.00     Years: 26.00     Types: Cigarettes     Start date: 1/1/1949     Quit date: 1/30/1975     Smokeless tobacco: Never Used      Comment: Tried to Quit (YES)     Alcohol use Yes      Comment: RARELY       Review of Systems  ROS: 10 point ROS neg other than the symptoms noted above in the HPI    Physical Exam  /70 (BP Location: Right arm, Patient Position: Sitting, Cuff Size: Adult Large)  Pulse 101  Temp 96.6  F (35.9  C) (Tympanic)  Ht 1.702 m (5' 7\")  Wt 90.7 kg (200 lb)  SpO2 95%  BMI 31.32 kg/m2  General - The patient is well nourished and well developed, and appears to have good nutritional status.  Alert and oriented to person and place, answers questions and cooperates with examination appropriately.   Head and Face - Normocephalic and atraumatic, with no gross asymmetry noted.  The facial nerve is intact, with strong symmetric movements.  Voice and Breathing - The patient " was breathing comfortably without the use of accessory muscles. There was no wheezing, stridor, or stertor.  The patients voice was clear and strong, and had appropriate pitch and quality.  Ears -The external auditory canals are patent, the tympanic membranes are intact without effusion, retraction or mass.  Bony landmarks are intact.  Eyes - Extraocular movements intact, and the pupils were reactive to light.  Sclera were not icteric or injected, conjunctiva were pink and moist.  Mouth - Examination of the oral cavity showed pink, healthy oral mucosa. No lesions or ulcerations noted.  The tongue was mobile and midline, and the dentition were dentures. No oral sores,lesions.    Throat - The walls of the oropharynx were smooth, pink, moist, symmetric, and had no lesions or ulcerations.  The tonsillar pillars and soft palate were symmetric.  The uvula was midline on elevation.    Neck - Normal midline excursion of the laryngotracheal complex during swallowing.  Full range of motion on passive movement.  Palpation of the occipital, submental, submandibular, internal jugular chain, and supraclavicular nodes did not demonstrate any abnormal lymph nodes or masses.  Palpation of the thyroid was soft and smooth, with no nodules or goiter appreciated.  The trachea was mobile and midline.  Nose - External contour is symmetric, no gross deflection or scars.  Nasal mucosa is pink and moist with no abnormal mucus.  The septum and turbinates were evaluated:   No polyps, masses, or purulence noted on examination.    To evaluate the nose and sinuses, I performed rigid nasal endoscopy. The LPN had previously sprayed both nares with lidocaine and neosynephrine.    I began with the LEFT side using a 0 degree rigid nasal endoscope, and then similarly examined the RIGHT side    Findings:  Septum- Central bone spur to right, o/w midline  Inferior turbinates:  Mild hypertrophy. Non obstructive  Middle turbinate and middle meatus:  No  purulence, no polyposis, no synechiae  Mucosa is  healthy throughout without polyps nor polypoid degeneration  NP is clear. ET patent, used flex to viz right ET.     ASSESSMENT:    ICD-10-CM    1. Chronic rhinitis J31.0    2. Post-nasal drainage R09.82    3. Chronic pansinusitis J32.4 fluticasone (FLONASE) 50 MCG/ACT nasal spray     DISCONTINUED: fluticasone (FLONASE) 50 MCG/ACT nasal spray     Discussed with Kenyon, he does have post nasal drainage/ phlegm secretions. Will try jaclyn med rinse and Flonase. Discussed side effects of nasal sprays at this time and he wishes to try.   Hopefully he will see some degree of relied with his symptoms.   Reviewed with him good oral cares, water intake, etc.   Kenyon may be noticing more drainage as it has changed. Recommended above as a trial or may consider AH. Reviewed office procedure reviewed for sinus dilations, however, overall he is asymptomatic     Reviewed Sinus CT with patient. Mild/ minimal changes noted throughout sinuses. Will work w/ management vs. Surgical. He agrees with this plan    Return to clinic in 4-6 weeks for recheck.         Nichole Rodriguez PA-C  ENT  [facility], [place]  [phone]        Again, thank you for allowing me to participate in the care of your patient.        Sincerely,        Nichole Rodriguez PA-C

## 2018-11-28 NOTE — PATIENT INSTRUCTIONS
Start Tito med rinse.   Rinse 1-2 times daily. Follow instructions.     -Obtain Tito Med Sinus rinse over the counter.    -Use warm distilled water and 2 packets of the salt solution that comes with the bottle, dissolve in bottle up to the 240 mL kar.  -Irrigate each side of your nose leaning over the sink, using 1/3 to 1/2 the volume of the bottle in each nostril every irrigation.  Irrigate 2 times daily.    Start Flonase 2 sprays to each nostril daily    Good oral cares.   Maintain good water intake.     Thank you for allowing [facility] and our ENT team to participate in your care.  If your medications are too expensive, please give the nurse a call.  We can possibly change this medication.  If you have a scheduling or an appointment question please contact our Health Unit Coordinator at their direct line [phone].   ALL nursing questions or concerns can be directed to your ENT nurse at: [phone] [facility]

## 2018-11-29 ENCOUNTER — HOSPITAL ENCOUNTER (OUTPATIENT)
Dept: PHYSICAL THERAPY | Facility: HOSPITAL | Age: 83
Setting detail: THERAPIES SERIES
End: 2018-11-29
Attending: FAMILY MEDICINE
Payer: OTHER MISCELLANEOUS

## 2018-11-29 PROCEDURE — 40000718 ZZHC STATISTIC PT DEPARTMENT ORTHO VISIT

## 2018-11-29 PROCEDURE — 97012 MECHANICAL TRACTION THERAPY: CPT | Mod: GP

## 2018-12-04 ENCOUNTER — HOSPITAL ENCOUNTER (OUTPATIENT)
Dept: PHYSICAL THERAPY | Facility: HOSPITAL | Age: 83
Setting detail: THERAPIES SERIES
End: 2018-12-04
Attending: FAMILY MEDICINE
Payer: OTHER MISCELLANEOUS

## 2018-12-04 PROCEDURE — 97012 MECHANICAL TRACTION THERAPY: CPT | Mod: GP

## 2018-12-04 PROCEDURE — 40000718 ZZHC STATISTIC PT DEPARTMENT ORTHO VISIT

## 2018-12-06 ENCOUNTER — HOSPITAL ENCOUNTER (OUTPATIENT)
Dept: PHYSICAL THERAPY | Facility: HOSPITAL | Age: 83
Setting detail: THERAPIES SERIES
End: 2018-12-06
Attending: FAMILY MEDICINE
Payer: OTHER MISCELLANEOUS

## 2018-12-06 PROCEDURE — 97012 MECHANICAL TRACTION THERAPY: CPT | Mod: GP

## 2018-12-06 PROCEDURE — 40000718 ZZHC STATISTIC PT DEPARTMENT ORTHO VISIT

## 2019-01-02 ENCOUNTER — OFFICE VISIT (OUTPATIENT)
Dept: OTOLARYNGOLOGY | Facility: OTHER | Age: 84
End: 2019-01-02
Attending: PHYSICIAN ASSISTANT
Payer: MEDICARE

## 2019-01-02 VITALS
SYSTOLIC BLOOD PRESSURE: 138 MMHG | OXYGEN SATURATION: 93 % | BODY MASS INDEX: 32.02 KG/M2 | WEIGHT: 204 LBS | HEIGHT: 67 IN | TEMPERATURE: 97.2 F | DIASTOLIC BLOOD PRESSURE: 80 MMHG | HEART RATE: 103 BPM

## 2019-01-02 DIAGNOSIS — K21.9 GASTROESOPHAGEAL REFLUX DISEASE, ESOPHAGITIS PRESENCE NOT SPECIFIED: ICD-10-CM

## 2019-01-02 DIAGNOSIS — J31.0 CHRONIC RHINITIS: Primary | ICD-10-CM

## 2019-01-02 DIAGNOSIS — J32.4 CHRONIC PANSINUSITIS: ICD-10-CM

## 2019-01-02 DIAGNOSIS — M54.40 BILATERAL LOW BACK PAIN WITH SCIATICA, SCIATICA LATERALITY UNSPECIFIED, UNSPECIFIED CHRONICITY: ICD-10-CM

## 2019-01-02 DIAGNOSIS — R09.A2 GLOBUS SENSATION: ICD-10-CM

## 2019-01-02 DIAGNOSIS — R09.82 POST-NASAL DRAINAGE: ICD-10-CM

## 2019-01-02 PROCEDURE — G0463 HOSPITAL OUTPT CLINIC VISIT: HCPCS

## 2019-01-02 PROCEDURE — 99214 OFFICE O/P EST MOD 30 MIN: CPT | Mod: 25 | Performed by: PHYSICIAN ASSISTANT

## 2019-01-02 PROCEDURE — 31575 DIAGNOSTIC LARYNGOSCOPY: CPT | Performed by: PHYSICIAN ASSISTANT

## 2019-01-02 ASSESSMENT — MIFFLIN-ST. JEOR: SCORE: 1573.97

## 2019-01-02 ASSESSMENT — PAIN SCALES - GENERAL: PAINLEVEL: NO PAIN (1)

## 2019-01-02 NOTE — PROGRESS NOTES
Chief Complaint   Patient presents with     Sinus Problem     Pt is here for a f/u PND, chronic rhinitis and chronic pansinusitis.       Kenyon returns to ENT for repeat exam. He was last seen on 11/28/18 for complaints of rhinitis, post nasal drainage. He was started on rinses and nasal sprays.   He has noticed that he has improvement with his symptoms.     He has tried rinsing twice a day and uses it once a day PRN.   He has been noticing symptoms are getting a little drainage. No concerns w/ nasal congestion. He has sensation of drainage or a lump in his throat.   He had wished to use medication management for control before proceeding w/ any surgical intervention.   Denies side effects from nasal spray or rinses.     He takes po medication for reflux BID. He does feel this controls his reflux. Water intake 3-4 glasses. Caffeine- 3 cups daily.   He feels reflux all the way into this throat. He does awake w/ reflux.He is taking Prilosec 20 mg BID. He has felt increase in reflux.  Mild sore throat. He has sensation of food getting caught.   Denies  Dysphonia.   He did use tobacco.   He has no fevers, night sweats or weight loss.     TECHNIQUE: CT scan of the sinuses with sagittal coronal  reconstructions     FINDINGS: There is some minimal mucosal thickening in the floor of the  right maxillary sinus. The left maxillary sinus is developmentally  small with mild mucosal thickening. There is mild mucosal thickening  in the ethmoid sinuses. There is mild mucosal thickening seen in the  sphenoid sinuses. Mild frontal mucosal thickening is noted. Nasal  septum is midline turbinates appear intact. The retropharyngeal soft  tissues are normal.     IMPRESSION: Mild mucosal thickening in all the sinuses.    Past Medical History:   Diagnosis Date     Calculus of gallbladder with other cholecystitis, without mention of obstruction 11/18/2004     Displacement of lumbar intervertebral disc without myelopathy 11/17/2000      "Hyperplasia of prostate 01/04/2000     Hypertension, Benign 07/11/2011     Insomnia, unspecified 03/17/2011     Lumbago 11/02/1999     Nonallopathic lesion of cervical region, not elsewhere classified 12/19/2001     Nonallopathic lesion of lumbar region, not elsewhere classified 03/05/2003     Nonallopathic lesion of thoracic region, not elsewhere classified 11/17/2000     Other and unspecified hyperlipidemia 12/14/1999     Other diseases of respiratory system, not elsewhere classified 04/21/2004     Other malaise and fatigue 04/05/2006     Pneumonia, organism unspecified(486) 12/13/1999        Allergies   Allergen Reactions     Chlorzoxazone      Parafon Forte     Current Outpatient Medications   Medication     acetaminophen (TYLENOL) 500 MG tablet     aspirin 81 MG tablet     busPIRone (BUSPAR) 10 MG tablet     calcium carbonate (TUMS) 500 MG chewable tablet     cetirizine (ZYRTEC) 10 MG tablet     Cholecalciferol (VITAMIN D3) 2000 UNITS CAPS     cyclobenzaprine (FLEXERIL) 10 MG tablet     dextromethorphan-guaiFENesin (MUCINEX DM)  MG per 12 hr tablet     fluticasone (FLONASE) 50 MCG/ACT nasal spray     Omega-3 Fatty Acids (FISH OIL) 1200 MG CAPS     omeprazole 20 MG tablet     order for DME     order for DME     order for DME     senna-docusate (SENOKOT-S;PERICOLACE) 8.6-50 MG per tablet     SIMETHICONE-80 PO     simvastatin (ZOCOR) 40 MG tablet     tamsulosin (FLOMAX) 0.4 MG 24 hr capsule     temazepam (RESTORIL) 30 MG capsule     traMADol (ULTRAM) 50 MG tablet     No current facility-administered medications for this visit.         ROS: 10 point ROS neg other than the symptoms noted above in the HPI.  /80 (BP Location: Right arm, Cuff Size: Adult Regular)   Pulse 103   Temp 97.2  F (36.2  C) (Tympanic)   Ht 1.702 m (5' 7\")   Wt 92.5 kg (204 lb)   SpO2 93%   BMI 31.95 kg/m      General - The patient is well nourished and well developed, and appears to have good nutritional status.  Alert and " oriented to person and place, answers questions and cooperates with examination appropriately.   Head and Face - Normocephalic and atraumatic, with no gross asymmetry noted.  The facial nerve is intact, with strong symmetric movements.  Voice and Breathing - The patient was breathing comfortably without the use of accessory muscles. There was no wheezing, stridor, or stertor.  The patients voice was clear and strong, and had appropriate pitch and quality.  Ears -The external auditory canals are patent, the tympanic membranes are intact without effusion, retraction or mass.  Bony landmarks are intact.  Eyes - Extraocular movements intact, and the pupils were reactive to light.  Sclera were not icteric or injected, conjunctiva were pink and moist.  Mouth - Examination of the oral cavity showed pink, healthy oral mucosa. No lesions or ulcerations noted.  The tongue was mobile and midline, and the dentition were dentures. No oral sores,lesions.    Throat - The walls of the oropharynx were smooth, pink, moist, symmetric, and had no lesions or ulcerations.  The tonsillar pillars and soft palate were symmetric.  The uvula was midline on elevation.    Neck - Normal midline excursion of the laryngotracheal complex during swallowing.  Full range of motion on passive movement.  Palpation of the occipital, submental, submandibular, internal jugular chain, and supraclavicular nodes did not demonstrate any abnormal lymph nodes or masses.  Palpation of the thyroid was soft and smooth, with no nodules or goiter appreciated.  The trachea was mobile and midline.  Nose - External contour is symmetric, no gross deflection or scars.  Nasal mucosa is pink and moist with no abnormal mucus.  The septum and turbinates were evaluated:   No polyps, masses, or purulence noted on examination.    Flexible Endoscopy -    Attempts at mirror laryngoscopy were not possible due to gag reflex.  Therefore I proceeded with a fiberoptic examination.   First I sprayed both sides of the nose with a mixture of lidocaine and neosynephrine.  I then passed the scope through the nasal cavity.  The nasal cavity was unremarkable.  The nasopharynx was mucosally covered and symmetric.  The Eustachian tube openings were unobstructed.  Going further down I had a clear view of the base of tongue, which had normal appearing lingual tonsillar tissue.  The base of tongue was free of lesions, and the vallecula was open.  The epiglottis was smooth and mucosally covered.  The supraglottic larynx was then clearly visualized.  The vocal cords moved smoothly and symmetrically, they were slightly edematous but pearly white and no lesions were seen.  I did note a significant amount of edema and redundant mucosa in the interarytenoid soft tissues and posterior surface of the larynx.  The pyriform sinuses were open, and the limited view of the postcricoid region did not show any erosive or mass lesions.        ASSESSMENT:    ICD-10-CM    1. Chronic rhinitis J31.0    2. Post-nasal drainage R09.82    3. Chronic pansinusitis J32.4    4. Globus sensation F45.8 ranitidine (ZANTAC) 300 MG tablet   5. Gastroesophageal reflux disease, esophagitis presence not specified K21.9 ranitidine (ZANTAC) 300 MG tablet       Continue with Tito med rinse.   Rinse 1-2 times daily/ as needed  Continue with Flonase 2 sprays to each nostril daily    Increase Prilosec to 40 mg in the AM and start Zantac at night  Follow LPR precautions.   Follow up in 6 weeks      Adult lifestyle changes to prevent LPR reviewed      Avoid eating and drinking within two to three hours prior to bedtime    Do not drink alcohol    Eat small meals and slowly    Limit problem foods:    o Caffeine  o Carbonated drinks  o Chocolate  o Peppermint  o Tomato  o Citrus fruits  o Fatty and fried foods      Lose weight    Quit smoking    Wear loose clothing      Nichole Rodriguez PA-C  ENT  [facility], [place]  [phone]         .

## 2019-01-02 NOTE — NURSING NOTE
"Chief Complaint   Patient presents with     Sinus Problem     Pt is here for a f/u PND, chronic rhinitis and chronic pansinusitis.       Initial /80 (BP Location: Right arm, Cuff Size: Adult Regular)   Pulse 103   Temp 97.2  F (36.2  C) (Tympanic)   Ht 1.702 m (5' 7\")   Wt 92.5 kg (204 lb)   SpO2 93%   BMI 31.95 kg/m   Estimated body mass index is 31.95 kg/m  as calculated from the following:    Height as of this encounter: 1.702 m (5' 7\").    Weight as of this encounter: 92.5 kg (204 lb).  Medication Reconciliation: complete    Airam Lindsay LPN    "

## 2019-01-02 NOTE — LETTER
1/2/2019         RE: Kenyon Rivera  [address]  [place] [address]        Dear Colleague,    Thank you for referring your patient, Kenyon Rivera, to the [facility]. Please see a copy of my visit note below.    Chief Complaint   Patient presents with     Sinus Problem     Pt is here for a f/u PND, chronic rhinitis and chronic pansinusitis.       Kenyon returns to ENT for repeat exam. He was last seen on 11/28/18 for complaints of rhinitis, post nasal drainage. He was started on rinses and nasal sprays.   He has noticed that he has improvement with his symptoms.     He has tried rinsing twice a day and uses it once a day PRN.   He has been noticing symptoms are getting a little drainage. No concerns w/ nasal congestion. He has sensation of drainage or a lump in his throat.   He had wished to use medication management for control before proceeding w/ any surgical intervention.   Denies side effects from nasal spray or rinses.     He takes po medication for reflux BID. He does feel this controls his reflux. Water intake 3-4 glasses. Caffeine- 3 cups daily.   He feels reflux all the way into this throat. He does awake w/ reflux.He is taking Prilosec 20 mg BID. He has felt increase in reflux.  Mild sore throat. He has sensation of food getting caught.   Denies  Dysphonia.   He did use tobacco.   He has no fevers, night sweats or weight loss.     TECHNIQUE: CT scan of the sinuses with sagittal coronal  reconstructions     FINDINGS: There is some minimal mucosal thickening in the floor of the  right maxillary sinus. The left maxillary sinus is developmentally  small with mild mucosal thickening. There is mild mucosal thickening  in the ethmoid sinuses. There is mild mucosal thickening seen in the  sphenoid sinuses. Mild frontal mucosal thickening is noted. Nasal  septum is midline turbinates appear intact. The retropharyngeal soft  tissues are normal.     IMPRESSION: Mild mucosal  thickening in all the sinuses.    Past Medical History:   Diagnosis Date     Calculus of gallbladder with other cholecystitis, without mention of obstruction 11/18/2004     Displacement of lumbar intervertebral disc without myelopathy 11/17/2000     Hyperplasia of prostate 01/04/2000     Hypertension, Benign 07/11/2011     Insomnia, unspecified 03/17/2011     Lumbago 11/02/1999     Nonallopathic lesion of cervical region, not elsewhere classified 12/19/2001     Nonallopathic lesion of lumbar region, not elsewhere classified 03/05/2003     Nonallopathic lesion of thoracic region, not elsewhere classified 11/17/2000     Other and unspecified hyperlipidemia 12/14/1999     Other diseases of respiratory system, not elsewhere classified 04/21/2004     Other malaise and fatigue 04/05/2006     Pneumonia, organism unspecified(486) 12/13/1999        Allergies   Allergen Reactions     Chlorzoxazone      Parafon Forte     Current Outpatient Medications   Medication     acetaminophen (TYLENOL) 500 MG tablet     aspirin 81 MG tablet     busPIRone (BUSPAR) 10 MG tablet     calcium carbonate (TUMS) 500 MG chewable tablet     cetirizine (ZYRTEC) 10 MG tablet     Cholecalciferol (VITAMIN D3) 2000 UNITS CAPS     cyclobenzaprine (FLEXERIL) 10 MG tablet     dextromethorphan-guaiFENesin (MUCINEX DM)  MG per 12 hr tablet     fluticasone (FLONASE) 50 MCG/ACT nasal spray     Omega-3 Fatty Acids (FISH OIL) 1200 MG CAPS     omeprazole 20 MG tablet     order for DME     order for DME     order for DME     senna-docusate (SENOKOT-S;PERICOLACE) 8.6-50 MG per tablet     SIMETHICONE-80 PO     simvastatin (ZOCOR) 40 MG tablet     tamsulosin (FLOMAX) 0.4 MG 24 hr capsule     temazepam (RESTORIL) 30 MG capsule     traMADol (ULTRAM) 50 MG tablet     No current facility-administered medications for this visit.         ROS: 10 point ROS neg other than the symptoms noted above in the HPI.  /80 (BP Location: Right arm, Cuff Size: Adult  "Regular)   Pulse 103   Temp 97.2  F (36.2  C) (Tympanic)   Ht 1.702 m (5' 7\")   Wt 92.5 kg (204 lb)   SpO2 93%   BMI 31.95 kg/m       General - The patient is well nourished and well developed, and appears to have good nutritional status.  Alert and oriented to person and place, answers questions and cooperates with examination appropriately.   Head and Face - Normocephalic and atraumatic, with no gross asymmetry noted.  The facial nerve is intact, with strong symmetric movements.  Voice and Breathing - The patient was breathing comfortably without the use of accessory muscles. There was no wheezing, stridor, or stertor.  The patients voice was clear and strong, and had appropriate pitch and quality.  Ears -The external auditory canals are patent, the tympanic membranes are intact without effusion, retraction or mass.  Bony landmarks are intact.  Eyes - Extraocular movements intact, and the pupils were reactive to light.  Sclera were not icteric or injected, conjunctiva were pink and moist.  Mouth - Examination of the oral cavity showed pink, healthy oral mucosa. No lesions or ulcerations noted.  The tongue was mobile and midline, and the dentition were dentures. No oral sores,lesions.    Throat - The walls of the oropharynx were smooth, pink, moist, symmetric, and had no lesions or ulcerations.  The tonsillar pillars and soft palate were symmetric.  The uvula was midline on elevation.    Neck - Normal midline excursion of the laryngotracheal complex during swallowing.  Full range of motion on passive movement.  Palpation of the occipital, submental, submandibular, internal jugular chain, and supraclavicular nodes did not demonstrate any abnormal lymph nodes or masses.  Palpation of the thyroid was soft and smooth, with no nodules or goiter appreciated.  The trachea was mobile and midline.  Nose - External contour is symmetric, no gross deflection or scars.  Nasal mucosa is pink and moist with no abnormal " mucus.  The septum and turbinates were evaluated:   No polyps, masses, or purulence noted on examination.    Flexible Endoscopy -    Attempts at mirror laryngoscopy were not possible due to gag reflex.  Therefore I proceeded with a fiberoptic examination.  First I sprayed both sides of the nose with a mixture of lidocaine and neosynephrine.  I then passed the scope through the nasal cavity.  The nasal cavity was unremarkable.  The nasopharynx was mucosally covered and symmetric.  The Eustachian tube openings were unobstructed.  Going further down I had a clear view of the base of tongue, which had normal appearing lingual tonsillar tissue.  The base of tongue was free of lesions, and the vallecula was open.  The epiglottis was smooth and mucosally covered.  The supraglottic larynx was then clearly visualized.  The vocal cords moved smoothly and symmetrically, they were slightly edematous but pearly white and no lesions were seen.  I did note a significant amount of edema and redundant mucosa in the interarytenoid soft tissues and posterior surface of the larynx.  The pyriform sinuses were open, and the limited view of the postcricoid region did not show any erosive or mass lesions.        ASSESSMENT:    ICD-10-CM    1. Chronic rhinitis J31.0    2. Post-nasal drainage R09.82    3. Chronic pansinusitis J32.4    4. Globus sensation F45.8 ranitidine (ZANTAC) 300 MG tablet   5. Gastroesophageal reflux disease, esophagitis presence not specified K21.9 ranitidine (ZANTAC) 300 MG tablet       Continue with Tito med rinse.   Rinse 1-2 times daily/ as needed  Continue with Flonase 2 sprays to each nostril daily    Increase Prilosec to 40 mg in the AM and start Zantac at night  Follow LPR precautions.   Follow up in 6 weeks      Adult lifestyle changes to prevent LPR reviewed      Avoid eating and drinking within two to three hours prior to bedtime    Do not drink alcohol    Eat small meals and slowly    Limit problem  foods:    o Caffeine  o Carbonated drinks  o Chocolate  o Peppermint  o Tomato  o Citrus fruits  o Fatty and fried foods      Lose weight    Quit smoking    Wear loose clothing      Nichole Rodriguez PA-C  [facility] [place]  [phone]         .      Again, thank you for allowing me to participate in the care of your patient.        Sincerely,        Nichole Rodriguez PA-C

## 2019-01-02 NOTE — PATIENT INSTRUCTIONS
Continue with Tito med rinse.   Rinse 1-2 times daily/ as needed  Continue with Flonase 2 sprays to each nostril daily    Increase Prilosec to 40 mg in the AM and start Zantac at night  Follow LPR precautions.   Follow up in 6 weeks    Adult lifestyle changes to prevent LPR reviewed      Avoid eating and drinking within two to three hours prior to bedtime    Do not drink alcohol    Eat small meals and slowly    Limit problem foods:    o Caffeine  o Carbonated drinks  o Chocolate  o Peppermint  o Tomato  o Citrus fruits  o Fatty and fried foods      Lose weight    Quit smoking    Wear loose clothing    Thank you for allowing [facility] and our ENT team to participate in your care.  If your medications are too expensive, please give the nurse a call.  We can possibly change this medication.  If you have a scheduling or an appointment question please contact our Health Unit Coordinator at their direct line [phone].   ALL nursing questions or concerns can be directed to your ENT nurse at: [phone] [place]

## 2019-01-02 NOTE — TELEPHONE ENCOUNTER
Tramadol      Last Written Prescription Date:  11/21/18  Last Fill Quantity: 90,   # refills: 0  Last Office Visit: 11/7/18  Future Office visit:    Next 5 appointments (look out 90 days)    Jan 02, 2019  1:15 PM CST  (Arrive by 1:00 PM)  Return Visit with Nichole Rodriguez PA-C  [facility] ([facility] ) [address]  [place] [address]  [phone]           Routing refill request to provider for review/approval because:

## 2019-01-04 RX ORDER — TRAMADOL HYDROCHLORIDE 50 MG/1
50 TABLET ORAL EVERY 8 HOURS PRN
Qty: 90 TABLET | Refills: 0 | Status: SHIPPED | OUTPATIENT
Start: 2019-01-04 | End: 2019-02-26

## 2019-02-07 ENCOUNTER — TELEPHONE (OUTPATIENT)
Dept: OTOLARYNGOLOGY | Facility: OTHER | Age: 84
End: 2019-02-07

## 2019-02-07 DIAGNOSIS — J32.4 CHRONIC PANSINUSITIS: ICD-10-CM

## 2019-02-07 RX ORDER — FLUTICASONE PROPIONATE 50 MCG
2 SPRAY, SUSPENSION (ML) NASAL DAILY
Qty: 3 BOTTLE | Refills: 11 | Status: SHIPPED | OUTPATIENT
Start: 2019-02-07

## 2019-02-07 NOTE — TELEPHONE ENCOUNTER
Pt called and states that [facility].  He got his other medications, but not this.

## 2019-02-15 ENCOUNTER — OFFICE VISIT (OUTPATIENT)
Dept: OTOLARYNGOLOGY | Facility: OTHER | Age: 84
End: 2019-02-15
Attending: PHYSICIAN ASSISTANT
Payer: COMMERCIAL

## 2019-02-15 VITALS
BODY MASS INDEX: 31.39 KG/M2 | SYSTOLIC BLOOD PRESSURE: 134 MMHG | HEIGHT: 67 IN | WEIGHT: 200 LBS | OXYGEN SATURATION: 94 % | RESPIRATION RATE: 20 BRPM | TEMPERATURE: 97.6 F | DIASTOLIC BLOOD PRESSURE: 62 MMHG | HEART RATE: 101 BPM

## 2019-02-15 DIAGNOSIS — K21.9 GASTROESOPHAGEAL REFLUX DISEASE, ESOPHAGITIS PRESENCE NOT SPECIFIED: ICD-10-CM

## 2019-02-15 DIAGNOSIS — F41.9 ANXIETY: ICD-10-CM

## 2019-02-15 DIAGNOSIS — R09.A2 GLOBUS SENSATION: ICD-10-CM

## 2019-02-15 DIAGNOSIS — J31.0 CHRONIC RHINITIS: ICD-10-CM

## 2019-02-15 DIAGNOSIS — R09.82 POST-NASAL DRAINAGE: ICD-10-CM

## 2019-02-15 DIAGNOSIS — J32.4 CHRONIC PANSINUSITIS: Primary | ICD-10-CM

## 2019-02-15 PROCEDURE — G0463 HOSPITAL OUTPT CLINIC VISIT: HCPCS | Performed by: PHYSICIAN ASSISTANT

## 2019-02-15 PROCEDURE — 31231 NASAL ENDOSCOPY DX: CPT | Performed by: PHYSICIAN ASSISTANT

## 2019-02-15 PROCEDURE — 99214 OFFICE O/P EST MOD 30 MIN: CPT | Performed by: PHYSICIAN ASSISTANT

## 2019-02-15 RX ORDER — BUSPIRONE HYDROCHLORIDE 10 MG/1
10 TABLET ORAL 2 TIMES DAILY
Qty: 60 TABLET | Refills: 0 | Status: SHIPPED | OUTPATIENT
Start: 2019-02-15

## 2019-02-15 ASSESSMENT — MIFFLIN-ST. JEOR: SCORE: 1555.82

## 2019-02-15 ASSESSMENT — PAIN SCALES - GENERAL: PAINLEVEL: NO PAIN (0)

## 2019-02-15 NOTE — PROGRESS NOTES
Chief Complaint   Patient presents with     Sinus Problem     Pt is here for a f/u chronic rhinitis, post nasal drip and chronic pansinusitis.     Throat Problem     Pt is here for a f/u globus sensation and GERD.  Pt was placed on Zantac.     Kenyon returns to ENT for repeat exam. He returns for a recheck of his sinuses and throat. His throat/ reflux symptoms are reduced and notes improvement.   He does continue to have mild nasal congestion despite use of rinse, Flonase.     He has tried rinsing twice a day and uses it once a day PRN.   He had wished to use medication management for control before proceeding w/ any surgical intervention.       Water intake has increased.   Increased Prilosec to 40 mg in AM and Zantac at HS. He has been doing well. Reports slowly improving with his throat symptoms. He has no complaints of reflux. Denies burning in his mouth. He has some reflux remaining, but improvement overall.   Mild sore throat. He has sensation of food getting caught.   Denies  Dysphonia.   He did use tobacco.   He has no fevers, night sweats or weight loss.      TECHNIQUE: CT scan of the sinuses with sagittal coronal  reconstructions     FINDINGS: There is some minimal mucosal thickening in the floor of the  right maxillary sinus. The left maxillary sinus is developmentally  small with mild mucosal thickening. There is mild mucosal thickening  in the ethmoid sinuses. There is mild mucosal thickening seen in the  sphenoid sinuses. Mild frontal mucosal thickening is noted. Nasal  septum is midline turbinates appear intact. The retropharyngeal soft  tissues are normal.     IMPRESSION: Mild mucosal thickening in all the sinuses.       Past Medical History:   Diagnosis Date     Calculus of gallbladder with other cholecystitis, without mention of obstruction 11/18/2004     Displacement of lumbar intervertebral disc without myelopathy 11/17/2000     Hyperplasia of prostate 01/04/2000     Hypertension, Benign  "07/11/2011     Insomnia, unspecified 03/17/2011     Lumbago 11/02/1999     Nonallopathic lesion of cervical region, not elsewhere classified 12/19/2001     Nonallopathic lesion of lumbar region, not elsewhere classified 03/05/2003     Nonallopathic lesion of thoracic region, not elsewhere classified 11/17/2000     Other and unspecified hyperlipidemia 12/14/1999     Other diseases of respiratory system, not elsewhere classified 04/21/2004     Other malaise and fatigue 04/05/2006     Pneumonia, organism unspecified(486) 12/13/1999        Allergies   Allergen Reactions     Chlorzoxazone      Parafon Forte     Current Outpatient Medications   Medication     acetaminophen (TYLENOL) 500 MG tablet     aspirin 81 MG tablet     busPIRone (BUSPAR) 10 MG tablet     calcium carbonate (TUMS) 500 MG chewable tablet     cetirizine (ZYRTEC) 10 MG tablet     Cholecalciferol (VITAMIN D3) 2000 UNITS CAPS     cyclobenzaprine (FLEXERIL) 10 MG tablet     dextromethorphan-guaiFENesin (MUCINEX DM)  MG per 12 hr tablet     fluticasone (FLONASE) 50 MCG/ACT nasal spray     Omega-3 Fatty Acids (FISH OIL) 1200 MG CAPS     omeprazole 20 MG tablet     order for DME     order for DME     order for DME     ranitidine (ZANTAC) 300 MG tablet     senna-docusate (SENOKOT-S;PERICOLACE) 8.6-50 MG per tablet     SIMETHICONE-80 PO     simvastatin (ZOCOR) 40 MG tablet     tamsulosin (FLOMAX) 0.4 MG 24 hr capsule     temazepam (RESTORIL) 30 MG capsule     traMADol (ULTRAM) 50 MG tablet     No current facility-administered medications for this visit.       ROS: 10 point ROS neg other than the symptoms noted above in the HPI.  /62 (BP Location: Right arm, Patient Position: Chair, Cuff Size: Adult Regular)   Pulse 101   Temp 97.6  F (36.4  C) (Tympanic)   Resp 20   Ht 1.702 m (5' 7\")   Wt 90.7 kg (200 lb)   SpO2 94%   BMI 31.32 kg/m    General - The patient is well nourished and well developed, and appears to have good nutritional status. "  Alert and oriented to person and place, answers questions and cooperates with examination appropriately.   Head and Face - Normocephalic and atraumatic, with no gross asymmetry noted.  The facial nerve is intact, with strong symmetric movements.  Voice and Breathing - The patient was breathing comfortably without the use of accessory muscles. There was no wheezing, stridor, or stertor.  The patients voice was clear and strong, and had appropriate pitch and quality.  Ears -The external auditory canals are patent, the tympanic membranes are intact without effusion, retraction or mass.  Bony landmarks are intact.  Eyes - Extraocular movements intact, and the pupils were reactive to light.  Sclera were not icteric or injected, conjunctiva were pink and moist.  Mouth - Examination of the oral cavity showed pink, healthy oral mucosa. No lesions or ulcerations noted.  The tongue was mobile and midline, and the dentition were dentures. No oral sores,lesions.    Throat - The walls of the oropharynx were smooth, pink, moist, symmetric, and had no lesions or ulcerations.  The tonsillar pillars and soft palate were symmetric.  The uvula was midline on elevation.    Neck - Normal midline excursion of the laryngotracheal complex during swallowing.  Full range of motion on passive movement.  Palpation of the occipital, submental, submandibular, internal jugular chain, and supraclavicular nodes did not demonstrate any abnormal lymph nodes or masses.  Palpation of the thyroid was soft and smooth, with no nodules or goiter appreciated.  The trachea was mobile and midline.  Nose - External contour is symmetric, no gross deflection or scars.  Nasal mucosa is pink and moist with no abnormal mucus.  The septum and turbinates were evaluated:   No polyps, masses, or purulence noted on examination.      ASSESSMENT:    ICD-10-CM    1. Chronic pansinusitis J32.4    2. Chronic rhinitis J31.0    3. Post-nasal drainage R09.82    4. Globus  sensation F45.8    5. Gastroesophageal reflux disease, esophagitis presence not specified K21.9    6. Anxiety F41.9 busPIRone (BUSPAR) 10 MG tablet       Continue with Prilosec, Zantac. He has noted improvement since the additional medication.   Use daily for another 2 months, then will discontinue Zantac.   Follow LPR precautions. He is doing well with and reports increasing his daily water intake.     Kenyon does wish to consider in office procedures for his sinuses.   He was instructed to continue with rinse and Flonase  Aquaphor to both nostrils at night.     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 Informed him regarding CT results, minimal sinus changes in right maxillary. He wishes to consider intervention at this time.   Follow up arranged with Dr. Barrera.     He agrees with this plan    I did refill his Buspar for 30 days. He will contact his Primary Dr for further refills.         Nichole Rodriguez PA-C  ENT  [facility], [place]  [phone]

## 2019-02-15 NOTE — LETTER
2/15/2019         RE: Kenyon Rivera  [address]  [facility] [address]        Dear Colleague,    Thank you for referring your patient, Kenyon Rivera, to the [facility]. Please see a copy of my visit note below.    Chief Complaint   Patient presents with     Sinus Problem     Pt is here for a f/u chronic rhinitis, post nasal drip and chronic pansinusitis.     Throat Problem     Pt is here for a f/u globus sensation and GERD.  Pt was placed on Zantac.     Kenyon returns to ENT for repeat exam. He returns for a recheck of his sinuses and throat. His throat/ reflux symptoms are reduced and notes improvement.   He does continue to have mild nasal congestion despite use of rinse, Flonase.     He has tried rinsing twice a day and uses it once a day PRN.   He had wished to use medication management for control before proceeding w/ any surgical intervention.       Water intake has increased.   Increased Prilosec to 40 mg in AM and Zantac at HS. He has been doing well. Reports slowly improving with his throat symptoms. He has no complaints of reflux. Denies burning in his mouth. He has some reflux remaining, but improvement overall.   Mild sore throat. He has sensation of food getting caught.   Denies  Dysphonia.   He did use tobacco.   He has no fevers, night sweats or weight loss.      TECHNIQUE: CT scan of the sinuses with sagittal coronal  reconstructions     FINDINGS: There is some minimal mucosal thickening in the floor of the  right maxillary sinus. The left maxillary sinus is developmentally  small with mild mucosal thickening. There is mild mucosal thickening  in the ethmoid sinuses. There is mild mucosal thickening seen in the  sphenoid sinuses. Mild frontal mucosal thickening is noted. Nasal  septum is midline turbinates appear intact. The retropharyngeal soft  tissues are normal.     IMPRESSION: Mild mucosal thickening in all the sinuses.       Past Medical History:    Diagnosis Date     Calculus of gallbladder with other cholecystitis, without mention of obstruction 11/18/2004     Displacement of lumbar intervertebral disc without myelopathy 11/17/2000     Hyperplasia of prostate 01/04/2000     Hypertension, Benign 07/11/2011     Insomnia, unspecified 03/17/2011     Lumbago 11/02/1999     Nonallopathic lesion of cervical region, not elsewhere classified 12/19/2001     Nonallopathic lesion of lumbar region, not elsewhere classified 03/05/2003     Nonallopathic lesion of thoracic region, not elsewhere classified 11/17/2000     Other and unspecified hyperlipidemia 12/14/1999     Other diseases of respiratory system, not elsewhere classified 04/21/2004     Other malaise and fatigue 04/05/2006     Pneumonia, organism unspecified(486) 12/13/1999        Allergies   Allergen Reactions     Chlorzoxazone      Parafon Forte     Current Outpatient Medications   Medication     acetaminophen (TYLENOL) 500 MG tablet     aspirin 81 MG tablet     busPIRone (BUSPAR) 10 MG tablet     calcium carbonate (TUMS) 500 MG chewable tablet     cetirizine (ZYRTEC) 10 MG tablet     Cholecalciferol (VITAMIN D3) 2000 UNITS CAPS     cyclobenzaprine (FLEXERIL) 10 MG tablet     dextromethorphan-guaiFENesin (MUCINEX DM)  MG per 12 hr tablet     fluticasone (FLONASE) 50 MCG/ACT nasal spray     Omega-3 Fatty Acids (FISH OIL) 1200 MG CAPS     omeprazole 20 MG tablet     order for DME     order for DME     order for DME     ranitidine (ZANTAC) 300 MG tablet     senna-docusate (SENOKOT-S;PERICOLACE) 8.6-50 MG per tablet     SIMETHICONE-80 PO     simvastatin (ZOCOR) 40 MG tablet     tamsulosin (FLOMAX) 0.4 MG 24 hr capsule     temazepam (RESTORIL) 30 MG capsule     traMADol (ULTRAM) 50 MG tablet     No current facility-administered medications for this visit.       ROS: 10 point ROS neg other than the symptoms noted above in the HPI.  /62 (BP Location: Right arm, Patient Position: Chair, Cuff Size: Adult  "Regular)   Pulse 101   Temp 97.6  F (36.4  C) (Tympanic)   Resp 20   Ht 1.702 m (5' 7\")   Wt 90.7 kg (200 lb)   SpO2 94%   BMI 31.32 kg/m     General - The patient is well nourished and well developed, and appears to have good nutritional status.  Alert and oriented to person and place, answers questions and cooperates with examination appropriately.   Head and Face - Normocephalic and atraumatic, with no gross asymmetry noted.  The facial nerve is intact, with strong symmetric movements.  Voice and Breathing - The patient was breathing comfortably without the use of accessory muscles. There was no wheezing, stridor, or stertor.  The patients voice was clear and strong, and had appropriate pitch and quality.  Ears -The external auditory canals are patent, the tympanic membranes are intact without effusion, retraction or mass.  Bony landmarks are intact.  Eyes - Extraocular movements intact, and the pupils were reactive to light.  Sclera were not icteric or injected, conjunctiva were pink and moist.  Mouth - Examination of the oral cavity showed pink, healthy oral mucosa. No lesions or ulcerations noted.  The tongue was mobile and midline, and the dentition were dentures. No oral sores,lesions.    Throat - The walls of the oropharynx were smooth, pink, moist, symmetric, and had no lesions or ulcerations.  The tonsillar pillars and soft palate were symmetric.  The uvula was midline on elevation.    Neck - Normal midline excursion of the laryngotracheal complex during swallowing.  Full range of motion on passive movement.  Palpation of the occipital, submental, submandibular, internal jugular chain, and supraclavicular nodes did not demonstrate any abnormal lymph nodes or masses.  Palpation of the thyroid was soft and smooth, with no nodules or goiter appreciated.  The trachea was mobile and midline.  Nose - External contour is symmetric, no gross deflection or scars.  Nasal mucosa is pink and moist with no " abnormal mucus.  The septum and turbinates were evaluated:   No polyps, masses, or purulence noted on examination.      ASSESSMENT:    ICD-10-CM    1. Chronic pansinusitis J32.4    2. Chronic rhinitis J31.0    3. Post-nasal drainage R09.82    4. Globus sensation F45.8    5. Gastroesophageal reflux disease, esophagitis presence not specified K21.9    6. Anxiety F41.9 busPIRone (BUSPAR) 10 MG tablet       Continue with Prilosec, Zantac. He has noted improvement since the additional medication.   Use daily for another 2 months, then will discontinue Zantac.   Follow LPR precautions. He is doing well with and reports increasing his daily water intake.     Kenyon does wish to consider in office procedures for his sinuses.   He was instructed to continue with rinse and Flonase  Aquaphor to both nostrils at night.     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 Informed him regarding CT results, minimal sinus changes in right maxillary. He wishes to consider intervention at this time.   Follow up arranged with Dr. Barrera.     He agrees with this plan    I did refill his Buspar for 30 days. He will contact his Primary Dr for further refills.         Nichole Rodriguez PA-C  ENT  [facility], [place]  [phone]        Again, thank you for allowing me to participate in the care of your patient.        Sincerely,        Nichole Rodriguez PA-C

## 2019-02-15 NOTE — PATIENT INSTRUCTIONS
Refilled Buspar. Follow up with Dr. Bernal for further refills    Continue with prilosec 40 mg in AM and Zantac daily  Use Zantac daily for another 1-2 months. Then will try to discontinue, pending symptoms.   If symptoms returns, restart Zantac.   Continue with good water intake    Maintain jaclyn med rinses and Flonase  Follow up with Dr. Barrera for sinus check    Thank you for allowing [facility] and our ENT team to participate in your care.  If your medications are too expensive, please give the nurse a call.  We can possibly change this medication.  If you have a scheduling or an appointment question please contact our Health Unit Coordinator at their direct line [phone].   ALL nursing questions or concerns can be directed to your ENT nurse at: [phone] [place]

## 2019-02-15 NOTE — NURSING NOTE
"Chief Complaint   Patient presents with     Sinus Problem     Pt is here for a f/u chronic rhinitis, post nasal drip and chronic pansinusitis.     Throat Problem     Pt is here for a f/u globus sensation and GERD.  Pt was placed on Zantac.       Initial /62 (BP Location: Right arm, Patient Position: Chair, Cuff Size: Adult Regular)   Pulse 101   Temp 97.6  F (36.4  C) (Tympanic)   Resp 20   Ht 1.702 m (5' 7\")   Wt 90.7 kg (200 lb)   SpO2 94%   BMI 31.32 kg/m   Estimated body mass index is 31.32 kg/m  as calculated from the following:    Height as of this encounter: 1.702 m (5' 7\").    Weight as of this encounter: 90.7 kg (200 lb).  Medication Reconciliation: complete    Alexandria Limon LPN    "

## 2019-02-21 ENCOUNTER — OFFICE VISIT (OUTPATIENT)
Dept: CHIROPRACTIC MEDICINE | Facility: OTHER | Age: 84
End: 2019-02-21
Attending: CHIROPRACTOR
Payer: OTHER MISCELLANEOUS

## 2019-02-21 DIAGNOSIS — M99.03 SEGMENTAL AND SOMATIC DYSFUNCTION OF LUMBAR REGION: Primary | ICD-10-CM

## 2019-02-21 DIAGNOSIS — M54.50 ACUTE BILATERAL LOW BACK PAIN WITHOUT SCIATICA: ICD-10-CM

## 2019-02-21 DIAGNOSIS — M99.02 SEGMENTAL AND SOMATIC DYSFUNCTION OF THORACIC REGION: ICD-10-CM

## 2019-02-21 PROCEDURE — 98940 CHIROPRACT MANJ 1-2 REGIONS: CPT | Mod: AT | Performed by: CHIROPRACTOR

## 2019-02-26 DIAGNOSIS — M54.40 BILATERAL LOW BACK PAIN WITH SCIATICA, SCIATICA LATERALITY UNSPECIFIED, UNSPECIFIED CHRONICITY: ICD-10-CM

## 2019-02-27 RX ORDER — TRAMADOL HYDROCHLORIDE 50 MG/1
TABLET ORAL
Qty: 90 TABLET | Refills: 0 | Status: SHIPPED | OUTPATIENT
Start: 2019-02-27 | End: 2019-05-02

## 2019-02-27 NOTE — TELEPHONE ENCOUNTER
traMADol (ULTRAM) 50 MG tablet      Last Written Prescription Date:  1/4/19  Last Fill Quantity: 90,   # refills: 0  Last Office Visit: 11/7/18  Future Office visit:    Next 5 appointments (look out 90 days)    Apr 08, 2019 10:00 AM CDT  (Arrive by 9:45 AM)  [facility] [address]  [place] [address]  [phone]           Routing refill request to provider for review/approval because:  Drug not on the [facility], [facility] or  Health refill protocol or controlled substance

## 2019-03-21 LAB
ALT SERPL-CCNC: 20 U/L (ref 13–61)
AST SERPL-CCNC: 19 U/L (ref 15–37)
CREAT SERPL-MCNC: 1.5 MG/DL (ref 0.7–1.2)
GLUCOSE SERPL-MCNC: 111 MG/DL (ref 70–100)
POTASSIUM SERPL-SCNC: 4.9 MMOL/L (ref 3.5–5)
TSH SERPL-ACNC: 3.4 UIU/ML (ref 0.3–5)

## 2019-03-25 DIAGNOSIS — G47.09 OTHER INSOMNIA: ICD-10-CM

## 2019-03-25 RX ORDER — TEMAZEPAM 30 MG
30 CAPSULE ORAL
Qty: 15 CAPSULE | Refills: 0 | Status: SHIPPED | OUTPATIENT
Start: 2019-03-25 | End: 2021-02-23

## 2019-03-25 NOTE — TELEPHONE ENCOUNTER
8:38 AM    Reason for Call: Phone Call    Description: Patient  is wondering if he could get a short term supply of Restoril medication as he has not received it from his RX from the mail order pharmacy yet. [aitetn would like short supply to go the [facility] [place].   Patient would like call when completed.  Eboni Patel CMA     Was an appointment offered for this call? No  If yes : Appointment type              Date    Preferred method for responding to this message: Telephone Call  What is your phone number ?[phone]  If we cannot reach you directly, may we leave a detailed response at the number you provided? Yes    Can this message wait until your PCP/provider returns, if available today? Not applicable    Eboni Patel MA

## 2019-04-01 ENCOUNTER — TRANSFERRED RECORDS (OUTPATIENT)
Dept: HEALTH INFORMATION MANAGEMENT | Facility: CLINIC | Age: 84
End: 2019-04-01

## 2019-04-02 ENCOUNTER — OFFICE VISIT (OUTPATIENT)
Dept: CHIROPRACTIC MEDICINE | Facility: OTHER | Age: 84
End: 2019-04-02
Attending: CHIROPRACTOR
Payer: OTHER MISCELLANEOUS

## 2019-04-02 DIAGNOSIS — M99.03 SEGMENTAL AND SOMATIC DYSFUNCTION OF LUMBAR REGION: Primary | ICD-10-CM

## 2019-04-02 DIAGNOSIS — M99.01 SEGMENTAL AND SOMATIC DYSFUNCTION OF CERVICAL REGION: ICD-10-CM

## 2019-04-02 DIAGNOSIS — M54.50 ACUTE BILATERAL LOW BACK PAIN WITHOUT SCIATICA: ICD-10-CM

## 2019-04-02 DIAGNOSIS — M99.02 SEGMENTAL AND SOMATIC DYSFUNCTION OF THORACIC REGION: ICD-10-CM

## 2019-04-02 PROCEDURE — 98941 CHIROPRACT MANJ 3-4 REGIONS: CPT | Mod: AT | Performed by: CHIROPRACTOR

## 2019-04-02 NOTE — PROGRESS NOTES
Subjective Finding:    Chief compalint: Patient presents with:  Back Pain  Neck Pain  , Pain Scale: 5/10, Intensity: dull and ache, Duration: 2 weeks, Change since last visit: , Radiating: bilateral buttock.    Date of injury:     Activities that the pain restricts:   Home/household/hobbies/social activities: yes.  Work duties: yes.  Sleep: no.  Makes symptoms better: rest.  Makes symptoms worse: activity, lumbar extension and lumbar flexion.  Have you seen anyone else for the symptoms? no.  Work related: no  Automobile related injury: no.    Objective and Assessment:    Posture Analysis:   High shoulder: right.  Head tilt: right.  High iliac crest: right.  Head carriage: neutral.  Thoracic Kyphosis: neutral.  Lumbar Lordosis: forward.    Lumbar Range of Motion: flexion decreased, extension decreased, left lateral flexion decreased and right lateral flexion decreased.  Cervical Range of Motion: .  Thoracic Range of Motion: .  Extremity Range of Motion: .    Palpation:   Quad lumb: bilateral, referred pain: no    Segmental dysfunction pre-treatment: T10  L4-5  SI. c5    Assessment post-treatment:  Cervical: ROM increased.  Thoracic: ROM increased.  Lumbar: ROM increased, pain and tenderness decreased and muscle spasm decreased.    Comments: .      Complicating Factors: .    Plan / Procedure:    Expected release date: .  Treatment plan: 1 times per month.  Instructed patient: ice 20 minutes every other hour as needed and rest.  Short term goals: reduce pain.  Long term goals: restore normal function.  Prognosis: excellent.

## 2019-04-08 ENCOUNTER — OFFICE VISIT (OUTPATIENT)
Dept: OTOLARYNGOLOGY | Facility: OTHER | Age: 84
End: 2019-04-08
Attending: OTOLARYNGOLOGY
Payer: MEDICARE

## 2019-04-08 VITALS
SYSTOLIC BLOOD PRESSURE: 120 MMHG | HEIGHT: 67 IN | WEIGHT: 200 LBS | HEART RATE: 111 BPM | TEMPERATURE: 98.1 F | BODY MASS INDEX: 31.39 KG/M2 | DIASTOLIC BLOOD PRESSURE: 62 MMHG | OXYGEN SATURATION: 93 %

## 2019-04-08 DIAGNOSIS — J34.2 DNS (DEVIATED NASAL SEPTUM): ICD-10-CM

## 2019-04-08 DIAGNOSIS — J32.4 CHRONIC PANSINUSITIS: Primary | ICD-10-CM

## 2019-04-08 DIAGNOSIS — H93.13 TINNITUS, BILATERAL: ICD-10-CM

## 2019-04-08 DIAGNOSIS — J34.3 NASAL TURBINATE HYPERTROPHY: ICD-10-CM

## 2019-04-08 DIAGNOSIS — J34.89 CONCHA BULLOSA: ICD-10-CM

## 2019-04-08 DIAGNOSIS — H90.3 SENSORINEURAL HEARING LOSS (SNHL) OF BOTH EARS: ICD-10-CM

## 2019-04-08 PROCEDURE — G0463 HOSPITAL OUTPT CLINIC VISIT: HCPCS | Mod: 25

## 2019-04-08 PROCEDURE — 99215 OFFICE O/P EST HI 40 MIN: CPT | Mod: 25 | Performed by: OTOLARYNGOLOGY

## 2019-04-08 PROCEDURE — 31231 NASAL ENDOSCOPY DX: CPT | Performed by: OTOLARYNGOLOGY

## 2019-04-08 RX ORDER — BUDESONIDE 0.5 MG/2ML
INHALANT ORAL
Qty: 3 BOX | Refills: 11 | Status: SHIPPED | OUTPATIENT
Start: 2019-04-08 | End: 2019-04-17

## 2019-04-08 RX ORDER — LANOLIN ALCOHOL/MO/W.PET/CERES
CREAM (GRAM) TOPICAL DAILY
COMMUNITY
End: 2019-05-30 | Stop reason: DRUGHIGH

## 2019-04-08 ASSESSMENT — MIFFLIN-ST. JEOR: SCORE: 1555.82

## 2019-04-08 ASSESSMENT — PAIN SCALES - GENERAL: PAINLEVEL: NO PAIN (1)

## 2019-04-08 NOTE — NURSING NOTE
"Chief Complaint   Patient presents with     RECHECK     Follow Up Chronic Pansinusitis, Chronic Rhinitis, Post Nasal Drainage, Globus Sensation, GERD       Initial /62 (BP Location: Right arm)   Pulse 111   Temp 98.1  F (36.7  C) (Tympanic)   Ht 1.702 m (5' 7\")   Wt 90.7 kg (200 lb)   SpO2 93%   BMI 31.32 kg/m   Estimated body mass index is 31.32 kg/m  as calculated from the following:    Height as of this encounter: 1.702 m (5' 7\").    Weight as of this encounter: 90.7 kg (200 lb).  Medication Reconciliation: complete    Tabitha Gonzales LPN  "

## 2019-04-08 NOTE — PROGRESS NOTES
"Otolaryngology Progress Note      History of Present Illness   Patient presents with:  RECHECK: Follow Up Chronic Pansinusitis, Chronic Rhinitis, Post Nasal Drainage, Globus Sensation, GERD      Kenyon Rivera is a 84 year old male  Presents for evaluation of his sinuses.  He saw Nichole on February 15 for chronic congestion despite the use of Flonase and jaclyn med    He continues to have chronic congestion thank you for totaling this  He does have to take a decongestant at least twice daily or he is not able to breathe out of his nose and his copious thick postnasal drainage worsens  He does use the Neomed saline irrigation which seems to help somewhat    Distant history of baseball injury to the nose as a teen without prior nasal or sinus surgery  He denies history of prior sinus surgery    Denies problematic history of seasonal allergies    He has used Breathe Right strips in the past without improvement  No history of bleeding or anesthesia complications    He also has long-standing history of bilateral tinnitus.  History of noise exposure from  gunfire he does [facility] he states he had an audiogram within the past year and denies known asymmetrical sensorineural hearing loss there is no fluctuating hearing loss or problematic vertigo    Physical Exam  /62 (BP Location: Right arm)   Pulse 111   Temp 98.1  F (36.7  C) (Tympanic)   Ht 1.702 m (5' 7\")   Wt 90.7 kg (200 lb)   SpO2 93%   BMI 31.32 kg/m       General - The patient is well nourished and well developed, and appears to have good nutritional status.  Alert and oriented to person and place, interactive.  Head and Face - Normocephalic and atraumatic, with no gross asymmetry noted of the contour of the facial features.  The facial nerve is intact, with strong symmetric movements.  Neck-no palpable lymphadenopathy or thyroid mass.  Trachea is midline.  Eyes - Extraocular movements intact.   Ears- External auditory " canals are patent, tympanic membranes are intact without effusion or worrisome retractions   Nose - see below, no purulence,   Mouth - Examination of the oral cavity shows pink, healthy, moist mucosa.  No lesions or ulceration noted.  The dentition are in good repair.  The tongue is mobile and midline.  Throat - The walls of the oropharynx were smooth, pink, moist, symmetric, and had no lesions or ulcerations.  The tonsillar pillars and soft palate were symmetric.  The uvula was midline on elevation.      To evaluate the nose and sinuses in the post operative state, I performed rigid nasal endoscopy. The LPN had previously sprayed both nares with lidocaine and neosynephrine.    I began with the LEFT side using a 0 degree rigid nasal endoscope, and then similarly examined the RIGHT side    Findings:  Inferior turbinates:  Mildly enlarged  Septum deviated to the right mid septal spur  Middle turbinate and middle meatus:  No purulence, no polyposis, bilateral sixto bullosa present  Bilateral natural ostia without olu purulence  He does have some small natural anterior ethmoid aeration with edematous mucosa  Mucosa is healthy throughout,  No olu polyps nor polypoid degeneration  Nasopharynx not able to viz due to discomfort  The patient tolerated the procedure well      CT sinuses dated 11/9/2018 reveals minimal mucoperiosteal thickening of the frontal anterior ethmoid and maxillary sinuses with occlusion of the ostiomeatal complexes sphenoid sinuses minimal mucoperiosteal thickening in the right thereare bilateral  sixto bullosa  The inferior turbinates are not edematous  The skull base is intact the lamina is intact the optic nerve is not dehiscent  keros 1  lashay 13/24  SNOT 27        Impression/Plan  Kenyon Rivera is a 84 year old male    ICD-10-CM    1. 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 Nasal turbinate hypertrophy J34.3    4. DNS  (deviated nasal septum) J34.2    5. Tinnitus, bilateral H93.13    6. Sensorineural hearing loss (SNHL) of both ears H90.3        The risks and complications of bilateral endoscopic Sinus Surgery (ESS), septoplasty, turbinate reduction   were openly discussed.  The potential risks include bleeding, general anesthesia, infection, scar formation, need for additional surgery, septal perforation, and rarely injury to the eye with the possibility of blindness,  injury to the brain, meningitis or other intracranial complications.  Nonsurgical options were discussed including prolonged antibioitics and/or oral and topical steroids.   Sinus anatomy and sinus disease were reviewed.  CT sinus was reviewed with the patient.  All questions were answered.     This would include total I may need leave the left sphenoid alone depending on intraoperative anatomy small left sphenoid sinus with minimal mucoperiosteal thickening  This would include excision bilateral sixto bullosa I would use Coblation PTR blade for the turbinate reduction  I may be able to just bluntly medialize the  Septum      The current leading theory on tinnitus is that it originates from the central nervous system itself, and is usually associated with hearing loss and injury to cochlear hair cells.  Also discussed were steps that can be taken to mask the noise, such as a low volume de-tuned radio, a fan in the background, and hearing aids.  Correlation with stress, anxiety, depression, and high blood pressure were also discussed.  The patient was also cautioned on the numerous expensive non-pharmaceutical options that are advertised, and have no proven benefit.    The patient will follow up as necessary for worsening symptoms, changes in symptoms, or signs of infection.  I have also recommended yearly audiograms, and consideration of a hearing aid evaluation.    Tinnitus education printed material provided.  Recommend [facility], which [facility],  if hearing loss is a concern as tinnitus is generally an underlying sign of hearing loss.     Total exam time was over 40 minutes with over 25 minutes of this time spent in direct patient education, counseling and coordination of care.  We discussed the importance of high flow nasal saline use to prevent nasal secretion stasis, the correct use of nasal steroids, and nasal and sinus anatomy were reviewed.        Theresa Barrera D.O.  Otolaryngology/Head and Neck Surgery  Allergy

## 2019-04-08 NOTE — LETTER
"    4/8/2019         RE: Kenyon Rivera  [address]  [facility] [address]        Dear Colleague,    Thank you for referring your patient, Kenyon Rivera, to the [facility]. Please see a copy of my visit note below.    Otolaryngology Progress Note      History of Present Illness   Patient presents with:  RECHECK: Follow Up Chronic Pansinusitis, Chronic Rhinitis, Post Nasal Drainage, Globus Sensation, GERD      Kenyon Rivera is a 84 year old male  Presents for evaluation of his sinuses.  He saw Nichole on February 15 for chronic congestion despite the use of Flonase and jaclyn med    He continues to have chronic congestion thank you for totaling this  He does have to take a decongestant at least twice daily or he is not able to breathe out of his nose and his copious thick postnasal drainage worsens  He does use the Neomed saline irrigation which seems to help somewhat    Distant history of baseball injury to the nose as a teen without prior nasal or sinus surgery  He denies history of prior sinus surgery    Denies problematic history of seasonal allergies    He has used Breathe Right strips in the past without improvement  No history of bleeding or anesthesia complications    He also has long-standing history of bilateral tinnitus.  History of noise exposure from  gunfire he does [facility] he states he had an audiogram within the past year and denies known asymmetrical sensorineural hearing loss there is no fluctuating hearing loss or problematic vertigo    Physical Exam  /62 (BP Location: Right arm)   Pulse 111   Temp 98.1  F (36.7  C) (Tympanic)   Ht 1.702 m (5' 7\")   Wt 90.7 kg (200 lb)   SpO2 93%   BMI 31.32 kg/m       General - The patient is well nourished and well developed, and appears to have good nutritional status.  Alert and oriented to person and place, interactive.  Head and Face - Normocephalic and atraumatic, with no gross " asymmetry noted of the contour of the facial features.  The facial nerve is intact, with strong symmetric movements.  Neck-no palpable lymphadenopathy or thyroid mass.  Trachea is midline.  Eyes - Extraocular movements intact.   Ears- External auditory canals are patent, tympanic membranes are intact without effusion or worrisome retractions   Nose - see below, no purulence,   Mouth - Examination of the oral cavity shows pink, healthy, moist mucosa.  No lesions or ulceration noted.  The dentition are in good repair.  The tongue is mobile and midline.  Throat - The walls of the oropharynx were smooth, pink, moist, symmetric, and had no lesions or ulcerations.  The tonsillar pillars and soft palate were symmetric.  The uvula was midline on elevation.      To evaluate the nose and sinuses in the post operative state, I performed rigid nasal endoscopy. The LPN had previously sprayed both nares with lidocaine and neosynephrine.    I began with the LEFT side using a 0 degree rigid nasal endoscope, and then similarly examined the RIGHT side    Findings:  Inferior turbinates:  Mildly enlarged  Septum deviated to the right mid septal spur  Middle turbinate and middle meatus:  No purulence, no polyposis, bilateral sixto bullosa present  Bilateral natural ostia without olu purulence  He does have some small natural anterior ethmoid aeration with edematous mucosa  Mucosa is healthy throughout,  No olu polyps nor polypoid degeneration  Nasopharynx not able to viz due to discomfort  The patient tolerated the procedure well      CT sinuses dated 11/9/2018 reveals minimal mucoperiosteal thickening of the frontal anterior ethmoid and maxillary sinuses with occlusion of the ostiomeatal complexes sphenoid sinuses minimal mucoperiosteal thickening in the right thereare bilateral  sixto bullosa  The inferior turbinates are not edematous  The skull base is intact the lamina is intact the optic nerve is not dehiscent  leanna 1  lashay  13/24  SNOT 27        Impression/Plan  Kenyon Rivera is a 84 year old male    ICD-10-CM    1. Chronic pansinusitis J32.4 budesonide (PULMICORT) 0.5 MG/2ML neb solution   2. 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 Nasal turbinate hypertrophy J34.3    4. DNS (deviated nasal septum) J34.2    5. Tinnitus, bilateral H93.13    6. Sensorineural hearing loss (SNHL) of both ears H90.3        The risks and complications of bilateral endoscopic Sinus Surgery (ESS), septoplasty, turbinate reduction   were openly discussed.  The potential risks include bleeding, general anesthesia, infection, scar formation, need for additional surgery, septal perforation, and rarely injury to the eye with the possibility of blindness,  injury to the brain, meningitis or other intracranial complications.  Nonsurgical options were discussed including prolonged antibioitics and/or oral and topical steroids.   Sinus anatomy and sinus disease were reviewed.  CT sinus was reviewed with the patient.  All questions were answered.     This would include total I may need leave the left sphenoid alone depending on intraoperative anatomy small left sphenoid sinus with minimal mucoperiosteal thickening  This would include excision bilateral sixto bullosa I would use Coblation PTR blade for the turbinate reduction  I may be able to just bluntly medialize the  Septum      The current leading theory on tinnitus is that it originates from the central nervous system itself, and is usually associated with hearing loss and injury to cochlear hair cells.  Also discussed were steps that can be taken to mask the noise, such as a low volume de-tuned radio, a fan in the background, and hearing aids.  Correlation with stress, anxiety, depression, and high blood pressure were also discussed.  The patient was also cautioned on the numerous expensive non-pharmaceutical options that are advertised, and have no proven benefit.    The patient will follow up as necessary for worsening  symptoms, changes in symptoms, or signs of infection.  I have also recommended yearly audiograms, and consideration of a hearing aid evaluation.    Tinnitus education printed material provided.  Recommend [facility], which [facility],  if hearing loss is a concern as tinnitus is generally an underlying sign of hearing loss.     Total exam time was over 40 minutes with over 25 minutes of this time spent in direct patient education, counseling and coordination of care.  We discussed the importance of high flow nasal saline use to prevent nasal secretion stasis, the correct use of nasal steroids, and nasal and sinus anatomy were reviewed.        Theresa Barrera D.O.  Otolaryngology/Head and Neck Surgery  Allergy            Again, thank you for allowing me to participate in the care of your patient.        Sincerely,        Theresa Barrera MD

## 2019-04-08 NOTE — PATIENT INSTRUCTIONS
Thank you for allowing Dr. Barrera and our ENT team to participate in your care.  If your medications are too expensive, please give the nurse a call.  We can possibly change this medication.  If you have a scheduling or an appointment question please contact our Health Unit Coordinator at their direct line [phone].   ALL nursing questions or concerns can be directed to your ENT nurse at: [phone] Kahlil Rinaldi    Use Budesonide Rinses Twice Daily  Continue Flonase  Follow up in surgery    Budesonide nasal saline irrigation per instructions:  -Obtain Tito Med Sinus rinse over the counter.    -Use warm distilled water and 2 packets of the salt solution that comes with the bottle, dissolve in bottle up to the 240 mL kar.  -Add 1 vial of budesonide.  -Irrigate each side of your nose leaning over the sink, using 1/3 to 1/2 the volume of the bottle in each nostril every irrigation.  Irrigate 2 times daily.  -If additional rinses are needed/recommended, you may use the plan Tito Med Sinus irrigation without the use of added budesonide.       Instructions for Sinus Surgery    Recovery - Everyone recovers differently from a general anesthetic.  Symptoms such as fatigue, nausea, light-headedness, and sometimes a low grade fever (up to 100 degrees) are not unusual.  As your body removes the anesthetic drugs from circulation, these symptoms will resolve.  Your nose will be sore after surgery, and you may even have symptoms similar to a sinus infection with headache, congestion, and pressure.  These will resolve with healing.  For several days you may experience bloody drainage from the nose, please use the drip pad as necessary for this.  If there is persistent bleeding, please call the office during business hours or the on call ENT physician after hours.  There are no diet restrictions after sinus surgery, and you can resume your home medications.      Please do not blow your nose until 1 week after surgery.  At 1  week you may gently blow your nose, unless otherwise indicated by Dr. Barrera.     Limit your activity to no strenuous activities until I see you for the first follow-up visit in approximately 2 weeks.      Medications - You were sent home with narcotic pain medication.  If you can tolerate the discomfort during your recovery by using just plain Tylenol or ibuprofen (advil), please do so.  However, do not hesitate to use the stronger pain medication if needed.  If you were sent home with an antibiotic, it is primarily used to help the healing process.  If it causes loose bowel movements or other signs of intolerance, it is appropriate to discontinue it.  By far the most important measure you can take to speed recovery, and maximize the chances of long term success of sinus surgery is using the sinus rinses at least three time per day for the first month after surgery.       Start Jaclyn Med saline irrigation tomorrow and use at least 5 times daily.     I recommend 2 jaclyn med bottles, one to add the budesonide to and irrigate with the budesonide rinse twice a day for 2 months.    In the other jaclyn med bottle use only the saline solution, and irrigate with this at least 3 additional times daily.    Perform gentle irrigation for the first week.  Starting 1 week after surgery, you should increase the volume of jaclyn med saline irrigation to each nostril, continuing to use the rinses in an alternating fashion at least 5 times daily.  You cannot use too much of the jaclyn med saline, but please limit budesonide rinses to twice daily.    At 2 weeks after surgery, you may also restart nasal steroids (flonase, nasonex, etc).        Complications - Problems related to sinus surgery almost always are detected during the operation, and special instruction will be given in that situation.  However, unexpected things can happen, and are all related to the structures around the sinus cavities.  Symptoms that should alert you to a  possible problem include: severe eye pain or eye swelling, persistent heavy bleeding from the nose, and high fevers with headache and neck pain.  Any of these symptoms should be called into my office or to the on call ENT if after hours.  The most common non-emergency complication of sinus surgery is the formation of scar tissue which can re-block the sinuses.  This is addressed below.    Follow-up -  As you have noted, there are quite a few follow-up visits after sinus surgery.  This is done to aggressively manage the most common complication of this technique, which is scar tissue blocking the sinuses.  These visits will require the examination of your nose and possibly removal of crusts of dry mucous and blood, with possible removal of early scar tissue.  Please prepare for these visits by using your sinus rinses.    If there are any questions or issues with the above, or if there are other issues that concern you, always feel free to call the clinic and I am happy to speak with you as soon as I can.    Theresa Barrera D.O.  Otolaryngology/Head and Neck Surgery  Allergy    [phone] office            HOW TO PREPARE-      You need to have a scheduled Pre-Op with your primary care physician within 30 days of your scheduled surgery. You should be set up with this before you leave today.       You need a friend or family member available to drive you home AND stay with you for 24 hours after you leave the hospital. You will not be allowed to drive yourself. IF you need to take a taxi or the bus you MUST have a responsible person to ride with you. YOUR PROCEDURE WILL BE CANCELLED IF YOU DO NOT HAVE A RESPONSIBLE ADULT TO DRIVE YOU HOME.       You CANNOT have anything to eat or drink after midnight the night before your surgery, including water and coffee. Your stomach needs to be completely empty. Do NOT chew gum, suck on hard candy, or smoke. You can brush your teeth the morning of surgery.       The  Surgery Education Nurses will call you  1-2 weeks prior to your surgery date at  [phone] or toll free [phone]. Please have your medication and allergy lists ready.      Stop your aspirin or other NSAIDs(Ibuprofen, Motrin, Aleve, Celebrex, Naproxen, etc...) 7 days before your surgery.      Hospital admitting will call you the day before your surgery with your exact arrival time.       Please call your primary care physician if you should become ill within 24 hours of scheduled surgery. (ex.vomiting, diarrhea, fever)          You will need to wash the night before AND the morning of you procedure with a liquid antibacterial soap, like Dial.

## 2019-04-15 ENCOUNTER — TELEPHONE (OUTPATIENT)
Dept: OTOLARYNGOLOGY | Facility: OTHER | Age: 84
End: 2019-04-15

## 2019-04-15 NOTE — TELEPHONE ENCOUNTER
This patient calls today to cancel his upcoming surgery with Dr. Barrera. He states that he will call back in the fall to reschedule.

## 2019-04-16 NOTE — PROGRESS NOTES
SUBJECTIVE:   Kenyon Rivera is a 84 year old male who presents to clinic today for the following   health issues:      ED/UC Followup:    Facility:  [facility]  Date of visit: 4/12/19  Reason for visit: UTI  Current Status: feeling better but is now having trouble with bowel.          Insomnia      Duration: about the last week    Description  Frequency of insomnia:  nightly  Time to fall asleep: 30 minutes  Middle of night awakening:  nightly  Early morning awakening:  nightly    Accompanying signs and symptoms:  Weight loss    History  Similar episodes in past:  YES  Previous evaluation/sleep study:  no     Precipitating or alleviating factors:  New stressful situation: yes, car trouble and a nose issue  Caffeine intake after lunchtime: YES- sometimes  OTC decongestants: no   Any new medications: no     Therapies tried and outcome: none      Additional history: lengthy review today.  Had UTI sx.  Reviewed labs.  Urine cx never done.  He is very constipated.  Wondering what to do there.  Having trouble sleeping some right now.      Reviewed  and updated as needed this visit by clinical staff         Reviewed and updated as needed this visit by Provider         Patient Active Problem List   Diagnosis     Advanced care planning/counseling discussion     Hypertension, Benign     Hyperlipidemia     Insomnia     Lumbago     Hypertrophy of prostate without urinary obstruction     ACP (advance care planning)     Controlled substance agreement signed     Blood glucose elevated     Anxiety     Past Surgical History:   Procedure Laterality Date     APPENDECTOMY       cataract extraction and lens implantation  3/2007    LEFT     CHOLECYSTECTOMY       electric stimulator implant       excision of Dupuytren's contracture extending into ring finger  02/08/2008    Bilateral     inguinal herniography      LEFT     NEPHRECTOMY  1992    RIGHT > Renal Cancer     OPEN REDUCTION INTERNAL FIXATION ANKLE      RIGHT     penile implant        JOÃO         Social History     Tobacco Use     Smoking status: Former Smoker     Packs/day: 2.00     Years: 26.00     Pack years: 52.00     Types: Cigarettes     Start date: 1949     Last attempt to quit: 1975     Years since quittin.2     Smokeless tobacco: Never Used     Tobacco comment: Tried to Quit (YES)   Substance Use Topics     Alcohol use: Yes     Comment: RARELY     Family History   Problem Relation Age of Onset     Diabetes Brother      C.A.D. Mother      Myocardial Infarction Mother      Heart Disease Mother         Heart Disease     Cancer Sister         Leukemia     Other - See Comments Other         Colitis         Current Outpatient Medications   Medication Sig Dispense Refill     acetaminophen (TYLENOL) 500 MG tablet Take 650 mg by mouth every 6 hours as needed        aspirin 81 MG tablet Take 81 mg by mouth daily        busPIRone (BUSPAR) 10 MG tablet Take 1 tablet (10 mg) by mouth 2 times daily 60 tablet 0     calcium carbonate (TUMS) 500 MG chewable tablet Take 1 chew tab by mouth every 4 hours as needed for heartburn       cephALEXin (KEFLEX) 500 MG capsule Take 500 mg by mouth 2 times daily  0     cetirizine (ZYRTEC) 10 MG tablet Take 10 mg by mouth daily as needed for allergies       Cholecalciferol (VITAMIN D3) 2000 UNITS CAPS Take by mouth daily       cyanocobalamin (VITAMIN B-12) 1000 MCG tablet Take by mouth daily       cyclobenzaprine (FLEXERIL) 10 MG tablet TAKE 1 TABLET(10 MG) BY MOUTH TWICE DAILY 180 tablet 3     fluticasone (FLONASE) 50 MCG/ACT nasal spray Spray 2 sprays into both nostrils daily 3 Bottle 11     Omega-3 Fatty Acids (FISH OIL) 1200 MG CAPS Take by mouth daily Fish oil 1290 omega 3 900       omeprazole 20 MG tablet Take 2 tablets by mouth daily        order for DME Tens unit and supplies 1 each 11     order for DME Tens unit. 1 each 1     order for DME Elastic Back brace with stays and velcro enclosure 1 each 0     ranitidine (ZANTAC) 300 MG tablet  Take 1 tablet (300 mg) by mouth At Bedtime 90 tablet 3     RANITIDINE HCL PO Take 300 mg by mouth At Bedtime       senna-docusate (SENOKOT-S;PERICOLACE) 8.6-50 MG per tablet Take 1 tablet by mouth daily       SIMETHICONE-80 PO Take 80 mg by mouth Every 4 hours as PRN       simvastatin (ZOCOR) 40 MG tablet Take 20 mg by mouth At Bedtime       sulfamethoxazole-trimethoprim (BACTRIM DS/SEPTRA DS) 800-160 MG tablet TK 1 T PO  BID FOR 7 DAYS  0     tamsulosin (FLOMAX) 0.4 MG 24 hr capsule Take 1 capsule by mouth At Bedtime.       temazepam (RESTORIL) 30 MG capsule Take 1 capsule (30 mg) by mouth nightly as needed 15 capsule 0     traMADol (ULTRAM) 50 MG tablet TAKE 1 TABLET BY MOUTH EVERY 8 HOURS AS NEEDED FOR PAIN 90 tablet 0     Allergies   Allergen Reactions     Chlorzoxazone      Parafon Forte       ROS:  Constitutional, HEENT, cardiovascular, pulmonary, gi and gu systems are negative, except as otherwise noted.    OBJECTIVE:                                                    /72   Pulse 99   Temp 96.9  F (36.1  C) (Tympanic)   Wt 87.1 kg (192 lb)   SpO2 97%   BMI 30.07 kg/m    Body mass index is 30.07 kg/m .  GENERAL APPEARANCE: Alert, no acute distress  CV: regular rate and rhythm, no murmur, rub or gallop  RESP: lungs clear to auscultation bilaterally  ABDOMEN: normal bowel sounds, soft, nontender, no hepatosplenomegaly or other masses  SKIN: no suspicious lesions or rashes to visualized skin  NEURO: Alert, oriented x 3, speech and mentation normal      abd xray excessive stool.  UA normal.       ASSESSMENT/PLAN:                                                    1. Urinary tract infection without hematuria, site unspecified  Stable.  Finish the abx.  F/u with ongoing concerns.  No cx was done but the bactrim worked.    - *UA reflex to Microscopic and Culture - [facility]    2. Constipation, unspecified constipation type  Reviewed at length.  I recommend mag citrate x 1.  Continue softeners, etc.   He will do so.    - XR ABDOMEN 1 VIEW (Clinic Performed); Future    3. Insomnia, unspecified type  Doing ok.  No change overall.  Didn't really get to this one.        25 minutes spent with patient over 50% in counseling about the abdominal sx, gas, stool, and constipation tx, along with the urine issues and ongoing concerns with that.      Eric Bernal MD  [facility]

## 2019-04-17 ENCOUNTER — OFFICE VISIT (OUTPATIENT)
Dept: FAMILY MEDICINE | Facility: OTHER | Age: 84
End: 2019-04-17
Attending: FAMILY MEDICINE
Payer: COMMERCIAL

## 2019-04-17 ENCOUNTER — ANCILLARY PROCEDURE (OUTPATIENT)
Dept: GENERAL RADIOLOGY | Facility: OTHER | Age: 84
End: 2019-04-17
Attending: FAMILY MEDICINE
Payer: MEDICARE

## 2019-04-17 VITALS
OXYGEN SATURATION: 97 % | WEIGHT: 192 LBS | TEMPERATURE: 96.9 F | HEART RATE: 99 BPM | SYSTOLIC BLOOD PRESSURE: 150 MMHG | BODY MASS INDEX: 30.07 KG/M2 | DIASTOLIC BLOOD PRESSURE: 72 MMHG

## 2019-04-17 DIAGNOSIS — N39.0 URINARY TRACT INFECTION WITHOUT HEMATURIA, SITE UNSPECIFIED: Primary | ICD-10-CM

## 2019-04-17 DIAGNOSIS — K59.00 CONSTIPATION, UNSPECIFIED CONSTIPATION TYPE: ICD-10-CM

## 2019-04-17 DIAGNOSIS — G47.00 INSOMNIA, UNSPECIFIED TYPE: ICD-10-CM

## 2019-04-17 LAB
ALBUMIN UR-MCNC: NEGATIVE MG/DL
APPEARANCE UR: CLEAR
BILIRUB UR QL STRIP: NEGATIVE
COLOR UR AUTO: YELLOW
GLUCOSE UR STRIP-MCNC: NEGATIVE MG/DL
HGB UR QL STRIP: NEGATIVE
KETONES UR STRIP-MCNC: NEGATIVE MG/DL
LEUKOCYTE ESTERASE UR QL STRIP: NEGATIVE
NITRATE UR QL: NEGATIVE
PH UR STRIP: 6 PH (ref 5–7)
SOURCE: NORMAL
SP GR UR STRIP: <=1.005 (ref 1–1.03)
UROBILINOGEN UR STRIP-ACNC: 0.2 EU/DL (ref 0.2–1)

## 2019-04-17 PROCEDURE — 74018 RADEX ABDOMEN 1 VIEW: CPT | Mod: TC,FY

## 2019-04-17 PROCEDURE — 81003 URINALYSIS AUTO W/O SCOPE: CPT | Mod: ZL | Performed by: FAMILY MEDICINE

## 2019-04-17 PROCEDURE — G0463 HOSPITAL OUTPT CLINIC VISIT: HCPCS

## 2019-04-17 PROCEDURE — 99214 OFFICE O/P EST MOD 30 MIN: CPT | Performed by: FAMILY MEDICINE

## 2019-04-17 RX ORDER — SULFAMETHOXAZOLE/TRIMETHOPRIM 800-160 MG
TABLET ORAL
Refills: 0 | COMMUNITY
Start: 2019-04-12 | End: 2019-05-30

## 2019-04-17 RX ORDER — CEPHALEXIN 500 MG/1
500 CAPSULE ORAL 2 TIMES DAILY
Refills: 0 | COMMUNITY
Start: 2019-04-12 | End: 2019-05-30

## 2019-04-17 ASSESSMENT — PAIN SCALES - GENERAL: PAINLEVEL: MILD PAIN (2)

## 2019-04-17 NOTE — NURSING NOTE
"Chief Complaint   Patient presents with     UC Follow-Up       Initial /72   Pulse 99   Temp 96.9  F (36.1  C) (Tympanic)   Wt 87.1 kg (192 lb)   SpO2 97%   BMI 30.07 kg/m   Estimated body mass index is 30.07 kg/m  as calculated from the following:    Height as of 4/8/19: 1.702 m (5' 7\").    Weight as of this encounter: 87.1 kg (192 lb).  Medication Reconciliation: complete    Angela Rodriguez MA    "

## 2019-04-30 ENCOUNTER — OFFICE VISIT (OUTPATIENT)
Dept: CHIROPRACTIC MEDICINE | Facility: OTHER | Age: 84
End: 2019-04-30
Attending: CHIROPRACTOR
Payer: COMMERCIAL

## 2019-04-30 DIAGNOSIS — M99.02 SEGMENTAL AND SOMATIC DYSFUNCTION OF THORACIC REGION: ICD-10-CM

## 2019-04-30 DIAGNOSIS — M99.03 SEGMENTAL AND SOMATIC DYSFUNCTION OF LUMBAR REGION: Primary | ICD-10-CM

## 2019-04-30 DIAGNOSIS — M54.50 ACUTE BILATERAL LOW BACK PAIN WITHOUT SCIATICA: ICD-10-CM

## 2019-04-30 PROCEDURE — 98940 CHIROPRACT MANJ 1-2 REGIONS: CPT | Mod: AT | Performed by: CHIROPRACTOR

## 2019-05-02 DIAGNOSIS — M54.40 BILATERAL LOW BACK PAIN WITH SCIATICA, SCIATICA LATERALITY UNSPECIFIED, UNSPECIFIED CHRONICITY: ICD-10-CM

## 2019-05-02 RX ORDER — TRAMADOL HYDROCHLORIDE 50 MG/1
TABLET ORAL
Qty: 90 TABLET | Refills: 0 | Status: SHIPPED | OUTPATIENT
Start: 2019-05-02 | End: 2019-06-18

## 2019-05-02 NOTE — TELEPHONE ENCOUNTER
Tramadol      Last Written Prescription Date:  2/27/19  Last Fill Quantity: 90,   # refills: 0  Last Office Visit: 4/17/19  Future Office visit:       Routing refill request to provider for review/approval because:

## 2019-05-14 ENCOUNTER — OFFICE VISIT (OUTPATIENT)
Dept: CHIROPRACTIC MEDICINE | Facility: OTHER | Age: 84
End: 2019-05-14
Attending: CHIROPRACTOR
Payer: COMMERCIAL

## 2019-05-14 DIAGNOSIS — M99.03 SEGMENTAL AND SOMATIC DYSFUNCTION OF LUMBAR REGION: Primary | ICD-10-CM

## 2019-05-14 DIAGNOSIS — M54.50 ACUTE BILATERAL LOW BACK PAIN WITHOUT SCIATICA: ICD-10-CM

## 2019-05-14 DIAGNOSIS — M99.02 SEGMENTAL AND SOMATIC DYSFUNCTION OF THORACIC REGION: ICD-10-CM

## 2019-05-14 PROCEDURE — 98940 CHIROPRACT MANJ 1-2 REGIONS: CPT | Mod: AT | Performed by: CHIROPRACTOR

## 2019-05-16 NOTE — PROGRESS NOTES
Subjective Finding:    Chief compalint: Patient presents with:  Back Pain: still having low back pains  , Pain Scale: 5/10, Intensity: dull and ache, Duration: 3 weeks, Change since last visit: , Radiating: bilateral buttock.    Date of injury:     Activities that the pain restricts:   Home/household/hobbies/social activities: yes.  Work duties: yes.  Sleep: no.  Makes symptoms better: rest.  Makes symptoms worse: activity, lumbar extension and lumbar flexion.  Have you seen anyone else for the symptoms? no.  Work related: no  Automobile related injury: no.    Objective and Assessment:    Posture Analysis:   High shoulder: right.  Head tilt: right.  High iliac crest: right.  Head carriage: neutral.  Thoracic Kyphosis: neutral.  Lumbar Lordosis: forward.    Lumbar Range of Motion: flexion decreased, extension decreased, left lateral flexion decreased and right lateral flexion decreased.  Cervical Range of Motion: .  Thoracic Range of Motion: .  Extremity Range of Motion: .    Palpation:   Quad lumb: bilateral, referred pain: no     Segmental dysfunction pre-treatment: T10  L4-5  SI.     Assessment post-treatment:  Cervical: ROM increased.  Thoracic: ROM increased.  Lumbar: ROM increased, pain and tenderness decreased and muscle spasm decreased.    Comments: .      Complicating Factors: .    Plan / Procedure:    Expected release date: .  Treatment plan: 1 times per month.  Instructed patient: ice 20 minutes every other hour as needed and rest.  Short term goals: reduce pain.  Long term goals: restore normal function.  Prognosis: excellent.

## 2019-05-28 ENCOUNTER — TELEPHONE (OUTPATIENT)
Dept: FAMILY MEDICINE | Facility: OTHER | Age: 84
End: 2019-05-28

## 2019-05-28 NOTE — TELEPHONE ENCOUNTER
10:08 AM    Reason for Call: OVERBOOK    Patient is having the following symptoms: stomach problems for 1 weeks.    The patient is requesting an appointment for ASAP with .    Was an appointment offered for this call? No  If yes : Appointment type              Date    Preferred method for responding to this message: Telephone Call  What is your phone number ?[phone]    If we cannot reach you directly, may we leave a detailed response at the number you provided? Yes    Can this message wait until your PCP/provider returns, if unavailable today? Not applicable, pcp is in     [facility]

## 2019-05-28 NOTE — PROGRESS NOTES
SUBJECTIVE:   Kenyon Rivera is a 84 year old male who presents to clinic today for the following   health issues:    Constipation      Duration: 1 week    Description:       Frequency of bowel movements: 2-3 days    Intensity:  moderate    Accompanying signs and symptoms: none       Abdominal pain: YES- cramping       Rectal pain: no        Blood in stool: no        Nausea/vomitting: no     History:        Similar problems in past: YES    Precipitating or alleviating factors: none       Medications worsening symptoms: YES- pt started B12 supplements and thinks it is related    Therapies tried and outcome: Milk of Mag, Miralax       Chronic laxative use: no       PROBLEMS TO ADD ON...    Additional history: had the b12 supplemented and he has had constipation since.  He is needing 2 doses of milk of magnesium to get a result.  He takes about once a month otherwise.  We are changing and following that.      Reviewed  and updated as needed this visit by clinical staff         Reviewed and updated as needed this visit by Provider         Patient Active Problem List   Diagnosis     Advanced care planning/counseling discussion     Hypertension, Benign     Hyperlipidemia     Insomnia     Lumbago     Hypertrophy of prostate without urinary obstruction     ACP (advance care planning)     Controlled substance agreement signed     Blood glucose elevated     Anxiety     Past Surgical History:   Procedure Laterality Date     APPENDECTOMY       cataract extraction and lens implantation  3/2007    LEFT     CHOLECYSTECTOMY       electric stimulator implant       excision of Dupuytren's contracture extending into ring finger  02/08/2008    Bilateral     inguinal herniography      LEFT     NEPHRECTOMY  1992    RIGHT > Renal Cancer     OPEN REDUCTION INTERNAL FIXATION ANKLE      RIGHT     penile implant       TURP         Social History     Tobacco Use     Smoking status: Former Smoker     Packs/day: 2.00     Years: 26.00      Pack years: 52.00     Types: Cigarettes     Start date: 1949     Last attempt to quit: 1975     Years since quittin.3     Smokeless tobacco: Never Used     Tobacco comment: Tried to Quit (YES)   Substance Use Topics     Alcohol use: Yes     Comment: RARELY     Family History   Problem Relation Age of Onset     Diabetes Brother      C.A.D. Mother      Myocardial Infarction Mother      Heart Disease Mother         Heart Disease     Cancer Sister         Leukemia     Other - See Comments Other         Colitis         Current Outpatient Medications   Medication Sig Dispense Refill     acetaminophen (TYLENOL) 500 MG tablet Take 650 mg by mouth every 6 hours as needed        aspirin 81 MG tablet Take 81 mg by mouth daily        busPIRone (BUSPAR) 10 MG tablet Take 1 tablet (10 mg) by mouth 2 times daily 60 tablet 0     calcium carbonate (TUMS) 500 MG chewable tablet Take 1 chew tab by mouth every 4 hours as needed for heartburn       cetirizine (ZYRTEC) 10 MG tablet Take 10 mg by mouth daily as needed for allergies       Cholecalciferol (VITAMIN D3) 2000 UNITS CAPS Take by mouth daily       cyclobenzaprine (FLEXERIL) 10 MG tablet TAKE 1 TABLET(10 MG) BY MOUTH TWICE DAILY 180 tablet 3     fluticasone (FLONASE) 50 MCG/ACT nasal spray Spray 2 sprays into both nostrils daily 3 Bottle 11     Omega-3 Fatty Acids (FISH OIL) 1200 MG CAPS Take by mouth daily Fish oil 1290 omega 3 900       omeprazole 20 MG tablet Take 2 tablets by mouth daily        order for DME Tens unit and supplies 1 each 11     order for DME Elastic Back brace with stays and velcro enclosure 1 each 0     senna-docusate (SENOKOT-S;PERICOLACE) 8.6-50 MG per tablet Take 1 tablet by mouth daily       SIMETHICONE-80 PO Take 80 mg by mouth Every 4 hours as PRN       tamsulosin (FLOMAX) 0.4 MG 24 hr capsule Take 1 capsule by mouth At Bedtime.       temazepam (RESTORIL) 30 MG capsule Take 1 capsule (30 mg) by mouth nightly as needed 15 capsule 0      traMADol (ULTRAM) 50 MG tablet TAKE 1 TABLET BY MOUTH EVERY 8 HOURS AS NEEDED FOR PAIN 90 tablet 0     Allergies   Allergen Reactions     Chlorzoxazone      Parafon Forte       ROS:  Constitutional, HEENT, cardiovascular, pulmonary, gi and gu systems are negative, except as otherwise noted.    OBJECTIVE:                                                    /56   Pulse 99   Wt 88.9 kg (196 lb)   SpO2 94%   BMI 30.70 kg/m    Body mass index is 30.7 kg/m .  GENERAL APPEARANCE: Alert, no acute distress  CV: regular rate and rhythm, no murmur, rub or gallop  RESP: lungs clear to auscultation bilaterally  ABDOMEN: normal bowel sounds, soft, nontender, no hepatosplenomegaly or other masses  SKIN: no suspicious lesions or rashes to visualized skin  NEURO: Alert, oriented x 3, speech and mentation normal           ASSESSMENT/PLAN:                                                    1. Constipation, unspecified constipation type  Reviewed.  Down to 500 on the b12 and check that in a couple of months.      2. Vitamin B12 deficiency (non anemic)  Discussed this.  He had a low level but normal without a upplement.  We will check the hgb as well today.    - Vitamin B12; Future  - CBC with platelets and differential          Eric Bernal MD  [facility]

## 2019-05-30 ENCOUNTER — OFFICE VISIT (OUTPATIENT)
Dept: FAMILY MEDICINE | Facility: OTHER | Age: 84
End: 2019-05-30
Attending: FAMILY MEDICINE
Payer: MEDICARE

## 2019-05-30 VITALS
SYSTOLIC BLOOD PRESSURE: 110 MMHG | BODY MASS INDEX: 30.7 KG/M2 | WEIGHT: 196 LBS | HEART RATE: 99 BPM | DIASTOLIC BLOOD PRESSURE: 56 MMHG | OXYGEN SATURATION: 94 %

## 2019-05-30 DIAGNOSIS — E53.8 VITAMIN B12 DEFICIENCY (NON ANEMIC): ICD-10-CM

## 2019-05-30 DIAGNOSIS — K59.00 CONSTIPATION, UNSPECIFIED CONSTIPATION TYPE: Primary | ICD-10-CM

## 2019-05-30 LAB
BASOPHILS # BLD AUTO: 0.1 10E9/L (ref 0–0.2)
BASOPHILS NFR BLD AUTO: 0.6 %
DIFFERENTIAL METHOD BLD: ABNORMAL
EOSINOPHIL # BLD AUTO: 0.2 10E9/L (ref 0–0.7)
EOSINOPHIL NFR BLD AUTO: 2.7 %
ERYTHROCYTE [DISTWIDTH] IN BLOOD BY AUTOMATED COUNT: 15.5 % (ref 10–15)
HCT VFR BLD AUTO: 42 % (ref 40–53)
HGB BLD-MCNC: 13.9 G/DL (ref 13.3–17.7)
LYMPHOCYTES # BLD AUTO: 2.3 10E9/L (ref 0.8–5.3)
LYMPHOCYTES NFR BLD AUTO: 26.9 %
MCH RBC QN AUTO: 29 PG (ref 26.5–33)
MCHC RBC AUTO-ENTMCNC: 33.1 G/DL (ref 31.5–36.5)
MCV RBC AUTO: 88 FL (ref 78–100)
MONOCYTES # BLD AUTO: 0.9 10E9/L (ref 0–1.3)
MONOCYTES NFR BLD AUTO: 10.2 %
NEUTROPHILS # BLD AUTO: 5 10E9/L (ref 1.6–8.3)
NEUTROPHILS NFR BLD AUTO: 59.6 %
PLATELET # BLD AUTO: 373 10E9/L (ref 150–450)
RBC # BLD AUTO: 4.79 10E12/L (ref 4.4–5.9)
WBC # BLD AUTO: 8.5 10E9/L (ref 4–11)

## 2019-05-30 PROCEDURE — G0463 HOSPITAL OUTPT CLINIC VISIT: HCPCS

## 2019-05-30 PROCEDURE — 99000 SPECIMEN HANDLING OFFICE-LAB: CPT | Mod: ZL | Performed by: FAMILY MEDICINE

## 2019-05-30 PROCEDURE — 82607 VITAMIN B-12: CPT | Mod: ZL | Performed by: FAMILY MEDICINE

## 2019-05-30 PROCEDURE — 99213 OFFICE O/P EST LOW 20 MIN: CPT | Performed by: FAMILY MEDICINE

## 2019-05-30 PROCEDURE — 85025 COMPLETE CBC W/AUTO DIFF WBC: CPT | Mod: ZL | Performed by: FAMILY MEDICINE

## 2019-05-30 PROCEDURE — 36415 COLL VENOUS BLD VENIPUNCTURE: CPT | Mod: ZL | Performed by: FAMILY MEDICINE

## 2019-05-30 ASSESSMENT — PAIN SCALES - GENERAL: PAINLEVEL: NO PAIN (0)

## 2019-05-30 NOTE — NURSING NOTE
"Chief Complaint   Patient presents with     Constipation       Initial /56   Pulse 99   Wt 88.9 kg (196 lb)   SpO2 94%   BMI 30.70 kg/m   Estimated body mass index is 30.7 kg/m  as calculated from the following:    Height as of 4/8/19: 1.702 m (5' 7\").    Weight as of this encounter: 88.9 kg (196 lb).  Medication Reconciliation: complete    Gladis Hernandez LPN  "

## 2019-05-31 LAB — VIT B12 SERPL-MCNC: 642 PG/ML (ref 193–986)

## 2019-06-14 ENCOUNTER — TELEPHONE (OUTPATIENT)
Dept: FAMILY MEDICINE | Facility: OTHER | Age: 84
End: 2019-06-14

## 2019-06-14 DIAGNOSIS — E78.49 OTHER HYPERLIPIDEMIA: Primary | ICD-10-CM

## 2019-06-17 NOTE — PROGRESS NOTES
Occupational Visit     SUBJECTIVE:  Kenyon Rivera, 85 year old, male is seen for follow up of occupational injury. Date of injury is 5/31/1975.    Linked Episodes   Type: Episode: Status: Noted: Resolved: Last update: Updated by:   WORK COMP [employer] Active 5/31/1975 6/17/2019 11:21 AM Orly Domingo LPN      Comments:       Musculoskeletal problem/pain      Duration: 5/31/1975    Description  Location: low back    Intensity:  moderate, severe    Accompanying signs and symptoms: radiation of pain to both legs, numbness, tingling and weakness of the right foot. Thinks he needs a brace as his foot keeps folding under.    History  Previous similar problem: YES- ongoing    Previous evaluation:  x-ray but not anything recently    Precipitating or alleviating factors:  Trauma or overuse: YES- accident at work  Aggravating factors include: cold or damp weather mostly    Therapies tried and outcome: heat, ice, acetaminophen and physical therapy        Allergies   Allergen Reactions     Chlorzoxazone      Parafon Forte         Review of Systems:  Constitutional, HEENT, cardiovascular, pulmonary, gi and gu systems are negative, except as otherwise noted.      OBJECTIVE:  There were no vitals filed for this visit.            Exam:  GENERAL APPEARANCE: Alert, no acute distress  MSK:  Limping.  Walks with a bent over appearance.    SKIN: no suspicious lesions or rashes to visualized skin  NEURO: Alert, oriented x 3, speech and mentation normal        Labs: No results found for this or any previous visit (from the past 24 hour(s)).      ASSESSMENT/PLAN:    (M54.41) Bilateral low back pain with right-sided sciatica, unspecified chronicity  Comment: reviewed at length today.    Plan: traMADol (ULTRAM) 50 MG tablet, DME REFERRAL        Right ankle/leg weakness due to back and surgery history.  I sent the brace for the back and ankle, and the PT for traction.  This has worked int he past.  Refilled tramadol and  flexeril as well.  Reliable use.      (M25.929) Right ankle instability  Comment: as above.    Plan: order for DME        Brace at his request especially for uneven ground.  This is the foot that has the neurologic compromise.

## 2019-06-18 DIAGNOSIS — M54.40 BILATERAL LOW BACK PAIN WITH SCIATICA, SCIATICA LATERALITY UNSPECIFIED, UNSPECIFIED CHRONICITY: ICD-10-CM

## 2019-06-18 RX ORDER — TRAMADOL HYDROCHLORIDE 50 MG/1
TABLET ORAL
Qty: 90 TABLET | Refills: 0 | Status: SHIPPED | OUTPATIENT
Start: 2019-06-18 | End: 2019-07-09

## 2019-06-18 NOTE — TELEPHONE ENCOUNTER
traMADol (ULTRAM) 50 MG tablet   Last Written Prescription Date:  5/2/19  Last Fill Quantity: 90,   # refills: 0  Last Office Visit: 5/30/19  Future Office visit:    Next 5 appointments (look out 90 days)    Jul 09, 2019  1:30 PM CDT  (Arrive by 1:15 PM)  [facility]al [facility]  [facility] [address]  [facility] [place] [address]  [phone]

## 2019-06-24 ENCOUNTER — OFFICE VISIT (OUTPATIENT)
Dept: CHIROPRACTIC MEDICINE | Facility: OTHER | Age: 84
End: 2019-06-24
Attending: CHIROPRACTOR
Payer: OTHER MISCELLANEOUS

## 2019-06-24 DIAGNOSIS — M54.50 ACUTE BILATERAL LOW BACK PAIN WITHOUT SCIATICA: ICD-10-CM

## 2019-06-24 DIAGNOSIS — M99.02 SEGMENTAL AND SOMATIC DYSFUNCTION OF THORACIC REGION: ICD-10-CM

## 2019-06-24 DIAGNOSIS — M99.03 SEGMENTAL AND SOMATIC DYSFUNCTION OF LUMBAR REGION: Primary | ICD-10-CM

## 2019-06-24 PROCEDURE — 98941 CHIROPRACT MANJ 3-4 REGIONS: CPT | Mod: AT | Performed by: CHIROPRACTOR

## 2019-06-25 NOTE — PROGRESS NOTES
Subjective Finding:    Chief compalint: Patient presents with:  Back Pain: sharp pains  , Pain Scale: 5/10, Intensity: dull and ache, Duration: 3 weeks, Change since last visit: , Radiating: bilateral buttock.    Date of injury:     Activities that the pain restricts:   Home/household/hobbies/social activities: yes.  Work duties: yes.  Sleep: no.  Makes symptoms better: rest.  Makes symptoms worse: activity, lumbar extension and lumbar flexion.  Have you seen anyone else for the symptoms? no.  Work related: no  Automobile related injury: no.    Objective and Assessment:    Posture Analysis:   High shoulder: right.  Head tilt: right.  High iliac crest: right.  Head carriage: neutral.  Thoracic Kyphosis: neutral.  Lumbar Lordosis: forward.    Lumbar Range of Motion: flexion decreased, extension decreased, left lateral flexion decreased and right lateral flexion decreased.  Cervical Range of Motion: .  Thoracic Range of Motion: .  Extremity Range of Motion: .    Palpation:   Quad lumb: bilateral, referred pain: no     Segmental dysfunction pre-treatment: T10  L4-5  SI.     Assessment post-treatment:  Cervical: ROM increased.  Thoracic: ROM increased.  Lumbar: ROM increased, pain and tenderness decreased and muscle spasm decreased.    Comments: .      Complicating Factors: .    Plan / Procedure:    Expected release date: .  Treatment plan: 1 times per month.  Instructed patient: ice 20 minutes every other hour as needed and rest.  Short term goals: reduce pain.  Long term goals: restore normal function.  Prognosis: excellent.

## 2019-07-09 ENCOUNTER — OFFICE VISIT (OUTPATIENT)
Dept: FAMILY MEDICINE | Facility: OTHER | Age: 84
End: 2019-07-09
Attending: FAMILY MEDICINE
Payer: OTHER MISCELLANEOUS

## 2019-07-09 VITALS
BODY MASS INDEX: 30.38 KG/M2 | SYSTOLIC BLOOD PRESSURE: 118 MMHG | WEIGHT: 194 LBS | OXYGEN SATURATION: 95 % | DIASTOLIC BLOOD PRESSURE: 70 MMHG | HEART RATE: 98 BPM

## 2019-07-09 DIAGNOSIS — M54.41 BILATERAL LOW BACK PAIN WITH RIGHT-SIDED SCIATICA, UNSPECIFIED CHRONICITY: ICD-10-CM

## 2019-07-09 DIAGNOSIS — M25.371 RIGHT ANKLE INSTABILITY: ICD-10-CM

## 2019-07-09 DIAGNOSIS — G89.29 CHRONIC MIDLINE LOW BACK PAIN WITHOUT SCIATICA: Primary | ICD-10-CM

## 2019-07-09 DIAGNOSIS — M54.50 CHRONIC MIDLINE LOW BACK PAIN WITHOUT SCIATICA: Primary | ICD-10-CM

## 2019-07-09 PROCEDURE — 99213 OFFICE O/P EST LOW 20 MIN: CPT | Performed by: FAMILY MEDICINE

## 2019-07-09 RX ORDER — CYCLOBENZAPRINE HCL 10 MG
TABLET ORAL
Qty: 180 TABLET | Refills: 3 | Status: SHIPPED | OUTPATIENT
Start: 2019-07-09 | End: 2020-07-17

## 2019-07-09 RX ORDER — TRAMADOL HYDROCHLORIDE 50 MG/1
TABLET ORAL
Qty: 90 TABLET | Refills: 3 | Status: SHIPPED | OUTPATIENT
Start: 2019-07-09 | End: 2019-12-30

## 2019-07-09 ASSESSMENT — PAIN SCALES - GENERAL: PAINLEVEL: MODERATE PAIN (5)

## 2019-07-09 NOTE — NURSING NOTE
"Chief Complaint   Patient presents with     Work Comp       Initial /70   Pulse 98   Wt 88 kg (194 lb)   SpO2 95%   BMI 30.38 kg/m   Estimated body mass index is 30.38 kg/m  as calculated from the following:    Height as of 4/8/19: 1.702 m (5' 7\").    Weight as of this encounter: 88 kg (194 lb).  Medication Reconciliation: complete     Angela Rodriguez      "

## 2019-07-29 ENCOUNTER — TELEPHONE (OUTPATIENT)
Dept: FAMILY MEDICINE | Facility: OTHER | Age: 84
End: 2019-07-29

## 2019-07-30 ENCOUNTER — HOSPITAL ENCOUNTER (OUTPATIENT)
Dept: PHYSICAL THERAPY | Facility: HOSPITAL | Age: 84
Setting detail: THERAPIES SERIES
End: 2019-07-30
Attending: FAMILY MEDICINE
Payer: OTHER MISCELLANEOUS

## 2019-07-30 DIAGNOSIS — G89.29 CHRONIC MIDLINE LOW BACK PAIN WITHOUT SCIATICA: ICD-10-CM

## 2019-07-30 DIAGNOSIS — M54.50 CHRONIC MIDLINE LOW BACK PAIN WITHOUT SCIATICA: ICD-10-CM

## 2019-07-30 PROCEDURE — 97140 MANUAL THERAPY 1/> REGIONS: CPT | Mod: GP

## 2019-07-30 PROCEDURE — 97530 THERAPEUTIC ACTIVITIES: CPT | Mod: GP,59

## 2019-07-30 PROCEDURE — 97161 PT EVAL LOW COMPLEX 20 MIN: CPT | Mod: GP

## 2019-07-30 PROCEDURE — 97110 THERAPEUTIC EXERCISES: CPT | Mod: GP

## 2019-08-02 ENCOUNTER — HOSPITAL ENCOUNTER (OUTPATIENT)
Dept: PHYSICAL THERAPY | Facility: HOSPITAL | Age: 84
Setting detail: THERAPIES SERIES
End: 2019-08-02
Attending: FAMILY MEDICINE
Payer: OTHER MISCELLANEOUS

## 2019-08-02 PROCEDURE — 97140 MANUAL THERAPY 1/> REGIONS: CPT | Mod: GP
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
## 2019-08-07 ENCOUNTER — HOSPITAL ENCOUNTER (OUTPATIENT)
Dept: PHYSICAL THERAPY | Facility: HOSPITAL | Age: 84
Setting detail: THERAPIES SERIES
End: 2019-08-07
Attending: FAMILY MEDICINE
Payer: OTHER MISCELLANEOUS

## 2019-08-07 DIAGNOSIS — M54.41 BILATERAL LOW BACK PAIN WITH RIGHT-SIDED SCIATICA, UNSPECIFIED CHRONICITY: Primary | ICD-10-CM

## 2019-08-07 PROCEDURE — 97140 MANUAL THERAPY 1/> REGIONS: CPT | Mod: GP,59

## 2019-08-07 PROCEDURE — 97012 MECHANICAL TRACTION THERAPY: CPT | Mod: GP

## 2019-08-13 ENCOUNTER — HOSPITAL ENCOUNTER (OUTPATIENT)
Dept: PHYSICAL THERAPY | Facility: HOSPITAL | Age: 84
Setting detail: THERAPIES SERIES
End: 2019-08-13
Attending: FAMILY MEDICINE
Payer: OTHER MISCELLANEOUS

## 2019-08-13 PROCEDURE — 97012 MECHANICAL TRACTION THERAPY: CPT | Mod: GP

## 2019-08-13 PROCEDURE — 97140 MANUAL THERAPY 1/> REGIONS: CPT | Mod: GP,59

## 2019-08-20 ENCOUNTER — HOSPITAL ENCOUNTER (OUTPATIENT)
Dept: PHYSICAL THERAPY | Facility: HOSPITAL | Age: 84
Setting detail: THERAPIES SERIES
End: 2019-08-20
Attending: FAMILY MEDICINE
Payer: OTHER MISCELLANEOUS

## 2019-08-20 PROCEDURE — 97012 MECHANICAL TRACTION THERAPY: CPT | Mod: GP

## 2019-08-20 PROCEDURE — 97140 MANUAL THERAPY 1/> REGIONS: CPT | Mod: GP,59

## 2019-08-22 ENCOUNTER — HOSPITAL ENCOUNTER (OUTPATIENT)
Dept: PHYSICAL THERAPY | Facility: HOSPITAL | Age: 84
Setting detail: THERAPIES SERIES
End: 2019-08-22
Attending: FAMILY MEDICINE
Payer: OTHER MISCELLANEOUS

## 2019-08-22 PROCEDURE — 97012 MECHANICAL TRACTION THERAPY: CPT | Mod: GP

## 2019-08-22 PROCEDURE — 97140 MANUAL THERAPY 1/> REGIONS: CPT | Mod: GP

## 2019-08-27 ENCOUNTER — HOSPITAL ENCOUNTER (OUTPATIENT)
Dept: PHYSICAL THERAPY | Facility: HOSPITAL | Age: 84
Setting detail: THERAPIES SERIES
End: 2019-08-27
Attending: FAMILY MEDICINE
Payer: OTHER MISCELLANEOUS

## 2019-08-27 PROCEDURE — 97012 MECHANICAL TRACTION THERAPY: CPT | Mod: GP

## 2019-08-28 DIAGNOSIS — M54.41 BILATERAL LOW BACK PAIN WITH RIGHT-SIDED SCIATICA, UNSPECIFIED CHRONICITY: ICD-10-CM

## 2019-08-28 NOTE — TELEPHONE ENCOUNTER
Ultram   Last Written Prescription Date:  7/9/2019  Last Fill Quantity: 90,   # refills: 3  Last Office Visit: 7/9/2019  Future Office visit:    Next 5 appointments (look out 90 days)    Nov 14, 2019 10:30 AM CST  (Arrive by 10:15 AM)  [facility]  [facility] [address] [address]  [phone]           Routing refill request to provider for review/approval because:  Drug not on the [facility], [facility] or  Health refill protocol or controlled substance

## 2019-08-30 RX ORDER — TRAMADOL HYDROCHLORIDE 50 MG/1
TABLET ORAL
Qty: 90 TABLET | Refills: 0 | OUTPATIENT
Start: 2019-08-30

## 2019-09-03 ENCOUNTER — HOSPITAL ENCOUNTER (OUTPATIENT)
Dept: PHYSICAL THERAPY | Facility: HOSPITAL | Age: 84
Setting detail: THERAPIES SERIES
End: 2019-09-03
Attending: FAMILY MEDICINE
Payer: OTHER MISCELLANEOUS

## 2019-09-03 PROCEDURE — 97012 MECHANICAL TRACTION THERAPY: CPT | Mod: GP

## 2019-09-10 ENCOUNTER — HOSPITAL ENCOUNTER (OUTPATIENT)
Dept: PHYSICAL THERAPY | Facility: HOSPITAL | Age: 84
Setting detail: THERAPIES SERIES
End: 2019-09-10
Attending: FAMILY MEDICINE
Payer: OTHER MISCELLANEOUS

## 2019-09-10 PROCEDURE — 97012 MECHANICAL TRACTION THERAPY: CPT | Mod: GP

## 2019-09-12 ENCOUNTER — HOSPITAL ENCOUNTER (OUTPATIENT)
Dept: PHYSICAL THERAPY | Facility: HOSPITAL | Age: 84
Setting detail: THERAPIES SERIES
End: 2019-09-12
Attending: FAMILY MEDICINE
Payer: OTHER MISCELLANEOUS

## 2019-09-12 PROCEDURE — 97140 MANUAL THERAPY 1/> REGIONS: CPT | Mod: GP

## 2019-09-18 ENCOUNTER — HOSPITAL ENCOUNTER (OUTPATIENT)
Dept: PHYSICAL THERAPY | Facility: HOSPITAL | Age: 84
Setting detail: THERAPIES SERIES
End: 2019-09-18
Attending: FAMILY MEDICINE
Payer: OTHER MISCELLANEOUS

## 2019-09-18 PROCEDURE — 97012 MECHANICAL TRACTION THERAPY: CPT | Mod: GP

## 2019-09-18 PROCEDURE — 97140 MANUAL THERAPY 1/> REGIONS: CPT | Mod: GP

## 2019-09-20 ENCOUNTER — HOSPITAL ENCOUNTER (OUTPATIENT)
Dept: PHYSICAL THERAPY | Facility: HOSPITAL | Age: 84
Setting detail: THERAPIES SERIES
End: 2019-09-20
Attending: FAMILY MEDICINE
Payer: OTHER MISCELLANEOUS

## 2019-09-20 PROCEDURE — 97140 MANUAL THERAPY 1/> REGIONS: CPT | Mod: GP,59

## 2019-09-20 PROCEDURE — 97012 MECHANICAL TRACTION THERAPY: CPT | Mod: GP

## 2019-09-23 PROBLEM — F11.90 CHRONIC, CONTINUOUS USE OF OPIOIDS: Chronic | Status: ACTIVE | Noted: 2019-09-23

## 2019-10-08 ENCOUNTER — TELEPHONE (OUTPATIENT)
Dept: FAMILY MEDICINE | Facility: OTHER | Age: 84
End: 2019-10-08

## 2019-11-01 DIAGNOSIS — E78.49 OTHER HYPERLIPIDEMIA: ICD-10-CM

## 2019-11-01 LAB
ALBUMIN SERPL-MCNC: 3.5 G/DL (ref 3.4–5)
ALP SERPL-CCNC: 108 U/L (ref 40–150)
ALT SERPL W P-5'-P-CCNC: 16 U/L (ref 0–70)
ANION GAP SERPL CALCULATED.3IONS-SCNC: 5 MMOL/L (ref 3–14)
AST SERPL W P-5'-P-CCNC: 15 U/L (ref 0–45)
BILIRUB SERPL-MCNC: 0.3 MG/DL (ref 0.2–1.3)
BUN SERPL-MCNC: 20 MG/DL (ref 7–30)
CALCIUM SERPL-MCNC: 9.1 MG/DL (ref 8.5–10.1)
CHLORIDE SERPL-SCNC: 106 MMOL/L (ref 94–109)
CHOLEST SERPL-MCNC: 180 MG/DL
CO2 SERPL-SCNC: 28 MMOL/L (ref 20–32)
CREAT SERPL-MCNC: 1.5 MG/DL (ref 0.66–1.25)
GFR SERPL CREATININE-BSD FRML MDRD: 42 ML/MIN/{1.73_M2}
GLUCOSE SERPL-MCNC: 105 MG/DL (ref 70–99)
HDLC SERPL-MCNC: 37 MG/DL
LDLC SERPL CALC-MCNC: 112 MG/DL
NONHDLC SERPL-MCNC: 143 MG/DL
POTASSIUM SERPL-SCNC: 4.3 MMOL/L (ref 3.4–5.3)
PROT SERPL-MCNC: 7.7 G/DL (ref 6.8–8.8)
SODIUM SERPL-SCNC: 139 MMOL/L (ref 133–144)
TRIGL SERPL-MCNC: 154 MG/DL

## 2019-11-01 PROCEDURE — 80053 COMPREHEN METABOLIC PANEL: CPT | Mod: ZL | Performed by: FAMILY MEDICINE

## 2019-11-01 PROCEDURE — 80061 LIPID PANEL: CPT | Mod: ZL | Performed by: FAMILY MEDICINE

## 2019-11-01 PROCEDURE — 36415 COLL VENOUS BLD VENIPUNCTURE: CPT | Mod: ZL | Performed by: FAMILY MEDICINE

## 2019-11-07 NOTE — PROGRESS NOTES
Occupational Visit     SUBJECTIVE:  Kenyon Rivera, 85 year old, male is seen for follow up of occupational injury. Date of injury is 5/31/1975.    Linked Episodes   Type: Episode: Status: Noted: Resolved: Last update: Updated by:   WORK COMP [employer] Active 5/31/1975 11/7/2019  9:14 AM Orly Domingo LPN      Comments:       Musculoskeletal problem/pain      Duration: 5/31/1975    Description  Location: Low back    Intensity:  moderate    Accompanying signs and symptoms: numbness    History  Previous similar problem: YES  Previous evaluation:  x-ray    Precipitating or alleviating factors:  Trauma or overuse: YES- work injury  Aggravating factors include: none    Therapies tried and outcome: tramadol        Allergies   Allergen Reactions     Chlorzoxazone      Parafon Forte         Review of Systems:  Constitutional, HEENT, cardiovascular, pulmonary, gi and gu systems are negative, except as otherwise noted.      OBJECTIVE:  Vitals:    11/14/19 1015   BP: 132/70   Pulse: 106               Exam:  GENERAL: healthy, alert, well nourished, well hydrated, no distress  HENT: ear canals- normal; TMs- normal; Nose- normal; Mouth- no ulcers, no lesions  NECK: no tenderness, no adenopathy, no asymmetry, no masses, no stiffness; thyroid- normal to palpation  RESP: lungs clear to auscultation - no rales, no rhonchi, no wheezes  CV: regular rates and rhythm, normal S1 S2, no S3 or S4 and no murmur, no click or rub -  ABDOMEN: soft, no tenderness, no  hepatosplenomegaly, no masses, normal bowel sounds  Walks with slight limp favoring back.       Labs: No results found for this or any previous visit (from the past 24 hour(s)).      ASSESSMENT/PLAN:  (G89.29) Other chronic pain  (primary encounter diagnosis)  Comment: stable.    Plan: Pain Drug Scr UR W Rptd Meds        Update UDS.  No concerns there.     (M54.5,  G89.29) Chronic midline low back pain without sciatica  Comment: reviewed.    Plan: CHIROPRACTIC  REFERRAL        Ok for 1 tramadol tid prn.  He usually takes 2.  Chiropractic referral done.  Continue with same.  No change in cares.  All is stable.

## 2019-11-14 ENCOUNTER — OFFICE VISIT (OUTPATIENT)
Dept: FAMILY MEDICINE | Facility: OTHER | Age: 84
End: 2019-11-14
Attending: FAMILY MEDICINE
Payer: COMMERCIAL

## 2019-11-14 ENCOUNTER — OFFICE VISIT (OUTPATIENT)
Dept: FAMILY MEDICINE | Facility: OTHER | Age: 84
End: 2019-11-14
Attending: FAMILY MEDICINE
Payer: OTHER MISCELLANEOUS

## 2019-11-14 VITALS
SYSTOLIC BLOOD PRESSURE: 132 MMHG | HEIGHT: 67 IN | DIASTOLIC BLOOD PRESSURE: 70 MMHG | WEIGHT: 185 LBS | BODY MASS INDEX: 29.03 KG/M2 | HEART RATE: 106 BPM

## 2019-11-14 VITALS
HEART RATE: 106 BPM | WEIGHT: 185 LBS | SYSTOLIC BLOOD PRESSURE: 132 MMHG | DIASTOLIC BLOOD PRESSURE: 70 MMHG | HEIGHT: 67 IN | BODY MASS INDEX: 29.03 KG/M2

## 2019-11-14 DIAGNOSIS — M54.50 CHRONIC MIDLINE LOW BACK PAIN WITHOUT SCIATICA: ICD-10-CM

## 2019-11-14 DIAGNOSIS — Z00.00 ENCOUNTER FOR MEDICARE ANNUAL WELLNESS EXAM: Primary | ICD-10-CM

## 2019-11-14 DIAGNOSIS — K21.9 GASTROESOPHAGEAL REFLUX DISEASE WITHOUT ESOPHAGITIS: ICD-10-CM

## 2019-11-14 DIAGNOSIS — G89.29 OTHER CHRONIC PAIN: Primary | ICD-10-CM

## 2019-11-14 DIAGNOSIS — G89.29 CHRONIC MIDLINE LOW BACK PAIN WITHOUT SCIATICA: ICD-10-CM

## 2019-11-14 DIAGNOSIS — I10 ESSENTIAL HYPERTENSION, BENIGN: ICD-10-CM

## 2019-11-14 DIAGNOSIS — Z86.0100 HISTORY OF COLONIC POLYPS: ICD-10-CM

## 2019-11-14 DIAGNOSIS — N40.0 HYPERTROPHY OF PROSTATE WITHOUT URINARY OBSTRUCTION: ICD-10-CM

## 2019-11-14 PROBLEM — E11.9 DIABETES MELLITUS, TYPE 2 (H): Status: ACTIVE | Noted: 2019-11-14

## 2019-11-14 PROCEDURE — G0438 PPPS, INITIAL VISIT: HCPCS | Performed by: FAMILY MEDICINE

## 2019-11-14 PROCEDURE — 99213 OFFICE O/P EST LOW 20 MIN: CPT | Performed by: FAMILY MEDICINE

## 2019-11-14 PROCEDURE — 80307 DRUG TEST PRSMV CHEM ANLYZR: CPT | Mod: 90 | Performed by: FAMILY MEDICINE

## 2019-11-14 PROCEDURE — 99000 SPECIMEN HANDLING OFFICE-LAB: CPT | Performed by: FAMILY MEDICINE

## 2019-11-14 PROCEDURE — G0463 HOSPITAL OUTPT CLINIC VISIT: HCPCS

## 2019-11-14 RX ORDER — FAMOTIDINE 20 MG/1
20 TABLET, FILM COATED ORAL
Qty: 90 TABLET | Refills: 3 | Status: SHIPPED | OUTPATIENT
Start: 2019-11-14 | End: 2020-12-08

## 2019-11-14 ASSESSMENT — MIFFLIN-ST. JEOR
SCORE: 1474.84
SCORE: 1474.84

## 2019-11-14 ASSESSMENT — PAIN SCALES - GENERAL
PAINLEVEL: MILD PAIN (3)
PAINLEVEL: MILD PAIN (3)

## 2019-11-14 NOTE — PROGRESS NOTES
"  SUBJECTIVE:   Kenyon Rivera is a 85 year old male who presents for Preventive Visit.      Are you in the first 12 months of your Medicare Part B coverage?  No    Physical Health:    In general, how would you rate your overall physical health? good    Outside of work, how many days during the week do you exercise? 2-3 days/week    Outside of work, approximately how many minutes a day do you exercise?greater than 60 minutes    If you drink alcohol do you typically have >3 drinks per day or >7 drinks per week? No    Do you usually eat at least 4 servings of fruit and vegetables a day, include whole grains & fiber and avoid regularly eating high fat or \"junk\" foods? Yes    Do you have any problems taking medications regularly?  No    Do you have any side effects from medications? none    Needs assistance for the following daily activities: no assistance needed    Which of the following safety concerns are present in your home?  none identified     Hearing impairment: Yes, Need to ask people to speak up or repeat themselves.    In the past 6 months, have you been bothered by leaking of urine? no    Mental Health:    In general, how would you rate your overall mental or emotional health? good  PHQ-2 Score:      Do you feel safe in your environment? Yes    Have you ever done Advance Care Planning? (For example, a Health Directive, POLST, or a discussion with a medical provider or your loved ones about your wishes):     Additional concerns to address?  No    Fall risk:      Cognitive Screenin) Repeat 3 items (Leader, Season, Table)    2) Clock draw: NORMAL  3) 3 item recall: Recalls 2 objects   Results: NORMAL clock, 1-2 items recalled: COGNITIVE IMPAIRMENT LESS LIKELY    Mini-CogTM Copyright MONICA Zheng. Licensed by the author for use in [facility]; reprinted with permission (maurizio@.[facility]). All rights reserved.      Do you have sleep apnea, excessive snoring or daytime drowsiness?: no          Reviewed " and updated as needed this visit by clinical staff  Tobacco  Allergies         Reviewed and updated as needed this visit by Provider        Social History     Tobacco Use     Smoking status: Former Smoker     Packs/day: 2.00     Years: 26.00     Pack years: 52.00     Types: Cigarettes     Start date: 1949     Last attempt to quit: 1975     Years since quittin.8     Smokeless tobacco: Never Used     Tobacco comment: Tried to Quit (YES)   Substance Use Topics     Alcohol use: Yes     Comment: RARELY                           Current providers sharing in care for this patient include:   Patient Care Team:  Eric Bernal MD as PCP - General  Eric Bernal MD as Assigned PCP    The following health maintenance items are reviewed in Epic and correct as of today:  Health Maintenance   Topic Date Due     URINE DRUG SCREEN  1934     ZOSTER IMMUNIZATION (1 of 2) 1984     DTAP/TDAP/TD IMMUNIZATION (2 - Td) 10/13/2018     INFLUENZA VACCINE (1) 2019     TIMMY ASSESSMENT  2019     MEDICARE ANNUAL WELLNESS VISIT  2019     PHQ-9  2019     FALL RISK ASSESSMENT  2020     ALT  2020     BMP  2020     LIPID  2020     COLONOSCOPY  2021     ADVANCE CARE PLANNING  06/15/2021     PNEUMOCOCCAL IMMUNIZATION 65+ LOW/MEDIUM RISK  Completed     IPV IMMUNIZATION  Aged Out     MENINGITIS IMMUNIZATION  Aged Out     Lab work is in process      ROS:  CONSTITUTIONAL: NEGATIVE for fever, chills, change in weight  INTEGUMENTARY/SKIN: NEGATIVE for worrisome rashes, moles or lesions  EYES: NEGATIVE for vision changes or irritation  ENT/MOUTH: NEGATIVE for ear, mouth and throat problems  RESP: NEGATIVE for significant cough or SOB  BREAST: NEGATIVE for masses, tenderness or discharge  CV: NEGATIVE for chest pain, palpitations or peripheral edema  GI: NEGATIVE for nausea, abdominal pain, heartburn, or change in bowel habits  : NEGATIVE for frequency, dysuria, or  "hematuria  MUSCULOSKELETAL: NEGATIVE for significant arthralgias or myalgia  NEURO: NEGATIVE for weakness, dizziness or paresthesias  ENDOCRINE: NEGATIVE for temperature intolerance, skin/hair changes  HEME: NEGATIVE for bleeding problems  PSYCHIATRIC: NEGATIVE for changes in mood or affect    OBJECTIVE:   /70   Pulse 106   Ht 1.689 m (5' 6.5\")   Wt 83.9 kg (185 lb)   BMI 29.41 kg/m   Estimated body mass index is 29.41 kg/m  as calculated from the following:    Height as of this encounter: 1.689 m (5' 6.5\").    Weight as of this encounter: 83.9 kg (185 lb).  EXAM:   GENERAL: healthy, alert and no distress  EYES: Eyes grossly normal to inspection, PERRL and conjunctivae and sclerae normal  HENT: ear canals and TM's normal, nose and mouth without ulcers or lesions  NECK: no adenopathy, no asymmetry, masses, or scars and thyroid normal to palpation  RESP: lungs clear to auscultation - no rales, rhonchi or wheezes  CV: regular rate and rhythm, normal S1 S2, no S3 or S4, no murmur, click or rub, no peripheral edema and peripheral pulses strong  ABDOMEN: soft, nontender, no hepatosplenomegaly, no masses and bowel sounds normal  MS: no gross musculoskeletal defects noted, no edema  SKIN: no suspicious lesions or rashes  NEURO: Normal strength and tone, mentation intact and speech normal  PSYCH: mentation appears normal, affect normal/bright    Diagnostic Test Results:  Labs reviewed in Epic    ASSESSMENT / PLAN:       ICD-10-CM    1. Encounter for Medicare annual wellness exam Z00.00    2. History of colonic polyps Z86.010 GENERAL SURG ADULT REFERRAL   3. Hypertension, Benign I10    4. Gastroesophageal reflux disease without esophagitis K21.9 famotidine (PEPCID) 20 MG tablet       COUNSELING:  Reviewed preventive health counseling, as reflected in patient instructions    Estimated body mass index is 29.41 kg/m  as calculated from the following:    Height as of this encounter: 1.689 m (5' 6.5\").    Weight as of " this encounter: 83.9 kg (185 lb).         reports that he quit smoking about 44 years ago. His smoking use included cigarettes. He started smoking about 70 years ago. He has a 52.00 pack-year smoking history. He has never used smokeless tobacco.      Appropriate preventive services were discussed with this patient, including applicable screening as appropriate for cardiovascular disease, diabetes, osteopenia/osteoporosis, and glaucoma.  As appropriate for age/gender, discussed screening for colorectal cancer, prostate cancer, breast cancer, and cervical cancer. Checklist reviewing preventive services available has been given to the patient.    Reviewed patients plan of care and provided an AVS. The Basic Care Plan (routine screening as documented in Health Maintenance) for Kenyon meets the Care Plan requirement. This Care Plan has been established and reviewed with the Patient.    Counseling Resources:  ATP IV Guidelines  Pooled Cohorts Equation Calculator  Breast Cancer Risk Calculator  FRAX Risk Assessment  ICSI Preventive Guidelines  Dietary Guidelines for Americans, 2010  USDA's MyPlate  ASA Prophylaxis  Lung CA Screening    Eric Bernal MD  [facility]

## 2019-11-14 NOTE — PATIENT INSTRUCTIONS
Patient Education   Personalized Prevention Plan  You are due for the preventive services outlined below.  Your care team is available to assist you in scheduling these services.  If you have already completed any of these items, please share that information with your care team to update in your medical record.  Health Maintenance Due   Topic Date Due     URINE DRUG SCREEN  05/31/1934     Zoster (Shingles) Vaccine (1 of 2) 05/31/1984     Diptheria Tetanus Pertussis (DTAP/TDAP/TD) Vaccine (2 - Td) 10/13/2018     Flu Vaccine (1) 09/01/2019     Anxiety Assessment  11/07/2019     Annual Wellness Visit  11/07/2019     Depression Assessment  11/07/2019

## 2019-11-14 NOTE — NURSING NOTE
"Chief Complaint   Patient presents with     Work Comp       Initial /70   Pulse 106   Ht 1.689 m (5' 6.5\")   Wt 83.9 kg (185 lb)   BMI 29.41 kg/m   Estimated body mass index is 29.41 kg/m  as calculated from the following:    Height as of this encounter: 1.689 m (5' 6.5\").    Weight as of this encounter: 83.9 kg (185 lb).  Medication Reconciliation: complete  Gladis Hernandez LPN  "

## 2019-11-14 NOTE — NURSING NOTE
"Chief Complaint   Patient presents with     Physical       Initial /70   Pulse 106   Ht 1.689 m (5' 6.5\")   Wt 83.9 kg (185 lb)   BMI 29.41 kg/m   Estimated body mass index is 29.41 kg/m  as calculated from the following:    Height as of this encounter: 1.689 m (5' 6.5\").    Weight as of this encounter: 83.9 kg (185 lb).  Medication Reconciliation: complete  Gladis Hernandez LPN  "

## 2019-11-18 ENCOUNTER — PREP FOR PROCEDURE (OUTPATIENT)
Dept: SURGERY | Facility: OTHER | Age: 84
End: 2019-11-18

## 2019-11-18 ENCOUNTER — OFFICE VISIT (OUTPATIENT)
Dept: SURGERY | Facility: OTHER | Age: 84
End: 2019-11-18
Attending: FAMILY MEDICINE
Payer: COMMERCIAL

## 2019-11-18 VITALS
HEART RATE: 100 BPM | TEMPERATURE: 98 F | OXYGEN SATURATION: 95 % | DIASTOLIC BLOOD PRESSURE: 74 MMHG | BODY MASS INDEX: 30.45 KG/M2 | WEIGHT: 194 LBS | HEIGHT: 67 IN | SYSTOLIC BLOOD PRESSURE: 120 MMHG

## 2019-11-18 DIAGNOSIS — R19.4 CHANGE IN BOWEL HABITS: ICD-10-CM

## 2019-11-18 DIAGNOSIS — Z12.11 SPECIAL SCREENING FOR MALIGNANT NEOPLASMS, COLON: Primary | ICD-10-CM

## 2019-11-18 DIAGNOSIS — R10.84 ABDOMINAL PAIN, GENERALIZED: Primary | ICD-10-CM

## 2019-11-18 PROCEDURE — G0463 HOSPITAL OUTPT CLINIC VISIT: HCPCS

## 2019-11-18 PROCEDURE — 99204 OFFICE O/P NEW MOD 45 MIN: CPT | Performed by: SURGERY

## 2019-11-18 ASSESSMENT — PAIN SCALES - GENERAL: PAINLEVEL: MILD PAIN (2)

## 2019-11-18 ASSESSMENT — MIFFLIN-ST. JEOR: SCORE: 1515.67

## 2019-11-18 NOTE — NURSING NOTE
"Chief Complaint   Patient presents with     Consult     Colonoscopy consult- 3 year follow up-history of polyps-[facility]       Initial /74   Pulse 100   Temp 98  F (36.7  C)   Ht 1.689 m (5' 6.5\")   Wt 88 kg (194 lb)   SpO2 95%   BMI 30.84 kg/m   Estimated body mass index is 30.84 kg/m  as calculated from the following:    Height as of this encounter: 1.689 m (5' 6.5\").    Weight as of this encounter: 88 kg (194 lb).  Medication Reconciliation: complete  RAYMUNDO GILLETTE LPN    "

## 2019-11-18 NOTE — PATIENT INSTRUCTIONS
Thank you for allowing Dr. Bailey and our surgical team to participate in your care.     GUIDE TO YOUR COLONOSCOPY WITH GATORADE PREP and UPPER ENDOSCOPY WITH BIOPSY  At [facility], we want to make sure that your procedure is as pleasant as possible. This guide is designed to answer any questions you might have and to walk you through the preparations you will need to make before your procedure. Should you have additional questions, please feel free to contact us. Contact numbers are listed below. Thank you for choosing [facility].     Clinic Health Unit Coordinator: [phone]  Clinic Nurse: [phone]  Surgery Education Nurse: [phone]     Date of Procedure: Thursday, December 5, 2019    Admit Time: Surgery Department will call you the day before your procedure by 5pm with your arrival time. If your surgery is on Monday, expect a call on Friday. If we were unable to reach you before 5PM, you may call admitting at [phone].     All questions or concerns can be directed to the clinic or surgery education nurse. If you have a scheduling or appointment question, or need to postpone your procedure, please call the Health Unit Coordinator between 8am and 4pm Monday through Friday. After hours or on weekends, please call [phone] to postpone.      Call the surgery nurse or your primary care provider if you should become ill within 1-2 weeks of your procedure and we will reschedule it when you are healthy. This includes signs or symptoms of a cold or the flu, fever, chills, sore throat, cough, chest congestion, productive cough, runny nose, cold symptoms or any other symptoms of illness.     COLONOSCOPY PREP     7 DAYS BEFORE THE EXAM:  Starting November 28, 2019  Do not take Aspirin or other NSAIDS (Ibuprofen, Motrin, Aleve, Celebrex, Naproxen, etc) 7 days before your surgery. Tylenol is fine.  Stop taking fiber supplements, vitamins, and iron. Stop eating  "corn, nuts and seeds.  If you are prescribed a daily 81mg Aspirin, you may continue this.  If you are prescribed blood thinners (Aspirin, Coumadin/Warfarin, Plavix, etc) talk to your primary care provider. If you are a diabetic and take insulin, talk to your primary care provider.      Please call the Surgery Education Nurse at [phone] 1-2 weeks before your procedure and have an allergy and medication list ready      Arrange transportation with a responsible adult to drive you home and to stay with you for the next 4 hours after you arrive home for your safety. If you need to take a taxi or the bus, you must have a responsible adult to ride with you other than the . You will be cancelled if you do not have a responsible adult.    You have been ordered Gatorade Prep as your mechanical bowel prep. This is over the counter and does not require a prescription. Please purchase the following items:                 Two 5 mg Dulcolax (bisacodyl) tablets              One 8.3 ounce bottle of miralax              One 10 ounce bottle of magnesium citrate              One 64 ounce bottle of gatorade-No red or purple, not powdered.     2 DAYS BEFORE THE EXAM:  December 3, 2019  Begin a low fiber diet. No raw fruits and vegetables or whole grains. Please see the list of foods you can have and foods to avoid on page 3 of the separate \"Miralax, Dulcolax and Magnesium Citrate\" colonoscopy instruction packet. Drink at least 4-6 large glasses of sports drink each day until the procedure (not red or purple).     1 DAY BEFORE THE EXAM: December 4, 2019  No solid food or milk products after 12:00 AM (midnight). Drink only clear liquids all day, at least 8-10 glasses. Please see the list of liquids you can have and what to avoid on page 2 of the separate \"Miralax, Dulcolax and Magnesium Citrate\" colonoscopy instruction packet.  No red or purple colors, milk products, or alcohol.      AT 12:00 PM NOON THE DAY BEFORE EXAM:  " "December 4, 2019  Take 2 Dulcolax tablets by mouth with clear liquids.     AT 6:00 PM THE DAY BEFORE EXAM:  December 4, 2019  Mix the bottle of Miralax and 64 ounces of Gatorade in a pitcher. Drink one 8 ounce glass every 15 minutes until gone. Stay near a toilet. You will have watery stools and may have cramping, bloating and nausea.        DAY OF COLONOSCOPY EXAM:  December 5, 2019  6 HOURS PRIOR TO EXAM DAY OF PROCEDURE:  Drink the bottle of magnesium citrate. Right after drinking this, drink one full glass of water. You many have clear liquids up until 4 hours before you check in at admitting.  If you need to take any medications after this, take them with a tiny sip of water. If you have asthma, bring your inhaler with you.     Wear comfortable clothes. No jewelry, body piercings, make-up, nail polish, hair spray, lotions, perfumes or colognes. Shower before you arrive. [place] in Admitting through the [facility] Entrance. You must have a  with you and and adult available to stay with your for 4 hours at home. The medicine used in this test will make you sleepy. If you do not have someone, please reschedule or your test will be cancelled.  It is recommended that you do not drive for 24 hours after your test. Do not operate power equipment, drink alcohol, make important decisions or sign legal documents.      COLONOSCOPY FREQUENTLY ASKED QUESTIONS  What is a colonoscopy?        A colonoscopy is a test to look at the lining of your large intestine. The purpose of the exam is to check for abnormalities including growths called \"polyps\" that can lead to serious disease. A flexible scope is inserted into your rectum by the doctor to examine your large intestine.     What are polyps?  Polyps are abnormal growths on the lining of the colon. Most polyps are not cancerous, but some polyps have the potential to turn into cancer with time. Polyps can also bleed. For these reasons, most polyps are removed during a " colonoscopy and sent to the laboratory for microscopic examination.     What preparation is needed?  The colon must be completely clean for the procedure to be performed. You may be given one or two different prep solutions to cleanse your bowel. You will also need to follow a clear liquid diet the day before your procedure.     What happens after the procedure?  After your procedure is complete, you will be taken back to your day surgery room where you will be monitored for approximately 1 hour. You can expect to feel drowsy for several hours afterward. You may experience some cramping or bloating due to the air introduced into your colon during the exam. You will not be able to drive or operate machinery the rest of the day. You will be given written discharge instructions and appropriate learning material before you go home. You must have an adult to stay at home with you for the next 4 hours after you leave the hospital for your safety.     When will I find out the results of my test?  Your surgeon will talk to you and your designated  before you leave and usually the preliminary results can be given to you at that time. If a biopsy was taken during your procedure, it will be sent to the laboratory for examination. Results usually take one week. You will be contacted by phone or by letter with results.        TIPS FOR COLON CLEANSING BEFORE YOUR COLONOSCOPY  To get accurate results from your exam, your colon must be completely clean and empty. Please follow your doctor's instructions. If you do not, you may need to repeat both the exam and colon-cleansing process.     The medicine you take may cause bloating, nausea and other discomfort. Follow these tips to make the process as easy as possible:                You may use alcohol-free baby wipes to ease anal irritation. You may also use Vaseline to help protect the skin. Other options include Tucks wipes, hemorrhoid treatments and hydrocortisone  cream.      You may wish to squeeze some lemon juice into your preparation or add a packet of Crystal Light lemon-lime or ice tea flavor. (remember to not use red or purple)     To chill the solution, put it in your refrigerator or set it in a bowl of ice. Do not add ice in your glass. You may remove the preparation from the refrigerator 15-30 minutes before drinking.     Quickly drink one whole glass every 5 to 10 minutes. It may help to use a timer. If the liquid is too salty, use a straw.     Stay near a toilet! You will have diarrhea (loose watery stools) and may also have chills. Dress for comfort. Expect to feel discomfort until the stool clears from your colon. This usually takes about 2 to 4 hours.     Even when you are sitting on the toilet, keep drinking a glass of solution every 10-15 minutes. If you have nausea or vomiting, rinse your mouth with water. Take a break for 15 to 30 minutes, and then keep drinking the solution.     Some people find it helpful to suck on a wedge of lemon or lime. You may also try sucking on hard candy (not red or purple) or washing your mouth out with water, clear soda or mouthwash.     If you followed your doctor's orders and your stool is a clear or yellow liquid, you are ready for the exam. If you are not sure if your colon is clean, please call your clinic and ask to speak to a nurse.         Sincerely,     Zachariah Bailey MD

## 2019-11-19 PROBLEM — R19.4 CHANGE IN BOWEL HABITS: Status: ACTIVE | Noted: 2019-11-19

## 2019-11-19 PROBLEM — R10.84 ABDOMINAL PAIN, GENERALIZED: Status: ACTIVE | Noted: 2019-11-19

## 2019-11-19 RX ORDER — BISACODYL 5 MG/1
5 TABLET, DELAYED RELEASE ORAL DAILY PRN
Qty: 4 TABLET | Refills: 0 | Status: SHIPPED | OUTPATIENT
Start: 2019-11-19 | End: 2021-05-18

## 2019-11-19 NOTE — PROGRESS NOTES
[facility] Surgery Consultation    CC:  Abdominal pain, change in stool habits     HPI:  This 85 year old year old male is seen at the request of Dr. Bernal for evaluation of change in bowel habits and abdominal pain. He reports over the last several weeks he has had change in his bowel habits where it has become more difficult to have a bowel movement. He did have a colonoscopy in 2016 with a polyp and what appears to be a poor prep. He does have sigmoid diverticulosis and enlarged hemorrhoids. He has chronic back pain from a work injury 40 years ago. He denies any bleeding. He has been also complaining of epigastric abdominal pain. No nausea or vomiting. He does take pepcid daily for reflux.     Past Medical History:   Diagnosis Date     Calculus of gallbladder with other cholecystitis, without mention of obstruction 11/18/2004     Displacement of lumbar intervertebral disc without myelopathy 11/17/2000     Hyperplasia of prostate 01/04/2000     Hypertension, Benign 07/11/2011     Insomnia, unspecified 03/17/2011     Lumbago 11/02/1999     Nonallopathic lesion of cervical region, not elsewhere classified 12/19/2001     Nonallopathic lesion of lumbar region, not elsewhere classified 03/05/2003     Nonallopathic lesion of thoracic region, not elsewhere classified 11/17/2000     Other and unspecified hyperlipidemia 12/14/1999     Other diseases of respiratory system, not elsewhere classified 04/21/2004     Other malaise and fatigue 04/05/2006     Pneumonia, organism unspecified(486) 12/13/1999       Past Surgical History:   Procedure Laterality Date     APPENDECTOMY       cataract extraction and lens implantation  3/2007    LEFT     CHOLECYSTECTOMY       electric stimulator implant       excision of Dupuytren's contracture extending into ring finger  02/08/2008    Bilateral     inguinal herniography      LEFT     NEPHRECTOMY  1992    RIGHT > Renal Cancer     OPEN REDUCTION INTERNAL FIXATION ANKLE      RIGHT      "penile implant       TURP         Allergies   Allergen Reactions     Chlorzoxazone      Parafon Curtise       Current Outpatient Medications   Medication     acetaminophen (TYLENOL) 500 MG tablet     aspirin 81 MG tablet     bisacodyl (DULCOLAX) 5 MG EC tablet     busPIRone (BUSPAR) 10 MG tablet     calcium carbonate (TUMS) 500 MG chewable tablet     cetirizine (ZYRTEC) 10 MG tablet     Cholecalciferol (VITAMIN D3) 2000 UNITS CAPS     cyclobenzaprine (FLEXERIL) 10 MG tablet     famotidine (PEPCID) 20 MG tablet     fluticasone (FLONASE) 50 MCG/ACT nasal spray     Omega-3 Fatty Acids (FISH OIL) 1200 MG CAPS     omeprazole 20 MG tablet     order for DME     order for DME     order for DME     polyethylene glycol (GOLYTELY) 236 g suspension     senna-docusate (SENOKOT-S;PERICOLACE) 8.6-50 MG per tablet     SIMETHICONE-80 PO     tamsulosin (FLOMAX) 0.4 MG 24 hr capsule     temazepam (RESTORIL) 30 MG capsule     traMADol (ULTRAM) 50 MG tablet     No current facility-administered medications for this visit.        HABITS:    Social History     Tobacco Use     Smoking status: Former Smoker     Packs/day: 2.00     Years: 26.00     Pack years: 52.00     Types: Cigarettes     Start date: 1949     Last attempt to quit: 1975     Years since quittin.8     Smokeless tobacco: Never Used     Tobacco comment: Tried to Quit (YES)   Substance Use Topics     Alcohol use: Yes     Comment: RARELY     Drug use: No       Family History   Problem Relation Age of Onset     Diabetes Brother      C.A.D. Mother      Myocardial Infarction Mother      Heart Disease Mother         Heart Disease     Cancer Sister         Leukemia     Other - See Comments Other         Colitis       REVIEW OF SYSTEMS:  Ten point review of systems negative except those mentioned in the HPI.     OBJECTIVE:    /74   Pulse 100   Temp 98  F (36.7  C)   Ht 1.689 m (5' 6.5\")   Wt 88 kg (194 lb)   SpO2 95%   BMI 30.84 kg/m      GENERAL: Generally " appears well, in no distress with appropriate affect.  HEENT:   Sclerae anicteric - normocephalic atraumatic   Respiratory:  No acute distress, no splinting, CTAB    Cardiovascular:  tachy Rate and regular Rhythm, no murmur   Abdomen: non-distended  :  deferred  Extremities:  Extremities normal. No deformities, edema, or skin discoloration.  Skin:  no suspicious lesions or rashes  Neurological: grossly intact  Psych:  Alert, oriented, affect appropriate with normal decision making ability.    IMPRESSION:    85 y.o. male presents with change in bowel habits as well as epigastric abdominal pain that would be consistent with gastritis. I discussed that I believe we could do both an upper and lower endoscopy safely. We discussed the risks and the benefits and he agrees to proceed.     PLAN:    Upper and lower endoscopy.     Zachariah Bailey MD,     11/19/2019  2:06 PM

## 2019-11-22 LAB — PAIN DRUG SCR UR W RPTD MEDS: NORMAL

## 2019-12-02 ENCOUNTER — ANESTHESIA EVENT (OUTPATIENT)
Dept: SURGERY | Facility: HOSPITAL | Age: 84
End: 2019-12-02
Payer: MEDICARE

## 2019-12-02 ASSESSMENT — LIFESTYLE VARIABLES: TOBACCO_USE: 1

## 2019-12-02 NOTE — ANESTHESIA PREPROCEDURE EVALUATION
Anesthesia Pre-Procedure Evaluation    Patient: Kenyon Rivera   MRN: [id number] : 1934          Preoperative Diagnosis: Abdominal pain, generalized [R10.84]  Change in bowel habits [R19.4]    Procedure(s):  UPPER ENDOSCOPY WITH POSSIBLE BIOPSIES, DIAGNOSTIC COLONOSCOPY WITH POSSIBLE BIOPSY, POSSIBLE POLYPECTOMY    Past Medical History:   Diagnosis Date     Calculus of gallbladder with other cholecystitis, without mention of obstruction 2004     Displacement of lumbar intervertebral disc without myelopathy 2000     Hyperplasia of prostate 2000     Hypertension, Benign 2011     Insomnia, unspecified 2011     Lumbago 1999     Nonallopathic lesion of cervical region, not elsewhere classified 2001     Nonallopathic lesion of lumbar region, not elsewhere classified 2003     Nonallopathic lesion of thoracic region, not elsewhere classified 2000     Other and unspecified hyperlipidemia 1999     Other diseases of respiratory system, not elsewhere classified 2004     Other malaise and fatigue 2006     Pneumonia, organism unspecified(486) 1999     Past Surgical History:   Procedure Laterality Date     APPENDECTOMY       cataract extraction and lens implantation  3/2007    LEFT     CHOLECYSTECTOMY       electric stimulator implant       excision of Dupuytren's contracture extending into ring finger  2008    Bilateral     inguinal herniography      LEFT     NEPHRECTOMY      RIGHT > Renal Cancer     OPEN REDUCTION INTERNAL FIXATION ANKLE      RIGHT     penile implant       TURP         Anesthesia Evaluation     . Pt has had prior anesthetic.     No history of anesthetic complications          ROS/MED HX    ENT/Pulmonary:     (+)tobacco use, Past use , recent URI . .    Neurologic:     (+)other neuro insomnnia    Cardiovascular:     (+) Dyslipidemia, hypertension----. : . . . :. Irregular Heartbeat/Palpitations (tachycardia), .  Previous cardiac testing date:results:date: results:ECG reviewed date:1/26/2018 results:NSR @72 date: results:          METS/Exercise Tolerance:  >4 METS   Hematologic:  - neg hematologic  ROS       Musculoskeletal:   (+)  other musculoskeletal- back pain      GI/Hepatic:     (+) bowel prep, cholecystitis/cholelithiasis, Other GI/Hepatic abd pain, change in bowel habits      Renal/Genitourinary:     (+) BPH, Other Renal/ Genitourinary, 1 kidney      Endo:     (+) Other Endocrine Disorder Blood glucose elevated.      Psychiatric:     (+) psychiatric history anxiety      Infectious Disease:  - neg infectious disease ROS       Malignancy:   (+) Malignancy History of Other and Skin  Skin CA Remission status post, Other CA kidney, skin Remission status post         Other: Comment: Controlled substance agreement signed   (+) No chance of pregnancy C-spine cleared: N/A, H/O Chronic Pain,H/O chronic opiod use ,                         Physical Exam  Normal systems: cardiovascular and pulmonary    Airway   Mallampati: I  TM distance: >3 FB  Neck ROM: limited    Dental   (+) upper dentures and lower dentures    Cardiovascular   Rhythm and rate: regular and normal      Pulmonary    breath sounds clear to auscultation            Lab Results   Component Value Date    WBC 8.5 05/30/2019    HGB 13.9 05/30/2019    HCT 42.0 05/30/2019     05/30/2019    CRP 5.5 04/15/2015    SED 27 (H) 04/15/2015     11/01/2019    POTASSIUM 4.3 11/01/2019    CHLORIDE 106 11/01/2019    CO2 28 11/01/2019    BUN 20 11/01/2019    CR 1.50 (H) 11/01/2019     (H) 11/01/2019    LAKEISHA 9.1 11/01/2019    MAG 2.0 04/15/2015    ALBUMIN 3.5 11/01/2019    PROTTOTAL 7.7 11/01/2019    ALT 16 11/01/2019    AST 15 11/01/2019    ALKPHOS 108 11/01/2019    BILITOTAL 0.3 11/01/2019    TSH 3.40 03/21/2019       Preop Vitals  BP Readings from Last 3 Encounters:   11/18/19 120/74   11/14/19 132/70   11/14/19 132/70    Pulse Readings from Last 3 Encounters:  "  11/18/19 100   11/14/19 106   11/14/19 106      Resp Readings from Last 3 Encounters:   02/15/19 20   11/07/18 18   08/14/18 16    SpO2 Readings from Last 3 Encounters:   11/18/19 95%   07/09/19 95%   05/30/19 94%      Temp Readings from Last 1 Encounters:   11/18/19 98  F (36.7  C)    Ht Readings from Last 1 Encounters:   11/18/19 1.689 m (5' 6.5\")      Wt Readings from Last 1 Encounters:   11/18/19 88 kg (194 lb)    Estimated body mass index is 30.84 kg/m  as calculated from the following:    Height as of 11/18/19: 1.689 m (5' 6.5\").    Weight as of 11/18/19: 88 kg (194 lb).       Anesthesia Plan      History & Physical Review  History and physical reviewed and following examination; no interval change.    ASA Status:  3 .    NPO Status:  > 8 hours    Plan for MAC with Intravenous and Propofol induction. Maintenance will be TIVA.  Reason for MAC:  Deep or markedly invasive procedure (G8)    Surgery consult H&P 11/19      Postoperative Care      Consents  Anesthetic plan, risks, benefits and alternatives discussed with:  Patient..                 JULIENNE Argueta CRNA  "

## 2019-12-05 ENCOUNTER — HOSPITAL ENCOUNTER (OUTPATIENT)
Facility: HOSPITAL | Age: 84
Discharge: HOME | End: 2019-12-05
Attending: SURGERY | Admitting: SURGERY
Payer: MEDICARE

## 2019-12-05 ENCOUNTER — ANESTHESIA (OUTPATIENT)
Dept: SURGERY | Facility: HOSPITAL | Age: 84
End: 2019-12-05
Payer: MEDICARE

## 2019-12-05 VITALS
WEIGHT: 194 LBS | RESPIRATION RATE: 18 BRPM | HEIGHT: 67 IN | OXYGEN SATURATION: 92 % | DIASTOLIC BLOOD PRESSURE: 74 MMHG | TEMPERATURE: 99.5 F | BODY MASS INDEX: 30.45 KG/M2 | SYSTOLIC BLOOD PRESSURE: 135 MMHG | HEART RATE: 86 BPM

## 2019-12-05 DIAGNOSIS — R19.4 CHANGE IN BOWEL HABITS: ICD-10-CM

## 2019-12-05 DIAGNOSIS — R10.84 ABDOMINAL PAIN, GENERALIZED: ICD-10-CM

## 2019-12-05 PROCEDURE — 27210794 ZZH OR GENERAL SUPPLY STERILE: Performed by: SURGERY

## 2019-12-05 PROCEDURE — 40000305 ZZH STATISTIC PRE PROC ASSESS I: Performed by: SURGERY

## 2019-12-05 PROCEDURE — 25800030 ZZH RX IP 258 OP 636: Performed by: NURSE ANESTHETIST, CERTIFIED REGISTERED

## 2019-12-05 PROCEDURE — 43239 EGD BIOPSY SINGLE/MULTIPLE: CPT | Performed by: NURSE ANESTHETIST, CERTIFIED REGISTERED

## 2019-12-05 PROCEDURE — 36000050 ZZH SURGERY LEVEL 2 1ST 30 MIN: Performed by: SURGERY

## 2019-12-05 PROCEDURE — 45380 COLONOSCOPY AND BIOPSY: CPT | Performed by: SURGERY

## 2019-12-05 PROCEDURE — 36000052 ZZH SURGERY LEVEL 2 EA 15 ADDTL MIN: Performed by: SURGERY

## 2019-12-05 PROCEDURE — 88305 TISSUE EXAM BY PATHOLOGIST: CPT | Mod: TC | Performed by: SURGERY

## 2019-12-05 PROCEDURE — 25000125 ZZHC RX 250: Performed by: NURSE ANESTHETIST, CERTIFIED REGISTERED

## 2019-12-05 PROCEDURE — 25000128 H RX IP 250 OP 636: Performed by: NURSE ANESTHETIST, CERTIFIED REGISTERED

## 2019-12-05 PROCEDURE — 43239 EGD BIOPSY SINGLE/MULTIPLE: CPT | Performed by: SURGERY

## 2019-12-05 PROCEDURE — 25000125 ZZHC RX 250: Performed by: SURGERY

## 2019-12-05 PROCEDURE — 37000009 ZZH ANESTHESIA TECHNICAL FEE, EACH ADDTL 15 MIN: Performed by: SURGERY

## 2019-12-05 PROCEDURE — 99100 ANES PT EXTEME AGE<1 YR&>70: CPT | Performed by: NURSE ANESTHETIST, CERTIFIED REGISTERED

## 2019-12-05 PROCEDURE — 71000027 ZZH RECOVERY PHASE 2 EACH 15 MINS: Performed by: SURGERY

## 2019-12-05 PROCEDURE — 88342 IMHCHEM/IMCYTCHM 1ST ANTB: CPT | Mod: TC | Performed by: SURGERY

## 2019-12-05 PROCEDURE — 37000008 ZZH ANESTHESIA TECHNICAL FEE, 1ST 30 MIN: Performed by: SURGERY

## 2019-12-05 RX ORDER — SODIUM CHLORIDE, SODIUM LACTATE, POTASSIUM CHLORIDE, CALCIUM CHLORIDE 600; 310; 30; 20 MG/100ML; MG/100ML; MG/100ML; MG/100ML
INJECTION, SOLUTION INTRAVENOUS CONTINUOUS
Status: DISCONTINUED | OUTPATIENT
Start: 2019-12-05 | End: 2019-12-05 | Stop reason: HOSPADM

## 2019-12-05 RX ORDER — ONDANSETRON 2 MG/ML
4 INJECTION INTRAMUSCULAR; INTRAVENOUS EVERY 30 MIN PRN
Status: DISCONTINUED | OUTPATIENT
Start: 2019-12-05 | End: 2019-12-05 | Stop reason: HOSPADM

## 2019-12-05 RX ORDER — ONDANSETRON 4 MG/1
4 TABLET, ORALLY DISINTEGRATING ORAL EVERY 30 MIN PRN
Status: DISCONTINUED | OUTPATIENT
Start: 2019-12-05 | End: 2019-12-05 | Stop reason: HOSPADM

## 2019-12-05 RX ORDER — NALOXONE HYDROCHLORIDE 0.4 MG/ML
.1-.4 INJECTION, SOLUTION INTRAMUSCULAR; INTRAVENOUS; SUBCUTANEOUS
Status: DISCONTINUED | OUTPATIENT
Start: 2019-12-05 | End: 2019-12-05 | Stop reason: HOSPADM

## 2019-12-05 RX ORDER — LIDOCAINE HYDROCHLORIDE 20 MG/ML
INJECTION, SOLUTION INFILTRATION; PERINEURAL PRN
Status: DISCONTINUED | OUTPATIENT
Start: 2019-12-05 | End: 2019-12-05

## 2019-12-05 RX ORDER — PROPOFOL 10 MG/ML
INJECTION, EMULSION INTRAVENOUS PRN
Status: DISCONTINUED | OUTPATIENT
Start: 2019-12-05 | End: 2019-12-05

## 2019-12-05 RX ORDER — LIDOCAINE 40 MG/G
CREAM TOPICAL
Status: DISCONTINUED | OUTPATIENT
Start: 2019-12-05 | End: 2019-12-05 | Stop reason: HOSPADM

## 2019-12-05 RX ADMIN — PROPOFOL 10 MG: 10 INJECTION, EMULSION INTRAVENOUS at 13:15

## 2019-12-05 RX ADMIN — SODIUM CHLORIDE, POTASSIUM CHLORIDE, SODIUM LACTATE AND CALCIUM CHLORIDE: 600; 310; 30; 20 INJECTION, SOLUTION INTRAVENOUS at 11:56

## 2019-12-05 RX ADMIN — PROPOFOL 20 MG: 10 INJECTION, EMULSION INTRAVENOUS at 12:51

## 2019-12-05 RX ADMIN — PROPOFOL 20 MG: 10 INJECTION, EMULSION INTRAVENOUS at 12:57

## 2019-12-05 RX ADMIN — PROPOFOL 20 MG: 10 INJECTION, EMULSION INTRAVENOUS at 13:05

## 2019-12-05 RX ADMIN — PROPOFOL 20 MG: 10 INJECTION, EMULSION INTRAVENOUS at 12:55

## 2019-12-05 RX ADMIN — PROPOFOL 50 MG: 10 INJECTION, EMULSION INTRAVENOUS at 12:47

## 2019-12-05 RX ADMIN — PROPOFOL 20 MG: 10 INJECTION, EMULSION INTRAVENOUS at 13:12

## 2019-12-05 RX ADMIN — PROPOFOL 20 MG: 10 INJECTION, EMULSION INTRAVENOUS at 13:03

## 2019-12-05 RX ADMIN — LIDOCAINE HYDROCHLORIDE 40 MG: 20 INJECTION, SOLUTION INFILTRATION; PERINEURAL at 12:46

## 2019-12-05 RX ADMIN — PROPOFOL 20 MG: 10 INJECTION, EMULSION INTRAVENOUS at 13:09

## 2019-12-05 RX ADMIN — PROPOFOL 20 MG: 10 INJECTION, EMULSION INTRAVENOUS at 12:53

## 2019-12-05 RX ADMIN — PROPOFOL 20 MG: 10 INJECTION, EMULSION INTRAVENOUS at 13:07

## 2019-12-05 RX ADMIN — PROPOFOL 20 MG: 10 INJECTION, EMULSION INTRAVENOUS at 13:01

## 2019-12-05 RX ADMIN — PROPOFOL 20 MG: 10 INJECTION, EMULSION INTRAVENOUS at 12:59

## 2019-12-05 RX ADMIN — PROPOFOL 20 MG: 10 INJECTION, EMULSION INTRAVENOUS at 12:49

## 2019-12-05 ASSESSMENT — MIFFLIN-ST. JEOR: SCORE: 1515.67

## 2019-12-05 NOTE — ANESTHESIA CARE TRANSFER NOTE
Patient: Kenyon Rivera    Procedure(s):  upper endoscopy with biopsy, colonoscopy with biopsy    Diagnosis: Abdominal pain, generalized [R10.84]  Change in bowel habits [R19.4]  Diagnosis Additional Information: No value filed.    Anesthesia Type:   MAC     Note:  Airway :Nasal Cannula  Patient transferred to:Phase II  Handoff Report: Identifed the Patient, Identified the Reponsible Provider, Reviewed the pertinent medical history, Discussed the surgical course, Reviewed Intra-OP anesthesia mangement and issues during anesthesia, Set expectations for post-procedure period and Allowed opportunity for questions and acknowledgement of understanding      Vitals: (Last set prior to Anesthesia Care Transfer)    CRNA VITALS  12/5/2019 1249 - 12/5/2019 1325      12/5/2019             Pulse:  83    Ht Rate:  86    SpO2:  93 %    Resp Rate (set):  8                Electronically Signed By: JULIENNE Porter CRNA  December 5, 2019  1:25 PM

## 2019-12-05 NOTE — OR NURSING
Patient and responsible adult given discharge instructions with no questions regarding instructions. Melissa score 20. Pain level 0/10.  Discharged from unit via ambulated. Patient discharged to home.

## 2019-12-05 NOTE — BRIEF OP NOTE
[facility]    Brief Operative Note    Pre-operative diagnosis: Abdominal pain, generalized [R10.84]  Change in bowel habits [R19.4]  Post-operative diagnosis Gastritis, colitis, enlarged hemorrhoids    Procedure: Procedure(s):  upper endoscopy with biopsy, colonoscopy with biopsy  Surgeon: Surgeon(s) and Role:     * Zachariah Bailey MD - Primary  Anesthesia: Monitor Anesthesia Care   Estimated blood loss: Less than 10 ml  Drains: None  Specimens:   ID Type Source Tests Collected by Time Destination   A :  Biopsy Stomach, Antrum SURGICAL PATHOLOGY EXAM Zachariah Bailey MD 12/5/2019 12:52 PM    B : random colon biopsies Biopsy Colon SURGICAL PATHOLOGY EXAM Zachariah Bailey MD 12/5/2019  1:13 PM      Findings:   Thrombosed external hemorrhoid, enlarged internal hemorrhoids no masses, possible active colitis, gastritis, possible retained food in stomach.  Complications: None.  Implants: * No implants in log *

## 2019-12-05 NOTE — DISCHARGE INSTRUCTIONS
UPPER ENDOSCOPY    AFTER THE PROCEDURE    You will return to Same Day Surgery to rest for about an hour before you go home.    The doctor will talk with you and your family.    A family member/friend may visit with you.    You may burp up any air remaining in your stomach.    You may feel dizzy or light-headed from the medicine.    Your nurse will go over the discharge instructions with you and your caregiver and answer any of your questions.      You will be contacted the next day to check on how you are doing.    If biopsies were taken, you will be contacted with the results usually within 3 days.  BACK AT HOME    Rest for an hour or two after you get home.    When your throat is no longer numb and you have a gag reflex, take a few sips of cool water.  If you can swallow comfortably, you may start eating again.    You may have a mild sore throat for the rest of the day.    You will be contacted with results by the surgeons office within a week. If not contacted in one week, call the surgeons office.  WHAT TO WATCH FOR:  Problems rarely occur after the exam, but it is important to be aware of the early signs of a complication.  Call your doctor immediately if you have:    Difficulty swallowing or breathing    Unusual pain in your stomach or chest    Vomiting blood or dark material that looks like coffee grounds    Black or tarry stools    Temperature over 101.5 degrees    MORE QUESTIONS?  Please ask your doctor or nurse before the exam begins  or call your doctor at the clinic.    IF YOU MUST CANCEL YOUR PROCEDURE THE EVENING/NIGHT BEFORE, PLEASE CALL HOSPITAL ADMITTING -[phone] OR TOLL FREE [phone], EXT. [phone].    Phone Numbers:  [facility] - [phone] [phone]  [facility] - [phone]  Surgery Patient Education - [phone] or toll free [phone]    INSTRUCTIONS AFTER COLONOSCOPY    WHEN YOU ARE BACK HOME:    Plan to rest for an hour or two after you get home.    You may have  some cramping or pressure until you pass gas.    You may resume your regular medications.    Eat a small, light meal at first, and then gradually return to normal meal sizes.    You will be contacted the next day to see how you are doing.  If you had a polyp removed:    Slight bleeding may occur.  You may have a slight blood stain on the toilet paper after a bowel movement.    To lessen the chance of bleeding, avoid heavy exercise for ONE WEEK.  This includes heavy lifting, vigorous sport activities, and heavy physical labor.  You may resume your normal sexual activity.      Avoid aspirin or aspirin products if instructed by your doctor.    If there is a polyp or biopsy, you will be contacted with results within one week.     WHAT TO WATCH FOR:  Problems rarely occur after the exam; however, it is important for you to watch for early signs of possible problems.  If you have     Unusual pain in your abdomen    Nausea and vomiting that persists    Excessive bleeding    Black or bloody bowel movements    Fever or temperature above 100.6 F  Please call your doctor ([facility] [phone]) or go to the nearest hospital emergency room.    Post-Anesthesia Patient Instructions    IMMEDIATELY FOLLOWING SURGERY:  Do not drive or operate machinery for the first twenty four hours after surgery.  Do not make any important decisions for twenty four hours after surgery or while taking narcotic pain medications or sedatives.  If you develop intractable nausea and vomiting or a severe headache please notify your doctor immediately.    FOLLOW-UP:  Please make an appointment with your surgeon as instructed. You do not need to follow up with anesthesia unless specifically instructed to do so.    WOUND CARE INSTRUCTIONS (if applicable):  Keep a dry clean dressing on the anesthesia/puncture wound site if there is drainage.  Once the wound has quit draining you may leave it open to air.  Generally you should leave the bandage intact  for twenty four hours unless there is drainage.  If the epidural site drains for more than 36-48 hours please call the anesthesia department.    QUESTIONS?:  Please feel free to call your physician or the hospital  if you have any questions, and they will be happy to assist you.

## 2019-12-05 NOTE — ANESTHESIA POSTPROCEDURE EVALUATION
Patient: Kenyon Rivera    Procedure(s):  upper endoscopy with biopsy, colonoscopy with biopsy    Diagnosis:Abdominal pain, generalized [R10.84]  Change in bowel habits [R19.4]  Diagnosis Additional Information: No value filed.    Anesthesia Type:  MAC    Note:  Anesthesia Post Evaluation    Patient location during evaluation: Bedside  Patient participation: Able to fully participate in evaluation  Level of consciousness: awake and alert  Pain management: adequate  Airway patency: patent  Cardiovascular status: acceptable  Respiratory status: acceptable  Hydration status: acceptable  PONV: none     Anesthetic complications: None          Last vitals:  Vitals:    12/05/19 1400 12/05/19 1405 12/05/19 1410   BP: 127/75 135/74    Pulse: 86 86    Resp:  18    Temp:      SpO2: 94% 93% 92%         Electronically Signed By: JULIENNE Carter CRNA  December 5, 2019  2:32 PM

## 2019-12-07 NOTE — OP NOTE
Kenyon Rivera MRN# [id number]   YOB: 1934 Age: 85 year old      Date of Admission:  12/5/2019  Date of Service:   12/05/19    Primary care provider: Eric Bernal    PREOPERATIVE DIAGNOSIS:  Change in stool habits, abdominal pain        POSTOPERATIVE DIAGNOSIS:     Gastritis, colitis, enlarged hemorrhoids       PROCEDURE:  Colonoscopy with biopsy, Upper endoscopy with biopsy           INDICATIONS:  Diagnostic colonoscopy, upper endoscopy with biopsy     Specimen:   ID Type Source Tests Collected by Time Destination   A :  Biopsy Stomach, Antrum SURGICAL PATHOLOGY EXAM Zachariah Bailey MD 12/5/2019 12:52 PM    B : random colon biopsies Biopsy Colon SURGICAL PATHOLOGY EXAM Zachariah Bailey MD 12/5/2019  1:13 PM        SURGEON: Zachariah Bailey MD    PROCEDURE:  With the patient in the supine position on the transport cart, IV sedation was administered by the nurse anesthetist.  His correct identity and planned procedure were confirmed during a requisite timeout pause.  A bite block was placed and the fiberoptic endoscope was introduced and negotiated through the cricopharyngeus without difficulty.  The length of the esophagus was examined without findings.  The gastroesophageal junction was noted 36 cm from the incisors; the Z line was regular without ulceration, bleeding source or stricture.  There was no evidence of Aviles's change.  The stomach was entered and the pylorus was traversed easily.  The gastric mucosa was beet red with significant edema, no bleeding source though. There was some retained fibrous material in the stomach.  The duodenum was similarly without finding.  The endoscope was returned to the body of the stomach and retroflex was normal      Random cold forceps biopsies were obtained of the antrum for H. pylori.  Hemostasis was judged adequate on visualization.   The endoscope was returned to the GE junction.  A second circumferential examination of the esophagus was  performed on slow withdrawal of the endoscope without additional finding.       Our attention then turned to the colonoscopy the external anus was inspected and there was noted to be a small thrombosed external hemorrhoid on the left lateral, there was enlarged internal hemorrhoid. Digital rectal exam was normal. The colonoscope was inserted and advanced under direct visualization to the level of the cecum which was identified by the appendiceal orifice and the ileocecal valve. The prep was fair . Upon slow withdrawal of the colonoscope, approximately 95% of the mucosa was directly visualized. There were segments most significant in the sigmoid colon that appeared to be inflamed, these areas were biopsied. There were a couple < 0.5 cm polyps that were left in place due to patients age.  The rest of the colon was without mucosal abnormality. There was no evidence of further polyps, inflammation, bleeding or AVMs. Retroflexion of the rectum was normal, no masses noted.  The extra air was removed from the colon, and the colonoscope withdrawn. The patient tolerated the procedure well and was taken to postanesthesia care unit.          We invite the patient to return per PCP for repeat screening     Zachariah Bailey MD

## 2019-12-12 LAB — COPATH REPORT: NORMAL

## 2019-12-16 ENCOUNTER — TELEPHONE (OUTPATIENT)
Dept: SURGERY | Facility: OTHER | Age: 84
End: 2019-12-16

## 2019-12-16 DIAGNOSIS — K52.9 NON-SPECIFIC COLITIS: Primary | ICD-10-CM

## 2019-12-16 RX ORDER — CIPROFLOXACIN 250 MG/1
250 TABLET, FILM COATED ORAL 2 TIMES DAILY
Qty: 20 TABLET | Refills: 0 | Status: SHIPPED | OUTPATIENT
Start: 2019-12-16 | End: 2019-12-31

## 2019-12-16 RX ORDER — METRONIDAZOLE 500 MG/1
500 TABLET ORAL 3 TIMES DAILY
Qty: 30 TABLET | Refills: 0 | Status: SHIPPED | OUTPATIENT
Start: 2019-12-16 | End: 2019-12-31

## 2019-12-17 ENCOUNTER — OFFICE VISIT (OUTPATIENT)
Dept: CHIROPRACTIC MEDICINE | Facility: OTHER | Age: 84
End: 2019-12-17
Attending: CHIROPRACTOR
Payer: OTHER MISCELLANEOUS

## 2019-12-17 DIAGNOSIS — M99.02 SEGMENTAL AND SOMATIC DYSFUNCTION OF THORACIC REGION: ICD-10-CM

## 2019-12-17 DIAGNOSIS — M99.03 SEGMENTAL AND SOMATIC DYSFUNCTION OF LUMBAR REGION: Primary | ICD-10-CM

## 2019-12-17 DIAGNOSIS — M54.50 ACUTE BILATERAL LOW BACK PAIN WITHOUT SCIATICA: ICD-10-CM

## 2019-12-17 PROCEDURE — 98941 CHIROPRACT MANJ 3-4 REGIONS: CPT | Mod: AT | Performed by: CHIROPRACTOR

## 2019-12-18 NOTE — PROGRESS NOTES
Subjective Finding:    Chief compalint: Patient presents with:  Back Pain  , Pain Scale: 5/10, Intensity: dull and ache, Duration: 3 weeks, Change since last visit: , Radiating: bilateral buttock.    Date of injury:     Activities that the pain restricts:   Home/household/hobbies/social activities: yes.  Work duties: yes.  Sleep: no.  Makes symptoms better: rest.  Makes symptoms worse: activity, lumbar extension and lumbar flexion.  Have you seen anyone else for the symptoms? no.  Work related: no  Automobile related injury: no.    Objective and Assessment:    Posture Analysis:   High shoulder: right.  Head tilt: right.  High iliac crest: right.  Head carriage: neutral.  Thoracic Kyphosis: neutral.  Lumbar Lordosis: forward.    Lumbar Range of Motion: flexion decreased, extension decreased, left lateral flexion decreased and right lateral flexion decreased.  Cervical Range of Motion: .  Thoracic Range of Motion: .  Extremity Range of Motion: .    Palpation:   Quad lumb: bilateral, referred pain: no     Segmental dysfunction pre-treatment: T10  L4-5  SI.     Assessment post-treatment:  Cervical: ROM increased.  Thoracic: ROM increased.  Lumbar: ROM increased, pain and tenderness decreased and muscle spasm decreased.    Comments: .      Complicating Factors: .    Plan / Procedure:    Expected release date: .  Treatment plan: 1 times per month.  Instructed patient: ice 20 minutes every other hour as needed and rest.  Short term goals: reduce pain.  Long term goals: restore normal function.  Prognosis: excellent.

## 2019-12-30 DIAGNOSIS — M54.41 BILATERAL LOW BACK PAIN WITH RIGHT-SIDED SCIATICA, UNSPECIFIED CHRONICITY: ICD-10-CM

## 2019-12-30 RX ORDER — TRAMADOL HYDROCHLORIDE 50 MG/1
TABLET ORAL
Qty: 90 TABLET | Refills: 3 | Status: SHIPPED | OUTPATIENT
Start: 2019-12-30 | End: 2020-05-19

## 2019-12-30 NOTE — TELEPHONE ENCOUNTER
Tramadol      Last Written Prescription Date:  7/9/19  Last Fill Quantity: 90,   # refills: 3  Last Office Visit: 11/14/19  Future Office visit:    Next 5 appointments (look out 90 days)    Dec 31, 2019 11:30 AM CST  (Arrive by 11:15 AM)  Return Visit with Zachariah Bailey MD  [facility] ([facility] ) [address] [place] [address]  [phone]           Routing refill request to provider for review/approval because:

## 2019-12-31 ENCOUNTER — OFFICE VISIT (OUTPATIENT)
Dept: SURGERY | Facility: OTHER | Age: 84
End: 2019-12-31
Attending: SURGERY
Payer: MEDICARE

## 2019-12-31 VITALS
HEIGHT: 67 IN | HEART RATE: 79 BPM | DIASTOLIC BLOOD PRESSURE: 68 MMHG | SYSTOLIC BLOOD PRESSURE: 124 MMHG | TEMPERATURE: 97.8 F | RESPIRATION RATE: 24 BRPM | BODY MASS INDEX: 29.98 KG/M2 | OXYGEN SATURATION: 94 % | WEIGHT: 191 LBS

## 2019-12-31 DIAGNOSIS — K52.9 NON-SPECIFIC COLITIS: Primary | ICD-10-CM

## 2019-12-31 PROCEDURE — 99212 OFFICE O/P EST SF 10 MIN: CPT | Performed by: SURGERY

## 2019-12-31 PROCEDURE — G0463 HOSPITAL OUTPT CLINIC VISIT: HCPCS

## 2019-12-31 ASSESSMENT — MIFFLIN-ST. JEOR: SCORE: 1510

## 2019-12-31 ASSESSMENT — PAIN SCALES - GENERAL: PAINLEVEL: MILD PAIN (2)

## 2019-12-31 NOTE — PATIENT INSTRUCTIONS
Thank you for allowing Dr. Bailey and our surgical team to participate in your care. Please call our health unit coordinator at [phone] with scheduling questions or the nurse at [phone] with any other questions or concerns.

## 2020-01-02 ENCOUNTER — OFFICE VISIT (OUTPATIENT)
Dept: CHIROPRACTIC MEDICINE | Facility: OTHER | Age: 85
End: 2020-01-02
Attending: CHIROPRACTOR
Payer: OTHER MISCELLANEOUS

## 2020-01-02 DIAGNOSIS — M99.03 SEGMENTAL AND SOMATIC DYSFUNCTION OF LUMBAR REGION: Primary | ICD-10-CM

## 2020-01-02 DIAGNOSIS — M54.50 ACUTE BILATERAL LOW BACK PAIN WITHOUT SCIATICA: ICD-10-CM

## 2020-01-02 DIAGNOSIS — M99.02 SEGMENTAL AND SOMATIC DYSFUNCTION OF THORACIC REGION: ICD-10-CM

## 2020-01-02 PROCEDURE — 99212 OFFICE O/P EST SF 10 MIN: CPT | Mod: 25 | Performed by: CHIROPRACTOR

## 2020-01-02 PROCEDURE — 98940 CHIROPRACT MANJ 1-2 REGIONS: CPT | Mod: AT | Performed by: CHIROPRACTOR

## 2020-01-03 NOTE — PROGRESS NOTES
"[facility] Progress Note    HPI: 85 y.o. male present for follow up from a colonoscopy were noted to have non-specific colitis, he was placed on a course of oral antibiotics.       S: He is doing much better, he has been having regular bowel movements, no blood no concerns.     O:   Vitals:  /68 (BP Location: Right arm, Patient Position: Sitting, Cuff Size: Adult Large)   Pulse 79   Temp 97.8  F (36.6  C) (Tympanic)   Resp 24   Ht 1.702 m (5' 7\")   Wt 86.6 kg (191 lb)   SpO2 94%   BMI 29.91 kg/m        Physical Exam:  G: alert oriented, no acute distress   No Exam performed.      Assessment/Plan:    85 y.o. male with non-specific colitis, seems to have improved with oral antibiotics, will hold off on any oral ASA at this time. As he has no complaints at this time will hold off on any further work-up. He can follow up PRN at this time.     Zachariah Bailey MD     "

## 2020-02-12 ENCOUNTER — OFFICE VISIT (OUTPATIENT)
Dept: CHIROPRACTIC MEDICINE | Facility: OTHER | Age: 85
End: 2020-02-12
Attending: CHIROPRACTOR
Payer: OTHER MISCELLANEOUS

## 2020-02-12 DIAGNOSIS — M99.03 SEGMENTAL AND SOMATIC DYSFUNCTION OF LUMBAR REGION: Primary | ICD-10-CM

## 2020-02-12 DIAGNOSIS — M54.50 ACUTE BILATERAL LOW BACK PAIN WITHOUT SCIATICA: ICD-10-CM

## 2020-02-12 DIAGNOSIS — M99.02 SEGMENTAL AND SOMATIC DYSFUNCTION OF THORACIC REGION: ICD-10-CM

## 2020-02-12 PROCEDURE — 98940 CHIROPRACT MANJ 1-2 REGIONS: CPT | Mod: AT | Performed by: CHIROPRACTOR

## 2020-03-03 ENCOUNTER — OFFICE VISIT (OUTPATIENT)
Dept: CHIROPRACTIC MEDICINE | Facility: OTHER | Age: 85
End: 2020-03-03
Attending: CHIROPRACTOR
Payer: OTHER MISCELLANEOUS

## 2020-03-03 DIAGNOSIS — M54.50 ACUTE BILATERAL LOW BACK PAIN WITHOUT SCIATICA: ICD-10-CM

## 2020-03-03 DIAGNOSIS — M99.02 SEGMENTAL AND SOMATIC DYSFUNCTION OF THORACIC REGION: ICD-10-CM

## 2020-03-03 DIAGNOSIS — M99.03 SEGMENTAL AND SOMATIC DYSFUNCTION OF LUMBAR REGION: Primary | ICD-10-CM

## 2020-03-03 PROCEDURE — 98940 CHIROPRACT MANJ 1-2 REGIONS: CPT | Mod: AT | Performed by: CHIROPRACTOR

## 2020-03-17 ENCOUNTER — TRANSFERRED RECORDS (OUTPATIENT)
Dept: HEALTH INFORMATION MANAGEMENT | Facility: CLINIC | Age: 85
End: 2020-03-17

## 2020-03-20 NOTE — PROGRESS NOTES
"Kenyon Rivera is a 85 year old male who is being evaluated via a billable telephone visit.      The patient has been notified of following:     \"This telephone visit will be conducted via a call between you and your physician/provider. We have found that certain health care needs can be provided without the need for a physical exam.  This service lets us provide the care you need with a short phone conversation.  If a prescription is necessary we can send it directly to your pharmacy.  If lab work is needed we can place an order for that and you can then stop by our lab to have the test done at a later time.    If during the course of the call the physician/provider feels a telephone visit is not appropriate, you will not be charged for this service.\"     Kenyon Rivera complains of    Chief Complaint   Patient presents with     Work Comp       I have reviewed and updated the patient's Past Medical History, Social History, Family History and Medication List.    ALLERGIES  Chlorzoxazone      Additional provider notes: reviewed.  He had an increase in pain following a fall.  Was seen and had xray which showed arthritis, no fx.  Doing better.  Needs no meds and we reviewed precautions for virus, etc.     Assessment/Plan:  1. Bilateral low back pain with right-sided sciatica, unspecified chronicity  Stable.  No change.  Refill meds when due.  F/u in clinic when safe.  Doing fine over the phone right now.  He is social distancing.        Phone call duration:  5 minutes    Eric Bernal MD  "

## 2020-03-20 NOTE — PROGRESS NOTES
"Kenyon Rivera is a 85 year old male who is being evaluated via a billable telephone visit.      The patient has been notified of following:     \"This telephone visit will be conducted via a call between you and your physician/provider. We have found that certain health care needs can be provided without the need for a physical exam.  This service lets us provide the care you need with a short phone conversation.  If a prescription is necessary we can send it directly to your pharmacy.  If lab work is needed we can place an order for that and you can then stop by our lab to have the test done at a later time.    If during the course of the call the physician/provider feels a telephone visit is not appropriate, you will not be charged for this service.\"     Kenyon Rivera complains of    Chief Complaint   Patient presents with     Recheck Medication     pt is concerned about weight loss       I have reviewed and updated the patient's Past Medical History, Social History, Family History and Medication List.    ALLERGIES  Chlorzoxazone      Additional provider notes: doing fine.  Lost about 15 pounds over a few months but now holding at 190 for a few weeks.  We reviewed.  He had upper and lower scope.  Bowels doing better now.      Assessment/Plan:  1. Weight loss  Reviewed.  Reassured mostly.  He will follow his weight and we will work up if indicated when safe.  He is social distancing.        Phone call duration:  5 minutes    Eric Bernal MD  "

## 2020-03-24 ENCOUNTER — VIRTUAL VISIT (OUTPATIENT)
Dept: FAMILY MEDICINE | Facility: OTHER | Age: 85
End: 2020-03-24
Attending: FAMILY MEDICINE
Payer: COMMERCIAL

## 2020-03-24 ENCOUNTER — VIRTUAL VISIT (OUTPATIENT)
Dept: FAMILY MEDICINE | Facility: OTHER | Age: 85
End: 2020-03-24
Attending: FAMILY MEDICINE
Payer: MEDICARE

## 2020-03-24 VITALS
DIASTOLIC BLOOD PRESSURE: 66 MMHG | TEMPERATURE: 97.7 F | SYSTOLIC BLOOD PRESSURE: 120 MMHG | BODY MASS INDEX: 29.76 KG/M2 | HEART RATE: 89 BPM | WEIGHT: 190 LBS

## 2020-03-24 DIAGNOSIS — R63.4 WEIGHT LOSS: Primary | ICD-10-CM

## 2020-03-24 DIAGNOSIS — M54.41 BILATERAL LOW BACK PAIN WITH RIGHT-SIDED SCIATICA, UNSPECIFIED CHRONICITY: ICD-10-CM

## 2020-03-24 PROCEDURE — 99212 OFFICE O/P EST SF 10 MIN: CPT | Mod: 95 | Performed by: FAMILY MEDICINE

## 2020-03-24 PROCEDURE — 99207 ZZC NO CHARGE LOS: CPT | Performed by: FAMILY MEDICINE

## 2020-03-24 ASSESSMENT — PAIN SCALES - GENERAL: PAINLEVEL: MILD PAIN (3)

## 2020-05-18 DIAGNOSIS — M54.41 BILATERAL LOW BACK PAIN WITH RIGHT-SIDED SCIATICA, UNSPECIFIED CHRONICITY: ICD-10-CM

## 2020-05-19 RX ORDER — TRAMADOL HYDROCHLORIDE 50 MG/1
TABLET ORAL
Qty: 90 TABLET | Refills: 0 | Status: SHIPPED | OUTPATIENT
Start: 2020-05-19 | End: 2020-07-17

## 2020-05-19 NOTE — TELEPHONE ENCOUNTER
traMADol (ULTRAM) 50 MG tablet      Last Written Prescription Date:  12/30/19  Last Fill Quantity: 90,   # refills: 3  Last Office Visit: 3/24/20 virtual visit  Future Office visit:       Routing refill request to provider for review/approval because:  Drug not on the [facility], [facility] or [facility] refill protocol or controlled substance

## 2020-07-17 DIAGNOSIS — M54.41 BILATERAL LOW BACK PAIN WITH RIGHT-SIDED SCIATICA, UNSPECIFIED CHRONICITY: ICD-10-CM

## 2020-07-17 RX ORDER — CYCLOBENZAPRINE HCL 10 MG
TABLET ORAL
Qty: 180 TABLET | Refills: 3 | Status: SHIPPED | OUTPATIENT
Start: 2020-07-17 | End: 2021-05-18

## 2020-07-17 RX ORDER — TRAMADOL HYDROCHLORIDE 50 MG/1
TABLET ORAL
Qty: 90 TABLET | Refills: 0 | Status: SHIPPED | OUTPATIENT
Start: 2020-07-17 | End: 2020-09-10

## 2020-07-17 NOTE — TELEPHONE ENCOUNTER
TRAMADOL 50MG TABLETS       Last Written Prescription Date:  5/19/2020  Last Fill Quantity: 90,   # refills: 0  Last Office Visit: 3/24/20 virtual  Future Office visit:       Routing refill request to provider for review/approval because:  Drug not on the [facility], [facility] or [facility] refill protocol or controlled substance    CYCLOBENZAPRINE 10MG TABLETS       Last Written Prescription Date:  7/9/2019  Last Fill Quantity: 180,   # refills: 3  Last Office Visit: 3/24/2020 virtual  Future Office visit:       Routing refill request to provider for review/approval because:  Drug not on the [facility], [facility] or [facility] refill protocol or controlled substance

## 2020-07-27 NOTE — PROGRESS NOTES
Outpatient Physical Therapy Progress Note     Patient: Kenyon Rivera  : 1934    Beginning/End Dates of Reporting Period:  2019 to 2019    Referring Provider: Dr. Bernal     Therapy Diagnosis: Medical Diagnosis: Chronic midline low back pain without sciatica [M54.5, G89.29]      Client Self Report: Symptoms are improved.     Assessment: Good response to PT services.     Goals:  STG 1: Patient will be independent and compliant with HEP, neutral posture and back health concepts.      LTG 1: 10 weeks   Patient will be able to perform daily tasks in home and community without limitation from LBP or LE radicular symptoms with good activity tolerance.   LTG2: 10 weeks  Patient will report significantly improved pain at night with sleep.   LTG 3: 10 weeks  Patient will be able to return to fitness program and recreational hobbies without limitation from LBP or LE radicular symptoms and good activity tolerance.     Plan:  Discharge from therapy.    Discharge:    Reason for Discharge: Patient has met all goals.    Equipment Issued: HEP    Discharge Plan: Patient to continue home program.

## 2020-08-01 ENCOUNTER — TRANSFERRED RECORDS (OUTPATIENT)
Dept: HEALTH INFORMATION MANAGEMENT | Facility: CLINIC | Age: 85
End: 2020-08-01

## 2020-08-01 LAB — RETINOPATHY: NORMAL

## 2020-08-04 ENCOUNTER — TRANSFERRED RECORDS (OUTPATIENT)
Dept: HEALTH INFORMATION MANAGEMENT | Facility: CLINIC | Age: 85
End: 2020-08-04

## 2020-08-18 ENCOUNTER — TELEPHONE (OUTPATIENT)
Dept: FAMILY MEDICINE | Facility: OTHER | Age: 85
End: 2020-08-18

## 2020-08-18 DIAGNOSIS — E11.69 TYPE 2 DIABETES MELLITUS WITH OTHER SPECIFIED COMPLICATION, UNSPECIFIED WHETHER LONG TERM INSULIN USE (H): Primary | ICD-10-CM

## 2020-08-18 NOTE — TELEPHONE ENCOUNTER
Pt scheduled Physical for November 16, but wants lab orders submitted by Dr. Bernal to do labs in [facility].   Please have nurse call and inform orders have been submitted; need to direct pt to make a lab appt

## 2020-09-08 ENCOUNTER — OFFICE VISIT (OUTPATIENT)
Dept: CHIROPRACTIC MEDICINE | Facility: OTHER | Age: 85
End: 2020-09-08
Attending: CHIROPRACTOR
Payer: OTHER MISCELLANEOUS

## 2020-09-08 DIAGNOSIS — M99.03 SEGMENTAL AND SOMATIC DYSFUNCTION OF LUMBAR REGION: Primary | ICD-10-CM

## 2020-09-08 DIAGNOSIS — M54.50 ACUTE BILATERAL LOW BACK PAIN WITHOUT SCIATICA: ICD-10-CM

## 2020-09-08 DIAGNOSIS — M99.02 SEGMENTAL AND SOMATIC DYSFUNCTION OF THORACIC REGION: ICD-10-CM

## 2020-09-08 PROCEDURE — 98940 CHIROPRACT MANJ 1-2 REGIONS: CPT | Mod: AT | Performed by: CHIROPRACTOR

## 2020-09-10 DIAGNOSIS — M54.41 BILATERAL LOW BACK PAIN WITH RIGHT-SIDED SCIATICA, UNSPECIFIED CHRONICITY: ICD-10-CM

## 2020-09-10 RX ORDER — TRAMADOL HYDROCHLORIDE 50 MG/1
TABLET ORAL
Qty: 90 TABLET | Refills: 0 | Status: SHIPPED | OUTPATIENT
Start: 2020-09-10 | End: 2020-10-20

## 2020-09-10 NOTE — TELEPHONE ENCOUNTER
traMADOL (ULTRAM) 50 MG tablet      Last Written Prescription Date:  07/17/20  Last Fill Quantity: 90,   # refills: 0  Last Office Visit: 03/24/20  Future Office visit:    Next 5 appointments (look out 90 days)    Nov 16, 2020 10:45 AM CST  (Arrive by 10:30 AM)  [facility]YS[facility]al [facility] [address]  [facility] [address]  [phone]

## 2020-10-20 DIAGNOSIS — M54.41 BILATERAL LOW BACK PAIN WITH RIGHT-SIDED SCIATICA, UNSPECIFIED CHRONICITY: ICD-10-CM

## 2020-10-20 RX ORDER — TRAMADOL HYDROCHLORIDE 50 MG/1
TABLET ORAL
Qty: 90 TABLET | Refills: 0 | Status: SHIPPED | OUTPATIENT
Start: 2020-10-20 | End: 2020-12-17

## 2020-10-20 NOTE — TELEPHONE ENCOUNTER
Mily New is a 30 year old female presenting with follow up for MRI scans  Denies known Latex allergy or symptoms of Latex sensitivity  Medications verified, no changes    Social History     Tobacco Use   Smoking Status Former Smoker   • Packs/day: 1.00   • Years: 6.00   • Pack years: 6.00   • Types: Cigarettes   • Quit date: 2019   • Years since quittin.4   Smokeless Tobacco Never Used        norco      Last Written Prescription Date: 10/3/16  Last Fill Quantity: 60,  # refills: 0   Last Office Visit with [facility], [facility] or [facility] prescribing provider: 10/31/16                                         Next 5 appointments (look out 90 days)     Feb 27, 2017  1:45 PM   (Arrive by 1:30 PM)   PHYSICAL with Eric Bernal MD   [facility] ([facility])    [address] [place] [id number]   [phone]

## 2020-11-04 ENCOUNTER — OFFICE VISIT (OUTPATIENT)
Dept: CHIROPRACTIC MEDICINE | Facility: OTHER | Age: 85
End: 2020-11-04
Attending: CHIROPRACTOR
Payer: OTHER MISCELLANEOUS

## 2020-11-04 DIAGNOSIS — M99.03 SEGMENTAL AND SOMATIC DYSFUNCTION OF LUMBAR REGION: Primary | ICD-10-CM

## 2020-11-04 DIAGNOSIS — M99.02 SEGMENTAL AND SOMATIC DYSFUNCTION OF THORACIC REGION: ICD-10-CM

## 2020-11-04 DIAGNOSIS — M99.01 SEGMENTAL AND SOMATIC DYSFUNCTION OF CERVICAL REGION: ICD-10-CM

## 2020-11-04 DIAGNOSIS — M54.50 ACUTE BILATERAL LOW BACK PAIN WITHOUT SCIATICA: ICD-10-CM

## 2020-11-04 PROCEDURE — 98941 CHIROPRACT MANJ 3-4 REGIONS: CPT | Mod: AT | Performed by: CHIROPRACTOR

## 2020-11-05 NOTE — PROGRESS NOTES
Subjective Finding:    Chief compalint: Patient presents with:  Back Pain  Neck Pain  , Pain Scale: 5/10, Intensity: dull and ache, Duration: 3 weeks, Change since last visit: , Radiating: bilateral buttock.    Date of injury:     Activities that the pain restricts:   Home/household/hobbies/social activities: yes.  Work duties: yes.  Sleep: no.  Makes symptoms better: rest.  Makes symptoms worse: activity, lumbar extension and lumbar flexion.  Have you seen anyone else for the symptoms? no.  Work related: no  Automobile related injury: no.    Objective and Assessment:    Posture Analysis:   High shoulder: right.  Head tilt: right.  High iliac crest: right.  Head carriage: neutral.  Thoracic Kyphosis: neutral.  Lumbar Lordosis: forward.    Lumbar Range of Motion: flexion decreased, extension decreased, left lateral flexion decreased and right lateral flexion decreased.  Cervical Range of Motion: .  Thoracic Range of Motion: .  Extremity Range of Motion: .    Palpation:   Quad lumb: bilateral, referred pain: no     Segmental dysfunction pre-treatment: T10  L4-5  SI.  C7    Assessment post-treatment:  Cervical: ROM increased.  Thoracic: ROM increased.  Lumbar: ROM increased, pain and tenderness decreased and muscle spasm decreased.    Comments: .      Complicating Factors: .    Plan / Procedure:    Expected release date: .  Treatment plan: 1 times per month.  Instructed patient: ice 20 minutes every other hour as needed and rest.  Short term goals: reduce pain.  Long term goals: restore normal function.  Prognosis: excellent.

## 2020-11-10 NOTE — PATIENT INSTRUCTIONS
Patient Education   Personalized Prevention Plan  You are due for the preventive services outlined below.  Your care team is available to assist you in scheduling these services.  If you have already completed any of these items, please share that information with your care team to update in your medical record.  Health Maintenance Due   Topic Date Due     Kidney Microalbumin Urine Test  05/31/1934     Diabetic Foot Exam  05/31/1934     Eye Exam  05/31/1934     TREATMENT AGREEMENT FOR CHRONIC PAIN MANAGEMENT  05/31/1934     Zoster (Shingles) Vaccine (1 of 2) 05/31/1984     A1C Lab  05/23/2017     Diptheria Tetanus Pertussis (DTAP/TDAP/TD) Vaccine (2 - Td) 10/13/2018     Anxiety Assessment  11/07/2019     Depression Assessment  11/07/2019     PHQ-2  01/01/2020     Liver Monitoring Lab  11/01/2020     Basic Metabolic Panel  11/01/2020     Cholesterol Lab  11/01/2020     URINE DRUG SCREEN  11/14/2020

## 2020-11-10 NOTE — PROGRESS NOTES
"  SUBJECTIVE:   Kenyon Rivera is a 86 year old male who presents for Preventive Visit.      Patient has been advised of split billing requirements and indicates understanding: Yes  Are you in the first 12 months of your Medicare Part B coverage?  No    Physical Health:    In general, how would you rate your overall physical health? good    Outside of work, how many days during the week do you exercise? 2-3 days/week    Outside of work, approximately how many minutes a day do you exercise?greater than 60 minutes    If you drink alcohol do you typically have >3 drinks per day or >7 drinks per week? No    Do you usually eat at least 4 servings of fruit and vegetables a day, include whole grains & fiber and avoid regularly eating high fat or \"junk\" foods? Yes    Do you have any problems taking medications regularly?  No    Do you have any side effects from medications? none    Needs assistance for the following daily activities: no assistance needed    Which of the following safety concerns are present in your home?  none identified     Hearing impairment: Yes, Need to ask people to speak up or repeat themselves.    In the past 6 months, have you been bothered by leaking of urine? no    Mental Health:    In general, how would you rate your overall mental or emotional health? good  PHQ-2 Score:      Do you feel safe in your environment? Yes    Have you ever done Advance Care Planning? (For example, a Health Directive, POLST, or a discussion with a medical provider or your loved ones about your wishes): Yes, advance care planning is on file.    Additional concerns to address?  No    Fall risk:  Fallen 2 or more times in the past year?: No  Any fall with injury in the past year?: No    Cognitive Screenin) Repeat 3 items (Leader, Season, Table)    2) Clock draw: NORMAL  3) 3 item recall: Recalls 1 object   Results: NORMAL clock, 1-2 items recalled: COGNITIVE IMPAIRMENT LESS LIKELY    Mini-CogTM Copyright MONICA Zheng. " Licensed by the author for use in [facility]; reprinted with permission ([email or web address]). All rights reserved.      Do you have sleep apnea, excessive snoring or daytime drowsiness?: no    Diabetes Follow-up      How often are you checking your blood sugar? Not at all    What concerns do you have today about your diabetes? None     Do you have any of these symptoms? (Select all that apply)  Numbness in feet    Have you had a diabetic eye exam in the last 12 months? Yes- Date of last eye exam: August,  Location: [place]        BP Readings from Last 2 Encounters:   20 136/78   20 120/66     Hemoglobin A1C (%)   Date Value   2020 6.4 (H)   2017 6.7 (H)     LDL Cholesterol Calculated (mg/dL)   Date Value   2020 87   2019 112 (H)                      Diabetic foot exam   1. foot deformity     2. Current or previous foot ulceration       3. Current or previous pre-ulcerative calluses       4. 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 poor circulation           Reviewed and updated as needed this visit by clinical staff  Tobacco  Allergies  Meds              Reviewed and updated as needed this visit by Provider                Social History     Tobacco Use     Smoking status: Former Smoker     Packs/day: 2.00     Years: 26.00     Pack years: 52.00     Types: Cigarettes     Start date: 1949     Quit date: 1975     Years since quittin.8     Smokeless tobacco: Never Used     Tobacco comment: Tried to Quit (YES)   Substance Use Topics     Alcohol use: Yes     Comment: RARELY                           Current providers sharing in care for this patient include:   Patient Care Team:  Eric Bernal MD as PCP - General  Eric Bernal MD as Assigned PCP  Zachariah Bailey MD as Assigned Surgical Provider    The following health maintenance items are  "reviewed in Epic and correct as of today:  Health Maintenance   Topic Date Due     DIABETIC FOOT EXAM  05/31/1934     EYE EXAM  05/31/1934     TREATMENT AGREEMENT FOR CHRONIC PAIN MANAGEMENT  05/31/1934     ZOSTER IMMUNIZATION (1 of 2) 05/31/1984     DTAP/TDAP/TD IMMUNIZATION (2 - Td) 10/13/2018     TIMMY ASSESSMENT  11/07/2019     PHQ-9  11/07/2019     URINE DRUG SCREEN  11/14/2020     FALL RISK ASSESSMENT  12/31/2020     A1C  02/12/2021     ALT  11/12/2021     BMP  11/12/2021     LIPID  11/12/2021     MICROALBUMIN  11/12/2021     MEDICARE ANNUAL WELLNESS VISIT  11/16/2021     COLORECTAL CANCER SCREENING  12/05/2024     ADVANCE CARE PLANNING  11/16/2025     PHQ-2  Completed     INFLUENZA VACCINE  Completed     Pneumococcal Vaccine: 65+ Years  Completed     Pneumococcal Vaccine: Pediatrics (0 to 5 Years) and At-Risk Patients (6 to 64 Years)  Aged Out     IPV IMMUNIZATION  Aged Out     MENINGITIS IMMUNIZATION  Aged Out     Labs reviewed in EPIC      ROS:  CONSTITUTIONAL: NEGATIVE for fever, chills, change in weight  INTEGUMENTARY/SKIN: NEGATIVE for worrisome rashes, moles or lesions  EYES: NEGATIVE for vision changes or irritation  ENT/MOUTH: NEGATIVE for ear, mouth and throat problems  RESP: NEGATIVE for significant cough or SOB  BREAST: NEGATIVE for masses, tenderness or discharge  CV: NEGATIVE for chest pain, palpitations or peripheral edema  GI: NEGATIVE for nausea, abdominal pain, heartburn, or change in bowel habits  : NEGATIVE for frequency, dysuria, or hematuria  MUSCULOSKELETAL: NEGATIVE for significant arthralgias or myalgia  NEURO: NEGATIVE for weakness, dizziness or paresthesias  ENDOCRINE: NEGATIVE for temperature intolerance, skin/hair changes  HEME: NEGATIVE for bleeding problems  PSYCHIATRIC: NEGATIVE for changes in mood or affect    OBJECTIVE:   /78   Pulse 109   Ht 1.702 m (5' 7\")   Wt 89.4 kg (197 lb)   SpO2 94%   BMI 30.85 kg/m   Estimated body mass index is 30.85 kg/m  as calculated " "from the following:    Height as of this encounter: 1.702 m (5' 7\").    Weight as of this encounter: 89.4 kg (197 lb).  EXAM:   GENERAL: healthy, alert and no distress  EYES: Eyes grossly normal to inspection, PERRL and conjunctivae and sclerae normal  HENT: ear canals and TM's normal, nose and mouth without ulcers or lesions  NECK: no adenopathy, no asymmetry, masses, or scars and thyroid normal to palpation  RESP: lungs clear to auscultation - no rales, rhonchi or wheezes  CV: regular rate and rhythm, normal S1 S2, no S3 or S4, no murmur, click or rub, no peripheral edema and peripheral pulses strong  ABDOMEN: soft, nontender, no hepatosplenomegaly, no masses and bowel sounds normal  MS: no gross musculoskeletal defects noted, no edema  SKIN: no suspicious lesions or rashes  NEURO: Normal strength and tone, mentation intact and speech normal  PSYCH: mentation appears normal, affect normal/bright    Diagnostic Test Results:  Labs reviewed in Epic    ASSESSMENT / PLAN:       ICD-10-CM    1. Encounter for Medicare annual wellness exam  Z00.00    2. Proteinuria, unspecified type  R80.9 Albumin Random Urine Quantitative with Creat Ratio   3. Elevated glucose  R73.09 HI FOOT EXAM NO CHARGE   4. Hypertension, Benign  I10        Patient has been advised of split billing requirements and indicates understanding:     COUNSELING:  Reviewed preventive health counseling, as reflected in patient instructions    Estimated body mass index is 30.85 kg/m  as calculated from the following:    Height as of this encounter: 1.702 m (5' 7\").    Weight as of this encounter: 89.4 kg (197 lb).        He reports that he quit smoking about 45 years ago. His smoking use included cigarettes. He started smoking about 71 years ago. He has a 52.00 pack-year smoking history. He has never used smokeless tobacco.    Appropriate preventive services were discussed with this patient, including applicable screening as appropriate for cardiovascular " disease, diabetes, osteopenia/osteoporosis, and glaucoma.  As appropriate for age/gender, discussed screening for colorectal cancer, prostate cancer, breast cancer, and cervical cancer. Checklist reviewing preventive services available has been given to the patient.    Reviewed patients plan of care and provided an AVS. The Basic Care Plan (routine screening as documented in Health Maintenance) for Kenyon meets the Care Plan requirement. This Care Plan has been established and reviewed with the Patient.    Counseling Resources:  ATP IV Guidelines  Pooled Cohorts Equation Calculator  Breast Cancer Risk Calculator  BRCA-Related Cancer Risk Assessment: FHS-7 Tool  FRAX Risk Assessment  ICSI Preventive Guidelines  Dietary Guidelines for Americans, 2010  USDA's MyPlate  ASA Prophylaxis  Lung CA Screening    Eric Bernal MD  [facility]

## 2020-11-10 NOTE — PROGRESS NOTES
Occupational Visit     SUBJECTIVE:  Kenyon Rivera, 86 year old, male is seen for follow up of occupational injury. Date of injury is 5/31/1975.    Linked Episodes   Type: Episode: Status: Noted: Resolved: Last update: Updated by:   WORK COMP [employer] Active 5/31/1975  11/10/2020 12:21 PM Orly Domingo LPN      Comments:       Musculoskeletal problem/pain      Duration: 5/31/1975    Description  Location: low back     Intensity:  moderate    Accompanying signs and symptoms: numbness and tingling    History  Previous similar problem: YES  Previous evaluation:  x-ray and MRI    Precipitating or alleviating factors:  Trauma or overuse: YES- work injury  Aggravating factors include: change in the weather    Therapies tried and outcome: tramadol, tylenol  Pt would like to try PT        Allergies   Allergen Reactions     Chlorzoxazone      Parafon Forte         Review of Systems:  Constitutional, HEENT, cardiovascular, pulmonary, gi and gu systems are negative, except as otherwise noted.      OBJECTIVE:  Vitals:    11/16/20 1031   BP: 136/78   Pulse: 109   SpO2: 94%               Exam:  Gait stable.  Rises slowly from chair.  Scars noted low back.  No tenderness.        Labs: No results found for this or any previous visit (from the past 24 hour(s)).      ASSESSMENT/PLAN:  (G89.29) Other chronic pain  (primary encounter diagnosis)  Comment: reviewed.   Plan: Pain Drug Scr UR W Rptd Meds, CHIROPRACTIC         REFERRAL        Stable. Update UDS and contract.  Reliable use of medicine.      (M54.41,  G89.29) Chronic bilateral low back pain with right-sided sciatica  Comment: as above.    Plan: PHYSICAL THERAPY REFERRAL, CHIROPRACTIC         REFERRAL        As above.  PT and chiropractic continue.  Reliable use of ultram without side effects.  It continues to help, though the pain certainly remains.

## 2020-11-12 DIAGNOSIS — E11.69 TYPE 2 DIABETES MELLITUS WITH OTHER SPECIFIED COMPLICATION, UNSPECIFIED WHETHER LONG TERM INSULIN USE (H): ICD-10-CM

## 2020-11-12 LAB
ALBUMIN SERPL-MCNC: 3.4 G/DL (ref 3.4–5)
ALP SERPL-CCNC: 116 U/L (ref 40–150)
ALT SERPL W P-5'-P-CCNC: 18 U/L (ref 0–70)
ANION GAP SERPL CALCULATED.3IONS-SCNC: 7 MMOL/L (ref 3–14)
AST SERPL W P-5'-P-CCNC: 20 U/L (ref 0–45)
BILIRUB SERPL-MCNC: 0.3 MG/DL (ref 0.2–1.3)
BUN SERPL-MCNC: 25 MG/DL (ref 7–30)
CALCIUM SERPL-MCNC: 8.9 MG/DL (ref 8.5–10.1)
CHLORIDE SERPL-SCNC: 105 MMOL/L (ref 94–109)
CHOLEST SERPL-MCNC: 165 MG/DL
CO2 SERPL-SCNC: 27 MMOL/L (ref 20–32)
CREAT SERPL-MCNC: 1.63 MG/DL (ref 0.66–1.25)
CREAT UR-MCNC: 47 MG/DL
EST. AVERAGE GLUCOSE BLD GHB EST-MCNC: 137 MG/DL
GFR SERPL CREATININE-BSD FRML MDRD: 37 ML/MIN/{1.73_M2}
GLUCOSE SERPL-MCNC: 111 MG/DL (ref 70–99)
HBA1C MFR BLD: 6.4 % (ref 0–5.6)
HDLC SERPL-MCNC: 36 MG/DL
LDLC SERPL CALC-MCNC: 87 MG/DL
MICROALBUMIN UR-MCNC: 20 MG/L
MICROALBUMIN/CREAT UR: 42.95 MG/G CR (ref 0–17)
NONHDLC SERPL-MCNC: 129 MG/DL
POTASSIUM SERPL-SCNC: 4.3 MMOL/L (ref 3.4–5.3)
PROT SERPL-MCNC: 8.2 G/DL (ref 6.8–8.8)
SODIUM SERPL-SCNC: 139 MMOL/L (ref 133–144)
T4 FREE SERPL-MCNC: 1.03 NG/DL (ref 0.76–1.46)
TRIGL SERPL-MCNC: 211 MG/DL
TSH SERPL DL<=0.005 MIU/L-ACNC: 5.69 MU/L (ref 0.4–4)

## 2020-11-12 PROCEDURE — 83036 HEMOGLOBIN GLYCOSYLATED A1C: CPT | Mod: ZL | Performed by: FAMILY MEDICINE

## 2020-11-12 PROCEDURE — 36415 COLL VENOUS BLD VENIPUNCTURE: CPT | Mod: ZL | Performed by: FAMILY MEDICINE

## 2020-11-12 PROCEDURE — 80053 COMPREHEN METABOLIC PANEL: CPT | Mod: ZL | Performed by: FAMILY MEDICINE

## 2020-11-12 PROCEDURE — 999N001182 HC STATISTIC ESTIMATED AVERAGE GLUCOSE: Mod: ZL | Performed by: FAMILY MEDICINE

## 2020-11-12 PROCEDURE — 84439 ASSAY OF FREE THYROXINE: CPT | Mod: ZL | Performed by: FAMILY MEDICINE

## 2020-11-12 PROCEDURE — 82043 UR ALBUMIN QUANTITATIVE: CPT | Mod: ZL | Performed by: FAMILY MEDICINE

## 2020-11-12 PROCEDURE — 80061 LIPID PANEL: CPT | Mod: ZL | Performed by: FAMILY MEDICINE

## 2020-11-12 PROCEDURE — 84443 ASSAY THYROID STIM HORMONE: CPT | Mod: ZL | Performed by: FAMILY MEDICINE

## 2020-11-16 ENCOUNTER — OFFICE VISIT (OUTPATIENT)
Dept: FAMILY MEDICINE | Facility: OTHER | Age: 85
End: 2020-11-16
Attending: FAMILY MEDICINE
Payer: OTHER MISCELLANEOUS

## 2020-11-16 ENCOUNTER — OFFICE VISIT (OUTPATIENT)
Dept: FAMILY MEDICINE | Facility: OTHER | Age: 85
End: 2020-11-16
Attending: FAMILY MEDICINE
Payer: COMMERCIAL

## 2020-11-16 VITALS
HEART RATE: 109 BPM | SYSTOLIC BLOOD PRESSURE: 136 MMHG | WEIGHT: 197 LBS | DIASTOLIC BLOOD PRESSURE: 78 MMHG | BODY MASS INDEX: 30.85 KG/M2 | OXYGEN SATURATION: 94 %

## 2020-11-16 VITALS
SYSTOLIC BLOOD PRESSURE: 136 MMHG | DIASTOLIC BLOOD PRESSURE: 78 MMHG | HEART RATE: 109 BPM | OXYGEN SATURATION: 94 % | HEIGHT: 67 IN | WEIGHT: 197 LBS | BODY MASS INDEX: 30.92 KG/M2

## 2020-11-16 DIAGNOSIS — R80.9 PROTEINURIA, UNSPECIFIED TYPE: ICD-10-CM

## 2020-11-16 DIAGNOSIS — I10 ESSENTIAL HYPERTENSION, BENIGN: ICD-10-CM

## 2020-11-16 DIAGNOSIS — R73.09 ELEVATED GLUCOSE: ICD-10-CM

## 2020-11-16 DIAGNOSIS — M54.41 CHRONIC BILATERAL LOW BACK PAIN WITH RIGHT-SIDED SCIATICA: ICD-10-CM

## 2020-11-16 DIAGNOSIS — G89.29 CHRONIC BILATERAL LOW BACK PAIN WITH RIGHT-SIDED SCIATICA: ICD-10-CM

## 2020-11-16 DIAGNOSIS — Z00.00 ENCOUNTER FOR MEDICARE ANNUAL WELLNESS EXAM: Primary | ICD-10-CM

## 2020-11-16 DIAGNOSIS — G89.29 OTHER CHRONIC PAIN: Primary | ICD-10-CM

## 2020-11-16 LAB
CREAT UR-MCNC: 31 MG/DL
MICROALBUMIN UR-MCNC: 22 MG/L
MICROALBUMIN/CREAT UR: 72.26 MG/G CR (ref 0–17)

## 2020-11-16 PROCEDURE — G0439 PPPS, SUBSEQ VISIT: HCPCS | Performed by: FAMILY MEDICINE

## 2020-11-16 PROCEDURE — G0463 HOSPITAL OUTPT CLINIC VISIT: HCPCS

## 2020-11-16 PROCEDURE — 99213 OFFICE O/P EST LOW 20 MIN: CPT | Performed by: FAMILY MEDICINE

## 2020-11-16 PROCEDURE — 80307 DRUG TEST PRSMV CHEM ANLYZR: CPT | Mod: 90 | Performed by: FAMILY MEDICINE

## 2020-11-16 PROCEDURE — 82043 UR ALBUMIN QUANTITATIVE: CPT | Mod: ZL | Performed by: FAMILY MEDICINE

## 2020-11-16 RX ORDER — GUAIFENESIN 600 MG/1
1200 TABLET, EXTENDED RELEASE ORAL 2 TIMES DAILY
COMMUNITY

## 2020-11-16 ASSESSMENT — MIFFLIN-ST. JEOR: SCORE: 1532.22

## 2020-11-16 ASSESSMENT — ANXIETY QUESTIONNAIRES
GAD7 TOTAL SCORE: 2
6. BECOMING EASILY ANNOYED OR IRRITABLE: NOT AT ALL
2. NOT BEING ABLE TO STOP OR CONTROL WORRYING: NOT AT ALL
1. FEELING NERVOUS, ANXIOUS, OR ON EDGE: SEVERAL DAYS
7. FEELING AFRAID AS IF SOMETHING AWFUL MIGHT HAPPEN: NOT AT ALL
IF YOU CHECKED OFF ANY PROBLEMS ON THIS QUESTIONNAIRE, HOW DIFFICULT HAVE THESE PROBLEMS MADE IT FOR YOU TO DO YOUR WORK, TAKE CARE OF THINGS AT HOME, OR GET ALONG WITH OTHER PEOPLE: NOT DIFFICULT AT ALL
3. WORRYING TOO MUCH ABOUT DIFFERENT THINGS: SEVERAL DAYS
5. BEING SO RESTLESS THAT IT IS HARD TO SIT STILL: NOT AT ALL

## 2020-11-16 ASSESSMENT — PATIENT HEALTH QUESTIONNAIRE - PHQ9
5. POOR APPETITE OR OVEREATING: NOT AT ALL
SUM OF ALL RESPONSES TO PHQ QUESTIONS 1-9: 3

## 2020-11-16 ASSESSMENT — PAIN SCALES - GENERAL
PAINLEVEL: MILD PAIN (2)
PAINLEVEL: MILD PAIN (2)

## 2020-11-16 NOTE — NURSING NOTE
"Chief Complaint   Patient presents with     Work Comp       Initial /78   Pulse 109   Wt 89.4 kg (197 lb)   SpO2 94%   BMI 30.85 kg/m   Estimated body mass index is 30.85 kg/m  as calculated from the following:    Height as of an earlier encounter on 11/16/20: 1.702 m (5' 7\").    Weight as of this encounter: 89.4 kg (197 lb).  Medication Reconciliation: complete  Gladis Hernandez LPN  "

## 2020-11-16 NOTE — NURSING NOTE
"Chief Complaint   Patient presents with     Physical       Initial /78   Pulse 109   Ht 1.702 m (5' 7\")   Wt 89.4 kg (197 lb)   SpO2 94%   BMI 30.85 kg/m   Estimated body mass index is 30.85 kg/m  as calculated from the following:    Height as of this encounter: 1.702 m (5' 7\").    Weight as of this encounter: 89.4 kg (197 lb).  Medication Reconciliation: complete  Gladis Hernandez LPN  "

## 2020-11-16 NOTE — LETTER
[facility] [place]  11/16/20    Patient: Kenyon Rivera  YOB: 1934  Medical Record Number: [id number]                                                                  Opioid / Opioid Plus Controlled Substance Agreement    I understand that my care provider has prescribed an opioid (narcotic) controlled substance to help manage my condition(s). I am taking this medicine to help me function or work. I know this is strong medicine, and that it can cause serious side effects. Opioid medicine can be sedating, addicting and may cause a dependency on the drug. They can affect my ability to drive or think, and cause depression. They need to be taken exactly as prescribed. Combining opioids with certain medicines or chemicals (such as cocaine, sedatives and tranquilizers, sleeping pills, meth) can be dangerous or even fatal. Also, if I stop opioids suddenly, I may have severe withdrawal symptoms. Last, I understand that opioids do not work for all types of pain nor for all patients. If not helpful, I may be asked to stop them.        The risks, benefits, and side effects of these medicine(s) were explained to me. I agree that:    1. I will take part in other treatments as advised by my care team. This may be psychiatry or counseling, physical therapy, behavioral therapy, group treatment or a referral to a pain clinic. I will reduce or stop my medicine when my care team tells me to do so.  2. I will take my medicines as prescribed. I will not change the dose or schedule unless my care team tells me to. There will be no refills if I  run out early.   I may be contactedwithout warning and asked to complete a urine drug test or pill count at any time.   3. I will keep all my appointments, and understand this is part of the monitoring of opioids. My care team may require an office visit for EVERY opioid/controlled substance refill. If I miss appointments or don t follow instructions, my care team may stop my  medicine.  4. I will not ask other providers to prescribe controlled substances, and I will not accept controlled substances from other people. If I need another prescribed controlled substance for a new reason, I will tell my care team within 1 business day.  5. I will use one pharmacy to fill all of my controlled substance prescriptions, and it is up to me to make sure that I do not run out of my medicines on weekends or holidays. If my care team is willing to refill my opioid prescription without a visit, I must request refills only during office hours, refills may take up to 3 days to process, and it may take up to 5 to 7 days for my medicine to be mailed and ready at my pharmacy. Prescriptions will not be mailed anywhere except my pharmacy.        [id number]  Rev 12/18         Registration to scan to EHR                             Page 1 of 2               Controlled Substance Agreement Opioid        [facility] [place]  11/16/20  Patient: Kenyon Rivera  YOB: 1934  Medical Record Number: [id number]                                                                  6. I am responsible for my prescriptions. If the medicine/prescription is lost or stolen, it will not be replaced. I also agree not to share controlled substance medicines with anyone.  7. I agree to not use ANY illegal or recreational drugs. This includes marijuana, cocaine, bath salts or other drugs. I agree not to use alcohol unless my care team says I may.          I agree to give urine samples whenever asked. If I don t give a urine sample, the care team may stop my medicine.    8. If I enroll in the [place] Medical Marijuana program, I will tell my care team. I will also sign an agreement to share my medical records with my care team.   9. I will bring in my list of medicines (or my medicine bottles) each time I come to the clinic.   10. I will tell my care team right away if I become pregnant or have a new medical problem treated outside  of my regular clinic.  11. I understand that this medicine can affect my thinking and judgment. It may be unsafe for me to drive, use machinery and do dangerous tasks. I will not do any of these things until I know how the medicine affects me. If my dose changes, I will wait to see how it affects me. I will contact my care team if I have concerns about medicine side effects.    I understand that if I do not follow any of the conditions above, my prescriptions or treatment may be stopped.      I agree that my provider, clinic care team, and pharmacy may work with any city, state or federal law enforcement agency that investigates the misuse, sale, or other diversion of my controlled medicine. I will allow my provider to discuss my care with or share a copy of this agreement with any other treating provider, pharmacy or emergency room where I receive care. I agree to give up (waive) any right of privacy or confidentiality with respect to these consents.     I have read this agreement and have asked questions about anything I did not understand.      ________________________________________________________________________  Patient signature - Date/Time -  Kenyon Rivera                                      ________________________________________________________________________  Witness signature                                                            ________________________________________________________________________  Provider signature - Eric Bernal MD      [id number]  Rev 12/18         Registration to scan to EHR                         Page 2 of 2                   Controlled Substance Agreement Opioid           Page 1 of 2  Opioid Pain Medicines (also known as Narcotics)  What You Need to Know    What are opioids?   Opioids are pain medicines that must be prescribed by a doctor.  They are also known as narcotics.    Examples are:     morphine (MS Contin, Evelyn)    oxycodone (Oxycontin)    oxycodone  and acetaminophen (Percocet)    hydrocodone and acetaminophen (Vicodin, Norco)     fentanyl patch (Duragesic)     hydromorphone (Dilaudid)     methadone     What do opioids do well?   Opioids are best for short-term pain after a surgery or injury. They also work well for cancer pain. Unlike other pain medicines, they do not cause liver or kidney failure or ulcers. They may help some people with long-lasting (chronic) pain.     What do opioids NOT do well?   Opioids never get rid of pain entirely, and they do not work well for most patients with chronic pain. Opioids do not reduce swelling, one of the causes of pain. They also don t work well for nerve pain.                           For informational purposes only.  Not to replace the advice of your care provider.  Copyright 201 [facility]. All right reserved. [email or web address] [id number] 02/18.      Page 2 of 2    Risks and side effects   Talk to your doctor before you start or decide to keep taking one of these medicines. Side effects include:    Lowering your breathing rate enough to cause death    Overdose, including death, especially if taking higher than prescribed doses    Long-term opioid use    Worse depression symptoms; less pleasure in things you usually enjoy    Feeling tired or sluggish    Slower thoughts or cloudy thinking    Being more sensitive to pain over time; pain is harder to control    Trouble sleeping or restless sleep    Changes in hormone levels (for example, less testosterone)    Changes in sex drive or ability to have sex    Constipation    Unsafe driving    Itching and sweating    Feeling dizzy    Nausea, vomiting and dry mouth    What else should I know about opioids?  When someone takes opioids for too long or too often, they become dependent. This means that if you stop or reduce the medicine too quickly, you will have withdrawal symptoms.    Dependence is not the same as addiction. Addiction is when people keep using a  substance that harms their body, their mind or their relations with others. If you have a history of drug or alcohol abuse, taking opioids can cause a relapse.    Over time, opioids don t work as well. Most people will need higher and higher doses. The higher the dose, the more serious the side effects. We don t know the long-term effects of opioids.      Prescribed opioids aren't the best way to manage chronic pain    Other ways to manage pain include:      Ibuprofen or acetaminophen.  You should always try this first.      Treat health problems that may be causing pain.      acupuncture or massage, deep breathing, meditation, visual imagery, aromatherapy.      Use heat or ice at the pain site      Physical therapy and exercise      Stop smoking      See a counselor or therapist                                                  People who have used opioids for a long time may have a lower quality of life, worse depression, higher levels of pain and more visits to doctors.    Never share your opioids with others. Be sure to store opioids in a secure place, locked if possible.Young children can easily swallow them and overdose.     You can overdose on opioids.  Signs of overdose include decrease or loss of consciousness, slowed breathing, trouble waking and blue lips.  If someone is worried about overdose, they should call 911.    If you are at risk for overdose, you may get naloxone (Narcan, a medicine that reverses the effects of opioids.  If you overdose, a friend or family member can give you Narcan while waiting for the ambulance.  They need to know the signs of overdose and how to give Narcan.    While you're taking opioids:    Don't use alcohol or street drugs. Taking them together can cause death.    Don't take any of these medicines unless your doctor says its okay.  Taking these with opioids can cause death.    Benzodiazepines (such as lorazepam         or diazepam)    Muscle relaxers (such as  cyclobenzaprine)    sleeping pills    other opioids    Safe disposal of opioids  Find your area drug take-back program, your pharmacy mail-back program, buy a special disposal bag (such as Deterra) from your pharmacy or flush them down the toilet.  Use the guidelines at:  www.fda.gov/drugs/resourcesforyou

## 2020-11-17 DIAGNOSIS — R80.9 PROTEINURIA, UNSPECIFIED TYPE: Primary | ICD-10-CM

## 2020-11-17 RX ORDER — LISINOPRIL 20 MG/1
20 TABLET ORAL DAILY
Qty: 30 TABLET | Refills: 0 | Status: SHIPPED | OUTPATIENT
Start: 2020-11-17 | End: 2020-12-01

## 2020-11-17 RX ORDER — LISINOPRIL 20 MG/1
20 TABLET ORAL DAILY
Qty: 90 TABLET | Refills: 3 | Status: SHIPPED | OUTPATIENT
Start: 2020-11-17 | End: 2020-12-21

## 2020-11-17 ASSESSMENT — ANXIETY QUESTIONNAIRES: GAD7 TOTAL SCORE: 2

## 2020-11-20 LAB — PAIN DRUG SCR UR W RPTD MEDS: NORMAL

## 2020-11-25 NOTE — PROGRESS NOTES
"Subjective     Kenyon Rivera is a 86 year old male who presents to clinic today for the following health issues:    HPI         Medication Followup of lisinopril     Taking Medication as prescribed: yes    Side Effects:  None    Medication Helping Symptoms:  yes       Medication Followup of pepcid    Taking Medication as prescribed: yes    Side Effects:  None    Medication Helping Symptoms:  NO       Review of Systems   Constitutional, HEENT, cardiovascular, pulmonary, gi and gu systems are negative, except as otherwise noted.      Objective    /68   Pulse 98   Wt 90.3 kg (199 lb)   SpO2 95%   BMI 31.17 kg/m    Body mass index is 31.17 kg/m .  Physical Exam   GENERAL: healthy, alert and no distress  NECK: no adenopathy, no asymmetry, masses, or scars and thyroid normal to palpation  RESP: lungs clear to auscultation - no rales, rhonchi or wheezes  CV: regular rate and rhythm, normal S1 S2, no S3 or S4, no murmur, click or rub, no peripheral edema and peripheral pulses strong  ABDOMEN: soft, nontender, no hepatosplenomegaly, no masses and bowel sounds normal  MS: no gross musculoskeletal defects noted, no edema    Bmp pending.         Assessment & Plan     Proteinuria, unspecified type  Reviewed and discussed.  The lisinopril is being tolerated.      Gastroesophageal reflux disease without esophagitis  Stable.  Update pepcid.    - Basic metabolic panel  - famotidine (PEPCID) 40 MG tablet; Take 1 tablet (40 mg) by mouth nightly as needed    Type 2 diabetes mellitus without complication, without long-term current use of insulin (H)  Stable.         BMI:   Estimated body mass index is 31.17 kg/m  as calculated from the following:    Height as of 11/16/20: 1.702 m (5' 7\").    Weight as of this encounter: 90.3 kg (199 lb).                No follow-ups on file.    Eric Bernal MD  [facility]    "

## 2020-12-01 DIAGNOSIS — R80.9 PROTEINURIA, UNSPECIFIED TYPE: ICD-10-CM

## 2020-12-01 RX ORDER — LISINOPRIL 20 MG/1
20 TABLET ORAL DAILY
Qty: 30 TABLET | Refills: 0 | Status: SHIPPED | OUTPATIENT
Start: 2020-12-01 | End: 2020-12-08

## 2020-12-08 ENCOUNTER — OFFICE VISIT (OUTPATIENT)
Dept: FAMILY MEDICINE | Facility: OTHER | Age: 85
End: 2020-12-08
Attending: FAMILY MEDICINE
Payer: COMMERCIAL

## 2020-12-08 VITALS
DIASTOLIC BLOOD PRESSURE: 68 MMHG | OXYGEN SATURATION: 95 % | WEIGHT: 199 LBS | HEART RATE: 98 BPM | BODY MASS INDEX: 31.17 KG/M2 | SYSTOLIC BLOOD PRESSURE: 132 MMHG

## 2020-12-08 DIAGNOSIS — K21.9 GASTROESOPHAGEAL REFLUX DISEASE WITHOUT ESOPHAGITIS: ICD-10-CM

## 2020-12-08 DIAGNOSIS — E11.9 TYPE 2 DIABETES MELLITUS WITHOUT COMPLICATION, WITHOUT LONG-TERM CURRENT USE OF INSULIN (H): ICD-10-CM

## 2020-12-08 DIAGNOSIS — R80.9 PROTEINURIA, UNSPECIFIED TYPE: Primary | ICD-10-CM

## 2020-12-08 PROCEDURE — 80048 BASIC METABOLIC PNL TOTAL CA: CPT | Mod: ZL | Performed by: FAMILY MEDICINE

## 2020-12-08 PROCEDURE — G0463 HOSPITAL OUTPT CLINIC VISIT: HCPCS

## 2020-12-08 PROCEDURE — 36415 COLL VENOUS BLD VENIPUNCTURE: CPT | Mod: ZL | Performed by: FAMILY MEDICINE

## 2020-12-08 PROCEDURE — 99213 OFFICE O/P EST LOW 20 MIN: CPT | Performed by: FAMILY MEDICINE

## 2020-12-08 RX ORDER — FAMOTIDINE 40 MG/1
40 TABLET, FILM COATED ORAL
Qty: 90 TABLET | Refills: 3 | Status: SHIPPED | OUTPATIENT
Start: 2020-12-08 | End: 2021-05-18

## 2020-12-08 ASSESSMENT — PAIN SCALES - GENERAL: PAINLEVEL: MILD PAIN (3)

## 2020-12-08 NOTE — NURSING NOTE
"Chief Complaint   Patient presents with     Recheck Medication       Initial /68   Pulse 98   Wt 90.3 kg (199 lb)   SpO2 95%   BMI 31.17 kg/m   Estimated body mass index is 31.17 kg/m  as calculated from the following:    Height as of 11/16/20: 1.702 m (5' 7\").    Weight as of this encounter: 90.3 kg (199 lb).  Medication Reconciliation: complete  Gladis Hernandez LPN  "

## 2020-12-09 DIAGNOSIS — R79.89 ELEVATED SERUM CREATININE: Primary | ICD-10-CM

## 2020-12-09 LAB
ANION GAP SERPL CALCULATED.3IONS-SCNC: 6 MMOL/L (ref 3–14)
BUN SERPL-MCNC: 30 MG/DL (ref 7–30)
CALCIUM SERPL-MCNC: 8.9 MG/DL (ref 8.5–10.1)
CHLORIDE SERPL-SCNC: 106 MMOL/L (ref 94–109)
CO2 SERPL-SCNC: 28 MMOL/L (ref 20–32)
CREAT SERPL-MCNC: 1.69 MG/DL (ref 0.66–1.25)
GFR SERPL CREATININE-BSD FRML MDRD: 36 ML/MIN/{1.73_M2}
GLUCOSE SERPL-MCNC: 93 MG/DL (ref 70–99)
POTASSIUM SERPL-SCNC: 4.8 MMOL/L (ref 3.4–5.3)
SODIUM SERPL-SCNC: 140 MMOL/L (ref 133–144)

## 2020-12-14 ENCOUNTER — HOSPITAL ENCOUNTER (OUTPATIENT)
Dept: PHYSICAL THERAPY | Facility: HOSPITAL | Age: 85
Setting detail: THERAPIES SERIES
End: 2020-12-14
Attending: FAMILY MEDICINE
Payer: OTHER MISCELLANEOUS

## 2020-12-14 DIAGNOSIS — M54.41 CHRONIC BILATERAL LOW BACK PAIN WITH RIGHT-SIDED SCIATICA: ICD-10-CM

## 2020-12-14 DIAGNOSIS — G89.29 CHRONIC BILATERAL LOW BACK PAIN WITH RIGHT-SIDED SCIATICA: ICD-10-CM

## 2020-12-14 PROCEDURE — 97012 MECHANICAL TRACTION THERAPY: CPT | Mod: GP

## 2020-12-14 PROCEDURE — 97140 MANUAL THERAPY 1/> REGIONS: CPT | Mod: GP,59

## 2020-12-14 PROCEDURE — 97161 PT EVAL LOW COMPLEX 20 MIN: CPT | Mod: GP
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
## 2020-12-17 ENCOUNTER — HOSPITAL ENCOUNTER (OUTPATIENT)
Dept: PHYSICAL THERAPY | Facility: HOSPITAL | Age: 85
Setting detail: THERAPIES SERIES
End: 2020-12-17
Attending: FAMILY MEDICINE
Payer: OTHER MISCELLANEOUS

## 2020-12-17 DIAGNOSIS — M54.41 BILATERAL LOW BACK PAIN WITH RIGHT-SIDED SCIATICA, UNSPECIFIED CHRONICITY: ICD-10-CM

## 2020-12-17 PROCEDURE — 97140 MANUAL THERAPY 1/> REGIONS: CPT | Mod: GP

## 2020-12-17 PROCEDURE — 97012 MECHANICAL TRACTION THERAPY: CPT | Mod: GP

## 2020-12-17 RX ORDER — TRAMADOL HYDROCHLORIDE 50 MG/1
TABLET ORAL
Qty: 90 TABLET | Refills: 0 | Status: SHIPPED | OUTPATIENT
Start: 2020-12-17 | End: 2021-02-10

## 2020-12-17 NOTE — TELEPHONE ENCOUNTER
Tramadol       Last Written Prescription Date:  10/20/2020  Last Fill Quantity: 90,   # refills: 0  Last Office Visit: 12/08/2020

## 2020-12-21 ENCOUNTER — HOSPITAL ENCOUNTER (OUTPATIENT)
Dept: PHYSICAL THERAPY | Facility: HOSPITAL | Age: 85
Setting detail: THERAPIES SERIES
End: 2020-12-21
Attending: FAMILY MEDICINE
Payer: OTHER MISCELLANEOUS

## 2020-12-21 ENCOUNTER — TELEPHONE (OUTPATIENT)
Dept: FAMILY MEDICINE | Facility: OTHER | Age: 85
End: 2020-12-21

## 2020-12-21 DIAGNOSIS — R80.9 PROTEINURIA, UNSPECIFIED TYPE: ICD-10-CM

## 2020-12-21 PROCEDURE — 97140 MANUAL THERAPY 1/> REGIONS: CPT | Mod: GP

## 2020-12-21 PROCEDURE — 97012 MECHANICAL TRACTION THERAPY: CPT | Mod: GP

## 2020-12-21 RX ORDER — LISINOPRIL 20 MG/1
20 TABLET ORAL DAILY
Qty: 90 TABLET | Refills: 3 | Status: SHIPPED | OUTPATIENT
Start: 2020-12-21 | End: 2020-12-31

## 2020-12-28 ENCOUNTER — HOSPITAL ENCOUNTER (OUTPATIENT)
Dept: PHYSICAL THERAPY | Facility: HOSPITAL | Age: 85
Setting detail: THERAPIES SERIES
End: 2020-12-28
Attending: FAMILY MEDICINE
Payer: OTHER MISCELLANEOUS

## 2020-12-28 PROCEDURE — 97140 MANUAL THERAPY 1/> REGIONS: CPT | Mod: GP,59

## 2020-12-28 PROCEDURE — 97012 MECHANICAL TRACTION THERAPY: CPT | Mod: GP

## 2020-12-30 ENCOUNTER — HOSPITAL ENCOUNTER (OUTPATIENT)
Dept: PHYSICAL THERAPY | Facility: HOSPITAL | Age: 85
Setting detail: THERAPIES SERIES
End: 2020-12-30
Attending: FAMILY MEDICINE
Payer: OTHER MISCELLANEOUS

## 2020-12-30 PROCEDURE — 97012 MECHANICAL TRACTION THERAPY: CPT | Mod: GP

## 2020-12-30 PROCEDURE — 97140 MANUAL THERAPY 1/> REGIONS: CPT | Mod: GP

## 2021-01-04 ENCOUNTER — HOSPITAL ENCOUNTER (OUTPATIENT)
Dept: PHYSICAL THERAPY | Facility: HOSPITAL | Age: 86
Setting detail: THERAPIES SERIES
End: 2021-01-04
Attending: FAMILY MEDICINE
Payer: OTHER MISCELLANEOUS

## 2021-01-04 PROCEDURE — 97012 MECHANICAL TRACTION THERAPY: CPT | Mod: GP

## 2021-01-04 PROCEDURE — 97140 MANUAL THERAPY 1/> REGIONS: CPT | Mod: GP,59

## 2021-01-06 ENCOUNTER — HOSPITAL ENCOUNTER (OUTPATIENT)
Dept: PHYSICAL THERAPY | Facility: HOSPITAL | Age: 86
Setting detail: THERAPIES SERIES
End: 2021-01-06
Attending: FAMILY MEDICINE
Payer: OTHER MISCELLANEOUS

## 2021-01-06 PROCEDURE — 97012 MECHANICAL TRACTION THERAPY: CPT | Mod: GP

## 2021-01-06 PROCEDURE — 97140 MANUAL THERAPY 1/> REGIONS: CPT | Mod: GP

## 2021-01-11 ENCOUNTER — HOSPITAL ENCOUNTER (OUTPATIENT)
Dept: PHYSICAL THERAPY | Facility: HOSPITAL | Age: 86
Setting detail: THERAPIES SERIES
End: 2021-01-11
Attending: FAMILY MEDICINE
Payer: OTHER MISCELLANEOUS

## 2021-01-11 PROCEDURE — 97012 MECHANICAL TRACTION THERAPY: CPT | Mod: GP

## 2021-01-11 PROCEDURE — 97140 MANUAL THERAPY 1/> REGIONS: CPT | Mod: GP

## 2021-01-13 ENCOUNTER — HOSPITAL ENCOUNTER (OUTPATIENT)
Dept: PHYSICAL THERAPY | Facility: HOSPITAL | Age: 86
Setting detail: THERAPIES SERIES
End: 2021-01-13
Attending: FAMILY MEDICINE
Payer: OTHER MISCELLANEOUS

## 2021-01-13 PROCEDURE — 97140 MANUAL THERAPY 1/> REGIONS: CPT | Mod: GP,CQ

## 2021-01-13 PROCEDURE — 97012 MECHANICAL TRACTION THERAPY: CPT | Mod: GP,CQ

## 2021-01-18 ENCOUNTER — HOSPITAL ENCOUNTER (OUTPATIENT)
Dept: PHYSICAL THERAPY | Facility: HOSPITAL | Age: 86
Setting detail: THERAPIES SERIES
End: 2021-01-18
Attending: FAMILY MEDICINE
Payer: OTHER MISCELLANEOUS

## 2021-01-18 PROCEDURE — 97140 MANUAL THERAPY 1/> REGIONS: CPT | Mod: GP

## 2021-01-18 PROCEDURE — 97012 MECHANICAL TRACTION THERAPY: CPT | Mod: GP

## 2021-01-20 ENCOUNTER — HOSPITAL ENCOUNTER (OUTPATIENT)
Dept: PHYSICAL THERAPY | Facility: HOSPITAL | Age: 86
Setting detail: THERAPIES SERIES
End: 2021-01-20
Attending: FAMILY MEDICINE
Payer: OTHER MISCELLANEOUS

## 2021-01-20 PROCEDURE — 97012 MECHANICAL TRACTION THERAPY: CPT | Mod: GP

## 2021-01-20 PROCEDURE — 97140 MANUAL THERAPY 1/> REGIONS: CPT | Mod: GP

## 2021-01-25 ENCOUNTER — HOSPITAL ENCOUNTER (OUTPATIENT)
Dept: PHYSICAL THERAPY | Facility: HOSPITAL | Age: 86
Setting detail: THERAPIES SERIES
End: 2021-01-25
Attending: FAMILY MEDICINE
Payer: OTHER MISCELLANEOUS

## 2021-01-25 PROCEDURE — 97140 MANUAL THERAPY 1/> REGIONS: CPT | Mod: GP,59

## 2021-01-25 PROCEDURE — 97012 MECHANICAL TRACTION THERAPY: CPT | Mod: GP

## 2021-01-25 NOTE — PROGRESS NOTES
Outpatient Physical Therapy Discharge Note     Patient: Kenyon Rivera  : 1934    Beginning/End Dates of Reporting Period:  2020 to 2021    Referring Physician: Dr. Bernal  Orders: Eval and Treat  Medical Diagnosis: Chronic bilateral low back pain with right-sided sciatica [M54.41, G89.29]   Onset of Illness/Injury:  with increased pain last several months      Client Self Report: Patient reports that he is feeling very good today. Minimal to no pain.     Objective Measurements:  Full functional and painfree lumbar flexion. Extension back to baseline. Good HS length and hip flexor length bilaterally.     Assessment: Patient has met long term goals. Appropriate for discharge.     Goals:  Goals:   STG 1: 2 weeks   Patient will be independent and compliant with HEP.      LT weeks   LTG 1: Patient will be able to perform prolonged standing and walking in home for IADLs and ADLs without limitation from back pain.   Goal Met 2021  LTG 2: Patient will be able to return to gym and recreational exercise program with good tolerance.   Goal Met 2021  LTG 3: Patient will report pain reduction and return to baseline activity tolerance.   Goal Met 2021    Plan:  Discharge from therapy.    Discharge:    Reason for Discharge: Patient has met all goals.    Equipment Issued: HEP    Discharge Plan: Patient to continue home program and recreational exercise program at [facility].

## 2021-02-05 NOTE — PROGRESS NOTES
"  Assessment & Plan     Type 2 diabetes mellitus without complication, without long-term current use of insulin (H)  Reviewed.  Update labs.  Discussed protein, lisinopril, and the like.   - Basic metabolic panel  - Hemoglobin A1c    Proteinuria, unspecified type  As above.  Started the lisinopril.  [facility]  I continued at that dose and got bmp today.  Urine in 3 months.    - lisinopril (ZESTRIL) 20 MG tablet; Take 0.5 tablets (10 mg) by mouth daily    Hypertension, Benign  Stable.                BMI:   Estimated body mass index is 30.7 kg/m  as calculated from the following:    Height as of this encounter: 1.702 m (5' 7\").    Weight as of this encounter: 88.9 kg (196 lb).           No follow-ups on file.    Eric Bernal MD  [facility]    Toyin Walker is a 86 year old who presents to clinic today for the following health issues     HPI       Diabetes Follow-up      How often are you checking your blood sugar? Not at all    What concerns do you have today about your diabetes? None     Do you have any of these symptoms? (Select all that apply)  No numbness or tingling in feet.  No redness, sores or blisters on feet.  No complaints of excessive thirst.  No reports of blurry vision.  No significant changes to weight.      BP Readings from Last 2 Encounters:   02/16/21 122/60   12/08/20 132/68     Hemoglobin A1C (%)   Date Value   11/12/2020 6.4 (H)   02/23/2017 6.7 (H)     LDL Cholesterol Calculated (mg/dL)   Date Value   11/12/2020 87   11/01/2019 112 (H)               Hypertension Follow-up      Do you check your blood pressure regularly outside of the clinic? Yes     Are you following a low salt diet? Yes    Are your blood pressures ever more than 140 on the top number (systolic) OR more   than 90 on the bottom number (diastolic), for example 140/90? No          Review of Systems   Constitutional, HEENT, cardiovascular, pulmonary, gi and gu systems " "are negative, except as otherwise noted.      Objective    /60   Pulse 103   Ht 1.702 m (5' 7\")   Wt 88.9 kg (196 lb)   SpO2 95%   BMI 30.70 kg/m    Body mass index is 30.7 kg/m .  Physical Exam   GENERAL: healthy, alert and no distress  NECK: no adenopathy, no asymmetry, masses, or scars and thyroid normal to palpation  RESP: lungs clear to auscultation - no rales, rhonchi or wheezes  CV: regular rate and rhythm, normal S1 S2, no S3 or S4, no murmur, click or rub, no peripheral edema and peripheral pulses strong  ABDOMEN: soft, nontender, no hepatosplenomegaly, no masses and bowel sounds normal  MS: no gross musculoskeletal defects noted, no edema        Labs pending.          "

## 2021-02-08 DIAGNOSIS — R80.9 PROTEINURIA, UNSPECIFIED TYPE: ICD-10-CM

## 2021-02-08 RX ORDER — LISINOPRIL 20 MG/1
TABLET ORAL
Qty: 90 TABLET | Refills: 3 | Status: SHIPPED | OUTPATIENT
Start: 2021-02-08 | End: 2021-02-16

## 2021-02-10 DIAGNOSIS — M54.41 BILATERAL LOW BACK PAIN WITH RIGHT-SIDED SCIATICA, UNSPECIFIED CHRONICITY: ICD-10-CM

## 2021-02-10 RX ORDER — TRAMADOL HYDROCHLORIDE 50 MG/1
TABLET ORAL
Qty: 90 TABLET | Refills: 0 | Status: SHIPPED | OUTPATIENT
Start: 2021-02-10 | End: 2021-03-25

## 2021-02-10 NOTE — TELEPHONE ENCOUNTER
Tramadol  Last Written Prescription Date: 12/17/20  Last Fill Quantity: 90 # of Refills: 0  Last Office Visit: 12/8/20

## 2021-02-16 ENCOUNTER — OFFICE VISIT (OUTPATIENT)
Dept: FAMILY MEDICINE | Facility: OTHER | Age: 86
End: 2021-02-16
Attending: FAMILY MEDICINE
Payer: COMMERCIAL

## 2021-02-16 VITALS
BODY MASS INDEX: 30.76 KG/M2 | SYSTOLIC BLOOD PRESSURE: 122 MMHG | HEIGHT: 67 IN | WEIGHT: 196 LBS | HEART RATE: 103 BPM | DIASTOLIC BLOOD PRESSURE: 60 MMHG | OXYGEN SATURATION: 95 %

## 2021-02-16 DIAGNOSIS — R79.89 ELEVATED SERUM CREATININE: Primary | ICD-10-CM

## 2021-02-16 DIAGNOSIS — R80.9 PROTEINURIA, UNSPECIFIED TYPE: ICD-10-CM

## 2021-02-16 DIAGNOSIS — E11.9 TYPE 2 DIABETES MELLITUS WITHOUT COMPLICATION, WITHOUT LONG-TERM CURRENT USE OF INSULIN (H): Primary | ICD-10-CM

## 2021-02-16 DIAGNOSIS — I10 ESSENTIAL HYPERTENSION, BENIGN: ICD-10-CM

## 2021-02-16 LAB
ANION GAP SERPL CALCULATED.3IONS-SCNC: 5 MMOL/L (ref 3–14)
BUN SERPL-MCNC: 34 MG/DL (ref 7–30)
CALCIUM SERPL-MCNC: 9.2 MG/DL (ref 8.5–10.1)
CHLORIDE SERPL-SCNC: 104 MMOL/L (ref 94–109)
CO2 SERPL-SCNC: 27 MMOL/L (ref 20–32)
CREAT SERPL-MCNC: 1.78 MG/DL (ref 0.66–1.25)
EST. AVERAGE GLUCOSE BLD GHB EST-MCNC: 128 MG/DL
GFR SERPL CREATININE-BSD FRML MDRD: 34 ML/MIN/{1.73_M2}
GLUCOSE SERPL-MCNC: 110 MG/DL (ref 70–99)
HBA1C MFR BLD: 6.1 % (ref 0–5.6)
POTASSIUM SERPL-SCNC: 4.8 MMOL/L (ref 3.4–5.3)
SODIUM SERPL-SCNC: 136 MMOL/L (ref 133–144)

## 2021-02-16 PROCEDURE — 80048 BASIC METABOLIC PNL TOTAL CA: CPT | Mod: ZL | Performed by: FAMILY MEDICINE

## 2021-02-16 PROCEDURE — 36415 COLL VENOUS BLD VENIPUNCTURE: CPT | Mod: ZL | Performed by: FAMILY MEDICINE

## 2021-02-16 PROCEDURE — 83036 HEMOGLOBIN GLYCOSYLATED A1C: CPT | Mod: ZL | Performed by: FAMILY MEDICINE

## 2021-02-16 PROCEDURE — 999N001182 HC STATISTIC ESTIMATED AVERAGE GLUCOSE: Mod: ZL | Performed by: FAMILY MEDICINE

## 2021-02-16 PROCEDURE — 99214 OFFICE O/P EST MOD 30 MIN: CPT | Performed by: FAMILY MEDICINE

## 2021-02-16 PROCEDURE — G0463 HOSPITAL OUTPT CLINIC VISIT: HCPCS

## 2021-02-16 RX ORDER — LISINOPRIL 20 MG/1
10 TABLET ORAL DAILY
Qty: 590 TABLET | Refills: 3 | Status: SHIPPED | OUTPATIENT
Start: 2021-02-16 | End: 2021-05-18 | Stop reason: SINTOL

## 2021-02-16 ASSESSMENT — MIFFLIN-ST. JEOR: SCORE: 1527.68

## 2021-02-16 ASSESSMENT — PAIN SCALES - GENERAL: PAINLEVEL: MILD PAIN (2)

## 2021-02-16 NOTE — NURSING NOTE
"Chief Complaint   Patient presents with     Recheck Medication       Initial /60   Pulse 103   Ht 1.702 m (5' 7\")   Wt 88.9 kg (196 lb)   SpO2 95%   BMI 30.70 kg/m   Estimated body mass index is 30.7 kg/m  as calculated from the following:    Height as of this encounter: 1.702 m (5' 7\").    Weight as of this encounter: 88.9 kg (196 lb).  Medication Reconciliation: complete  Gladis Hernandez LPN  "

## 2021-02-23 DIAGNOSIS — G47.09 OTHER INSOMNIA: ICD-10-CM

## 2021-02-23 RX ORDER — TEMAZEPAM 30 MG
30 CAPSULE ORAL AT BEDTIME
Qty: 15 CAPSULE | Refills: 0 | Status: SHIPPED | OUTPATIENT
Start: 2021-02-23

## 2021-02-23 NOTE — TELEPHONE ENCOUNTER
Patient is waiting for [facility] to [facility] and is requesting a small supply be sent to his local pharmacy. Thank you.    temazepam (RESTORIL) 30 MG capsule      Last Written Prescription Date:  03/25/19  Last Fill Quantity: 15,   # refills: 0  Last Office Visit: 02/16/20  Future Office visit:       Routing refill request to provider for review/approval because:  Drug not on the [facility], P or [facility] refill protocol or controlled substance

## 2021-03-16 DIAGNOSIS — R79.89 ELEVATED SERUM CREATININE: ICD-10-CM

## 2021-03-16 LAB
ANION GAP SERPL CALCULATED.3IONS-SCNC: 6 MMOL/L (ref 3–14)
BUN SERPL-MCNC: 32 MG/DL (ref 7–30)
CALCIUM SERPL-MCNC: 9 MG/DL (ref 8.5–10.1)
CHLORIDE SERPL-SCNC: 103 MMOL/L (ref 94–109)
CO2 SERPL-SCNC: 28 MMOL/L (ref 20–32)
CREAT SERPL-MCNC: 1.61 MG/DL (ref 0.66–1.25)
GFR SERPL CREATININE-BSD FRML MDRD: 38 ML/MIN/{1.73_M2}
GLUCOSE SERPL-MCNC: 129 MG/DL (ref 70–99)
POTASSIUM SERPL-SCNC: 4.4 MMOL/L (ref 3.4–5.3)
SODIUM SERPL-SCNC: 137 MMOL/L (ref 133–144)

## 2021-03-16 PROCEDURE — 36415 COLL VENOUS BLD VENIPUNCTURE: CPT | Mod: ZL | Performed by: FAMILY MEDICINE

## 2021-03-16 PROCEDURE — 80048 BASIC METABOLIC PNL TOTAL CA: CPT | Mod: ZL | Performed by: FAMILY MEDICINE

## 2021-03-18 ENCOUNTER — TRANSFERRED RECORDS (OUTPATIENT)
Dept: HEALTH INFORMATION MANAGEMENT | Facility: CLINIC | Age: 86
End: 2021-03-18

## 2021-03-18 LAB
ALBUMIN (URINE) MG/SPEC: 14.1 MG/L
ALT SERPL-CCNC: 11 U/L (ref 13–61)
AST SERPL-CCNC: 15 U/L (ref 5–34)
CHOLESTEROL (EXTERNAL): 158 MG/DL
CREATININE (EXTERNAL): 1.7 MG/DL (ref 0.7–1.2)
CREATININE (URINE): 71.8 MG/DL (ref 58–161)
GFR ESTIMATED (EXTERNAL): 38 ML/MIN/1.73M2
GLUCOSE (EXTERNAL): 112 MG/DL (ref 70–105)
HBA1C MFR BLD: 6.2 % (ref 4–6)
HDLC SERPL-MCNC: 29 MG/DL
LDL CHOLESTEROL CALCULATED (EXTERNAL): 96 MG/DL
NON HDL CHOLESTEROL (EXTERNAL): 129 MG/DL
POTASSIUM (EXTERNAL): 4.9 MMOL/L (ref 3.5–5.1)
TRIGLYCERIDES (EXTERNAL): 163 MG/DL
TSH SERPL-ACNC: 3.14 MU/L (ref 0.35–4.94)

## 2021-03-19 NOTE — PROGRESS NOTES
Outpatient Physical Therapy Progress Note     Patient: Kenyon Rivera  : 1934    Beginning/End Dates of Reporting Period:  20 to 2021    Referring Physician: Dr. Bernal  Orders: Eval and Treat  Medical Diagnosis: Chronic bilateral low back pain with right-sided sciatica [M54.41, G89.29]   Onset of Illness/Injury:  with increased pain last several months      Client Self Report: Patient reports that he is feeling very good today. Minimal to no pain.     Assessment: Good response to treatment. Return to baseline for motion, symptoms and activity tolerance.     Goals:  Goals:   STG 1: 2 weeks   Patient will be independent and compliant with HEP.      LT weeks   LTG 1: Patient will be able to perform prolonged standing and walking in home for IADLs and ADLs without limitation from back pain.   Goal Met 21  LTG 2: Patient will be able to return to gym and recreational exercise program with good tolerance.   Goal Met 21  LTG 3: Patient will report pain reduction and return to baseline activity tolerance.   Goal Met 21    Plan:  Discharge from therapy.    Discharge:    Reason for Discharge: Patient has met all goals.    Equipment Issued: HEP    Discharge Plan: Patient to continue home program. Returning to gym routine at [facility].

## 2021-03-25 DIAGNOSIS — M54.41 BILATERAL LOW BACK PAIN WITH RIGHT-SIDED SCIATICA, UNSPECIFIED CHRONICITY: ICD-10-CM

## 2021-03-25 RX ORDER — TRAMADOL HYDROCHLORIDE 50 MG/1
TABLET ORAL
Qty: 90 TABLET | Refills: 0 | Status: SHIPPED | OUTPATIENT
Start: 2021-03-25 | End: 2021-05-05

## 2021-03-25 NOTE — TELEPHONE ENCOUNTER
tramadol      Last Written Prescription Date:  2/10/21  Last Fill Quantity: 90,   # refills: 0  Last Office Visit: 2/16/21  Future Office visit:       Routing refill request to provider for review/approval because:  Drug not on the [facility], P or [facility] refill protocol or controlled substance

## 2021-04-14 ENCOUNTER — OFFICE VISIT (OUTPATIENT)
Dept: CHIROPRACTIC MEDICINE | Facility: OTHER | Age: 86
End: 2021-04-14
Attending: CHIROPRACTOR
Payer: OTHER MISCELLANEOUS

## 2021-04-14 DIAGNOSIS — M54.50 ACUTE BILATERAL LOW BACK PAIN WITHOUT SCIATICA: ICD-10-CM

## 2021-04-14 DIAGNOSIS — M99.03 SEGMENTAL AND SOMATIC DYSFUNCTION OF LUMBAR REGION: Primary | ICD-10-CM

## 2021-04-14 DIAGNOSIS — M99.02 SEGMENTAL AND SOMATIC DYSFUNCTION OF THORACIC REGION: ICD-10-CM

## 2021-04-14 PROCEDURE — 98940 CHIROPRACT MANJ 1-2 REGIONS: CPT | Mod: AT | Performed by: CHIROPRACTOR

## 2021-04-15 NOTE — PROGRESS NOTES
Subjective Finding:    Chief compalint: Patient presents with:  Back Pain  , Pain Scale: 5/10, Intensity: dull and ache, Duration: 3 weeks, Change since last visit: , Radiating: bilateral buttock.    Date of injury:     Activities that the pain restricts:   Home/household/hobbies/social activities: yes.  Work duties: yes.  Sleep: no.  Makes symptoms better: rest.  Makes symptoms worse: activity, lumbar extension and lumbar flexion.  Have you seen anyone else for the symptoms? no.  Work related: no  Automobile related injury: no.    Objective and Assessment:    Posture Analysis:   High shoulder: right.  Head tilt: right.  High iliac crest: right.  Head carriage: neutral.  Thoracic Kyphosis: neutral.  Lumbar Lordosis: forward.    Lumbar Range of Motion: flexion decreased, extension decreased, left lateral flexion decreased and right lateral flexion decreased.  Cervical Range of Motion: .  Thoracic Range of Motion: .  Extremity Range of Motion: .    Palpation:   Quad lumb: bilateral, referred pain: no     Segmental dysfunction pre-treatment: T10  L4-5  SI.  C7    Assessment post-treatment:  Cervical: ROM increased.  Thoracic: ROM increased.  Lumbar: ROM increased, pain and tenderness decreased and muscle spasm decreased.    Comments: .      Complicating Factors: .    Plan / Procedure:    Expected release date: .  Treatment plan: 1 times per month.  Instructed patient: ice 20 minutes every other hour as needed and rest.  Short term goals: reduce pain.  Long term goals: restore normal function.  Prognosis: excellent.

## 2021-05-04 DIAGNOSIS — M54.41 BILATERAL LOW BACK PAIN WITH RIGHT-SIDED SCIATICA, UNSPECIFIED CHRONICITY: ICD-10-CM

## 2021-05-05 RX ORDER — TRAMADOL HYDROCHLORIDE 50 MG/1
TABLET ORAL
Qty: 90 TABLET | Refills: 0 | Status: SHIPPED | OUTPATIENT
Start: 2021-05-05 | End: 2021-06-17

## 2021-05-05 NOTE — TELEPHONE ENCOUNTER
TRAMADOL 50MG TABLETS       Last Written Prescription Date:  3/25/21  Last Fill Quantity: 90,   # refills: 0  Last Office Visit: 2/16/21  Future Office visit:    Next 5 appointments (look out 90 days)    May 18, 2021 10:45 AM  (Arrive by 10:30 AM)  Office Visit with Eric Bernal MD  [facility] ([facility] ) [address]  [facility] [address]  [phone]   May 18, 2021 11:15 AM  (Arrive by 11:00 AM)  Office Visit with Eric Bernal MD  [facility] ([facility] ) [address]  [facility] [address]  [phone]           Routing refill request to provider for review/approval because:    Drug not on the [facility], [facility] or [facility] refill protocol or controlled substance

## 2021-05-11 NOTE — PROGRESS NOTES
"    Assessment & Plan     Type 2 diabetes mellitus without complication, without long-term current use of insulin (H)  [facility]  He is going to mail us the record.  I didn't repeat it today.   Update.  Lengthy review of the protein in the urine.  He is not tolerating the lisinopril.  We held it and will get urine protein and f/u in 3 months where I will get BMP and the urine protein.    - Basic metabolic panel  - Hemoglobin A1c    Gastroesophageal reflux disease without esophagitis  Helpful.  Doing well.    - famotidine (PEPCID) 40 MG tablet; Take 1 tablet (40 mg) by mouth nightly as needed    Fungal toenail infection  Reviewed.  Vicks.  Excellent pulse and good cap refill so I do not suspect any kind.          25 minutes spent on the date of the encounter doing documentation and patient visit and chart review       BMI:   Estimated body mass index is 29.91 kg/m  as calculated from the following:    Height as of 2/16/21: 1.702 m (5' 7\").    Weight as of this encounter: 86.6 kg (191 lb).           No follow-ups on file.    Eric Bernal MD  [facility]    Tyoin Walker is a 86 year old who presents for the following health issues     HPI     Diabetes Follow-up      How often are you checking your blood sugar? Not at all    What concerns do you have today about your diabetes? None     Do you have any of these symptoms? (Select all that apply)  Numbness in feet, toenails have stopped growing      BP Readings from Last 2 Encounters:   05/18/21 136/60   02/16/21 122/60     Hemoglobin A1C (%)   Date Value   02/16/2021 6.1 (H)   11/12/2020 6.4 (H)     LDL Cholesterol Calculated (mg/dL)   Date Value   11/12/2020 87   11/01/2019 112 (H)               Hypertension Follow-up      Do you check your blood pressure regularly outside of the clinic? Yes     Are you following a low salt diet? Yes    Are your blood pressures ever more than 140 on the top number (systolic) OR more   than " 90 on the bottom number (diastolic), for example 140/90? No          Review of Systems   Constitutional, HEENT, cardiovascular, pulmonary, gi and gu systems are negative, except as otherwise noted.      Objective    /60   Pulse 106   Temp 97.2  F (36.2  C)   Wt 86.6 kg (191 lb)   SpO2 95%   BMI 29.91 kg/m    Body mass index is 29.91 kg/m .  Physical Exam   GENERAL: healthy, alert and no distress  NECK: no adenopathy, no asymmetry, masses, or scars and thyroid normal to palpation  RESP: lungs clear to auscultation - no rales, rhonchi or wheezes  CV: regular rate and rhythm, normal S1 S2, no S3 or S4, no murmur, click or rub, no peripheral edema and peripheral pulses strong  ABDOMEN: soft, nontender, no hepatosplenomegaly, no masses and bowel sounds normal  MS: good pulse and abnormal great toe nail with likely fungus the right foot.  Left reportedly similar.  I marked his pulse spot and showed him the cap refill less than 1 second.      [facility] will [facility].

## 2021-05-11 NOTE — PROGRESS NOTES
Occupational Visit     SUBJECTIVE:  Kenyon Rivera, 86 year old, male is seen for follow up of occupational injury. Date of injury is 5/31/1975.    Linked Episodes   Type: Episode: Status: Noted: Resolved: Last update: Updated by:   WORK COMP [employer] Active 5/31/1975 5/11/2021  9:43 AM Orly Domingo LPN      Comments:       Musculoskeletal problem/pain      Duration: 5/31/1975    Description  Location: low back     Intensity:  moderate    Accompanying signs and symptoms: radiation of pain to right leg    History  Previous similar problem: YES  Previous evaluation:  x-ray and MRI    Precipitating or alleviating factors:  Trauma or overuse: YES- work injury  Aggravating factors include: none    Therapies tried and outcome: acetaminophen and Ibuprofen        Allergies   Allergen Reactions     Chlorzoxazone      Parafon Forte         Review of Systems:  5 point ROS negative except as noted above in HPI, including Gen., Resp., CV, GI &  system review.      OBJECTIVE:  Vitals:    05/18/21 1028   BP: 136/60   Pulse: 106   Temp: 97.2  F (36.2  C)   SpO2: 95%               Exam:  Obvious pain with turning the torso.  He walks with a limp.  He is very clear on what his needs are and his mental function is very clear.        Labs: No results found for this or any previous visit (from the past 24 hour(s)).      ASSESSMENT/PLAN:  (M54.41) Bilateral low back pain with right-sided sciatica, unspecified chronicity  Comment: reviewed.    Plan: Miscellaneous Order for DME - ONLY FOR DME,         cyclobenzaprine (FLEXERIL) 10 MG tablet        Update back brace and his flexeril.  Ongoing pain is stable overall.  He uses the tramadol appropriately and without side effects.

## 2021-05-18 ENCOUNTER — OFFICE VISIT (OUTPATIENT)
Dept: FAMILY MEDICINE | Facility: OTHER | Age: 86
End: 2021-05-18
Attending: FAMILY MEDICINE
Payer: OTHER MISCELLANEOUS

## 2021-05-18 ENCOUNTER — OFFICE VISIT (OUTPATIENT)
Dept: FAMILY MEDICINE | Facility: OTHER | Age: 86
End: 2021-05-18
Attending: FAMILY MEDICINE
Payer: COMMERCIAL

## 2021-05-18 VITALS
SYSTOLIC BLOOD PRESSURE: 136 MMHG | TEMPERATURE: 97.2 F | HEART RATE: 106 BPM | OXYGEN SATURATION: 95 % | WEIGHT: 191 LBS | BODY MASS INDEX: 29.91 KG/M2 | DIASTOLIC BLOOD PRESSURE: 60 MMHG

## 2021-05-18 VITALS
WEIGHT: 191 LBS | SYSTOLIC BLOOD PRESSURE: 136 MMHG | HEART RATE: 106 BPM | OXYGEN SATURATION: 95 % | BODY MASS INDEX: 29.91 KG/M2 | DIASTOLIC BLOOD PRESSURE: 60 MMHG | TEMPERATURE: 97.2 F

## 2021-05-18 DIAGNOSIS — M54.41 BILATERAL LOW BACK PAIN WITH RIGHT-SIDED SCIATICA, UNSPECIFIED CHRONICITY: ICD-10-CM

## 2021-05-18 DIAGNOSIS — B35.1 FUNGAL TOENAIL INFECTION: ICD-10-CM

## 2021-05-18 DIAGNOSIS — E11.9 TYPE 2 DIABETES MELLITUS WITHOUT COMPLICATION, WITHOUT LONG-TERM CURRENT USE OF INSULIN (H): Primary | ICD-10-CM

## 2021-05-18 DIAGNOSIS — K21.9 GASTROESOPHAGEAL REFLUX DISEASE WITHOUT ESOPHAGITIS: ICD-10-CM

## 2021-05-18 PROCEDURE — 99213 OFFICE O/P EST LOW 20 MIN: CPT | Performed by: FAMILY MEDICINE

## 2021-05-18 PROCEDURE — 99214 OFFICE O/P EST MOD 30 MIN: CPT | Performed by: FAMILY MEDICINE

## 2021-05-18 PROCEDURE — G0463 HOSPITAL OUTPT CLINIC VISIT: HCPCS

## 2021-05-18 RX ORDER — FAMOTIDINE 40 MG/1
40 TABLET, FILM COATED ORAL
Qty: 90 TABLET | Refills: 3 | Status: SHIPPED | OUTPATIENT
Start: 2021-05-18 | End: 2022-10-04 | Stop reason: ALTCHOICE

## 2021-05-18 RX ORDER — CYCLOBENZAPRINE HCL 10 MG
10 TABLET ORAL 2 TIMES DAILY
Qty: 180 TABLET | Refills: 3 | Status: SHIPPED | OUTPATIENT
Start: 2021-05-18 | End: 2022-03-08

## 2021-05-18 RX ORDER — SENNOSIDES A AND B 8.6 MG/1
1 TABLET, FILM COATED ORAL DAILY
COMMUNITY

## 2021-05-18 ASSESSMENT — PAIN SCALES - GENERAL
PAINLEVEL: MILD PAIN (2)
PAINLEVEL: MILD PAIN (2)

## 2021-05-18 NOTE — NURSING NOTE
"Chief Complaint   Patient presents with     Work Comp       Initial /60   Pulse 106   Temp 97.2  F (36.2  C)   Wt 86.6 kg (191 lb)   SpO2 95%   BMI 29.91 kg/m   Estimated body mass index is 29.91 kg/m  as calculated from the following:    Height as of 2/16/21: 1.702 m (5' 7\").    Weight as of this encounter: 86.6 kg (191 lb).  Medication Reconciliation: complete  Gladis Hernandez LPN  "

## 2021-05-18 NOTE — NURSING NOTE
"Chief Complaint   Patient presents with     Diabetes       Initial /60   Pulse 106   Temp 97.2  F (36.2  C)   Wt 86.6 kg (191 lb)   SpO2 95%   BMI 29.91 kg/m   Estimated body mass index is 29.91 kg/m  as calculated from the following:    Height as of 2/16/21: 1.702 m (5' 7\").    Weight as of this encounter: 86.6 kg (191 lb).  Medication Reconciliation: complete  Gladis Hernandez LPN  "

## 2021-06-17 DIAGNOSIS — M54.41 BILATERAL LOW BACK PAIN WITH RIGHT-SIDED SCIATICA, UNSPECIFIED CHRONICITY: ICD-10-CM

## 2021-06-17 RX ORDER — TRAMADOL HYDROCHLORIDE 50 MG/1
TABLET ORAL
Qty: 90 TABLET | Refills: 0 | Status: SHIPPED | OUTPATIENT
Start: 2021-06-17 | End: 2021-07-19

## 2021-06-17 NOTE — TELEPHONE ENCOUNTER
TRAMADOL 50MG TABLETS  Last Written Prescription Date:  5/5/21  Last Fill Quantity: 90,   # refills: 0  Last Office Visit: 5/18/21  Future Office visit:    Next 5 appointments (look out 90 days)    Aug 18, 2021 10:45 AM  (Arrive by 10:30 AM)  SHORT with Eric Bernal MD  [facility] ([facility] ) [address] [facility] [address]  [phone]           Routing refill request to provider for review/approval because:

## 2021-07-07 ENCOUNTER — OFFICE VISIT (OUTPATIENT)
Dept: PODIATRY | Facility: OTHER | Age: 86
End: 2021-07-07
Attending: PODIATRIST
Payer: MEDICARE

## 2021-07-07 VITALS
OXYGEN SATURATION: 96 % | DIASTOLIC BLOOD PRESSURE: 86 MMHG | BODY MASS INDEX: 29.95 KG/M2 | SYSTOLIC BLOOD PRESSURE: 162 MMHG | WEIGHT: 191.2 LBS | TEMPERATURE: 98.5 F | HEART RATE: 107 BPM

## 2021-07-07 DIAGNOSIS — L60.0 INGROWN TOENAIL: Primary | ICD-10-CM

## 2021-07-07 DIAGNOSIS — M79.674 GREAT TOE PAIN, RIGHT: ICD-10-CM

## 2021-07-07 DIAGNOSIS — F11.90 CHRONIC, CONTINUOUS USE OF OPIOIDS: ICD-10-CM

## 2021-07-07 DIAGNOSIS — L60.3 ONYCHODYSTROPHY: ICD-10-CM

## 2021-07-07 DIAGNOSIS — G60.3 IDIOPATHIC PROGRESSIVE POLYNEUROPATHY: ICD-10-CM

## 2021-07-07 PROCEDURE — G0463 HOSPITAL OUTPT CLINIC VISIT: HCPCS | Mod: 25

## 2021-07-07 PROCEDURE — 11750 EXCISION NAIL&NAIL MATRIX: CPT | Mod: T5 | Performed by: PODIATRIST

## 2021-07-07 PROCEDURE — 99203 OFFICE O/P NEW LOW 30 MIN: CPT | Mod: 25 | Performed by: PODIATRIST

## 2021-07-07 PROCEDURE — 87101 SKIN FUNGI CULTURE: CPT | Mod: ZL | Performed by: PODIATRIST

## 2021-07-07 ASSESSMENT — PAIN SCALES - GENERAL: PAINLEVEL: NO PAIN (0)

## 2021-07-07 NOTE — PROGRESS NOTES
Chief complaint: Patient presents with:  infected ingrown      History of Present Illness: This 87 year old borderline type II DM male is seen at the request of No ref. provider found for evaluation and suggestions of management of RIGHT great toe pain. He says the toenail has been painful for over a month. He has been concerned as well that the toenails on both of his feet aren't growing at all. He has not trimmed them in four months and they are not growing.    He says he has been applying Vicks Vapor Rub to the RIGHT great toenail in hopes that it would decrease the pain.    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 Because of the injury he had four back surgeries and he frequently has burning and tingling in the LEFT foot 4th and 5th digits.     No further pedal complaints today.       Last HbA1C was 6.1% on 02/16/2021.        BP (!) 162/86   Pulse 107   Temp 98.5  F (36.9  C)   Wt 86.7 kg (191 lb 3.2 oz)   SpO2 96%   BMI 29.95 kg/m      Patient Active Problem List   Diagnosis     Advanced care planning/counseling discussion     Hypertension, Benign     Hyperlipidemia     Insomnia     Lumbago     Hypertrophy of prostate without urinary obstruction     ACP (advance care planning)     Controlled substance agreement signed     Blood glucose elevated     Anxiety     Chronic, continuous use of opioids     Diabetes mellitus, type 2 (H)     Abdominal pain, generalized     Change in bowel habits       Past Surgical History:   Procedure Laterality Date     APPENDECTOMY       cataract extraction and lens implantation  3/2007    LEFT     CHOLECYSTECTOMY       electric stimulator implant       ENDOSCOPY UPPER, COLONOSCOPY, COMBINED N/A 12/5/2019    Procedure: upper endoscopy with biopsy, colonoscopy with biopsy;  Surgeon: Zachariah Bailey MD;  Location: [facility]     excision of Dupuytren's contracture extending into ring finger  02/08/2008    Bilateral     inguinal herniography       LEFT     NEPHRECTOMY  1992    RIGHT > Renal Cancer     OPEN REDUCTION INTERNAL FIXATION ANKLE      RIGHT     penile implant       TURP         Current Outpatient Medications   Medication     acetaminophen (TYLENOL) 500 MG tablet     aspirin 81 MG tablet     busPIRone (BUSPAR) 10 MG tablet     calcium carbonate (TUMS) 500 MG chewable tablet     cetirizine (ZYRTEC) 10 MG tablet     Cholecalciferol (VITAMIN D3) 2000 UNITS CAPS     cyclobenzaprine (FLEXERIL) 10 MG tablet     famotidine (PEPCID) 40 MG tablet     fluticasone (FLONASE) 50 MCG/ACT nasal spray     guaiFENesin (MUCINEX) 600 MG 12 hr tablet     Omega-3 Fatty Acids (FISH OIL) 1200 MG CAPS     omeprazole 20 MG tablet     order for DME     order for DME     order for DME     senna (SENOKOT) 8.6 MG tablet     senna-docusate (SENOKOT-S;PERICOLACE) 8.6-50 MG per tablet     SIMETHICONE-80 PO     tamsulosin (FLOMAX) 0.4 MG 24 hr capsule     temazepam (RESTORIL) 30 MG capsule     traMADol (ULTRAM) 50 MG tablet     TRIAMCINOLONE ACETONIDE EX     No current facility-administered medications for this visit.           Allergies   Allergen Reactions     Chlorzoxazone      Parafon Betsy       Family History   Problem Relation Age of Onset     Diabetes Brother      C.A.D. Mother      Myocardial Infarction Mother      Heart Disease Mother         Heart Disease     Cancer Sister         Leukemia     Other - See Comments Other         Colitis       Social History     Socioeconomic History     Marital status:      Spouse name: None     Number of children: None     Years of education: None     Highest education level: None   Occupational History     Occupation: LOGGING     Employer: RETIRED   Social Needs     Financial resource strain: None     Food insecurity     Worry: None     Inability: None     Transportation needs     Medical: None     Non-medical: None   Tobacco Use     Smoking status: Former Smoker     Packs/day: 2.00     Years: 26.00     Pack years: 52.00      Types: Cigarettes     Start date: 1949     Quit date: 1975     Years since quittin.4     Smokeless tobacco: Never Used     Tobacco comment: Tried to Quit (YES)   Substance and Sexual Activity     Alcohol use: Yes     Comment: RARELY     Drug use: No     Sexual activity: Never   Lifestyle     Physical activity     Days per week: None     Minutes per session: None     Stress: None   Relationships     Social connections     Talks on phone: None     Gets together: None     Attends Yazidism service: None     Active member of club or organization: None     Attends meetings of clubs or organizations: None     Relationship status: None     Intimate partner violence     Fear of current or ex partner: None     Emotionally abused: None     Physically abused: None     Forced sexual activity: None   Other Topics Concern      Service Yes     Comment: [employer]     Blood Transfusions Yes     Comment: Permits if needed     Caffeine Concern Yes     Comment: COFFEE - 1 CUP DAILY     Occupational Exposure No     Hobby Hazards No     Sleep Concern Yes     Comment: temazepam     Stress Concern No     Weight Concern No     Special Diet No     Back Care No     Exercise Yes     Comment: GYM > 3-4 times/week (0-5 Hours)     Bike Helmet Not Asked     Seat Belt Yes     Self-Exams Not Asked     Parent/sibling w/ CABG, MI or angioplasty before 65F 55M? Yes     Comment: mother   Social History Narrative     None       ROS: 10 point ROS neg other than the symptoms noted above in the HPI.  EXAM  Constitutional: healthy, alert and no distress    Psychiatric: mentation appears normal and affect normal/bright    VASCULAR:  -Dorsalis pedis pulse +2/4 b/l  -Posterior tibial pulse +1/4 b/l  -Capillary refill time < 3 seconds to b/l hallux  -Hair growth scant but present to b/l anterior legs and ankles  NEURO:  -Light touch sensation intact to b/l plantar forefoot  -Positive for paresthesias in the LEFT lateral foot  DERM:  -Skin  temperature, texture and turgor WNL b/l  -Toenails elongated, thickened, dystrophic and discolored x 10  -Incurvation to the lateral border of the RIGHT hallux  ---No erythema and mild edema to the nail border  ---No open wounds and no drainage  ---No severe erythema, no ascending erythema, no calor, no purulence, no malodor, no other SOI.  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 Treatment options discussed with no educational barriers noted.  --Discussed nail procedure options and etiologies and treatments for ingrown toenails. Conservatively, patient could opt for a slant back today and keep monitoring the toe since there are no SOI. Discussed risks and benefits and healing course of a nail border avulsion vs. Matrixectomy including post procedure infection or a non-healing wound, both of which could lead to a life threatening infection or amputation of the foot or leg or a proximal amputation. Patient understands the risks and benefits and has decided to proceed with a matrixectomy of the RIGHT hallux lateral toenail border.    -Matrixectomy of right hallux Lateral border: Written and verbal consent obtained after reviewing risks and benefits of the procedure. Patient understands that although phenol is used in attempt to prevent regrowth of the ingrown toenail, the nail can still grow back. There is also a risk of post procedure infection. A severe foot infection could lead to a proximal foot or leg  "amputation or loss of life, so the patient is advised to return to podiatry or the ED immediately if the patient notices any SOI. The patient is in agreement with this plan and wishes to proceed with the procedure. A time-out was performed to identify the correct patient, limb, digit and procedure.    An alcohol prep pad was applied to  to the base of the right hallux. The digit was injected with 9 mL of 1:1 of 2% Lidocaine plain and 0.5% marcaine plain. Adequate local anesthesia was obtained. A ring tournicot was applied to the digit and a chloroprep was applied to the hallux. A freer was used to loosen the nail from the underlying nail bed. An English Anvil and a hemostat were then used to remove the lateral toenail border. A total of three applications of phenol were applied for 30 seconds per application. The digit was rinsed thoroughly with alcohol. The tournicot was removed from the toe and there was a prompt hyperemic response to the hallux. The wound was then dressed with an Silvadene, gauze and 1\" coban. The patient was educated on after procedure care including daily epsom salt soaks starting tomorrow followed by dressing of the toe with an antibiotic cream and a bandaid until the wound site on the toe stops draining (2-3 weeks). Provided education on how to look for signs of infection (redness, swelling, pain, purulence, fever, chills, nausea, vomiting) and the patient was instructed to return to the clinic or Emergency Department immediately if there are any signs of infection.    ----------------------------------  Nail growth and onychodystrophy:  Onychodystrophy  -Discussed etiologies and treatment options for onychodystrophy. There may be fungi in the toenail, but it is not known until a nail culture is performed. Treatment options consist of leaving the toenail as is, treating the nail for fungi (culture would be taken today to confirm nail fungi), a nail avulsion and treating the fungi as the nail " grows back in, or removing the toenail permanently. The oral medication can sometimes fluctuate liver values so the patient would need to get a liver blood draw before, during and after the 90 day treatment. Topical anti-fungals can be used, but they do not always full cure the toenail fungi and they must be used daily and sometimes for 6-12 months before noticing a difference in the toenail. Recurrence rate of toenail fungi is high. After reviewing risks and benefits, patient has decided to proceed with Vicks Vapor Rub if the culture is positive.      -Patient in agreement with the above treatment plan and all of patient's questions were answered.      RTC two weeks to evaluate healing of RIGHT hallux lateral toenail border matrixectomy (performed on 07/07/2021)        Mayi Mo DPM

## 2021-07-07 NOTE — PATIENT INSTRUCTIONS
Nail procedure care:  -Start epsom salt soaks tomorrow or soak your foot in warm water with a Yola Dish Soap (not hot water, just lukewarm water). Soak the foot 1-2 times a day for 20 minutes.  -Apply an antibiotic cream, gauze and a bandaid over the toe until your follow-up appointment.  -Let the skin air out for a few hours each evening.    -Do not apply a band aid directly over the nail procedure site without gauze.    -You may develop a black scab over the nail bed--let this fall off on its own and don't pick at it.  -The toe may drain for 2-3 weeks. It is normal for it to have a clear drainage.    Watching for signs of infection:  If the toe has a thick, white pus coming from the procedure site or if the the toe becomes red, swollen, painful, or you begin to feel sick (fever/chills/nausea/vomitting), return to the podiatry clinic immediately or to the emergency room if after hours.

## 2021-07-07 NOTE — NURSING NOTE
"Chief Complaint   Patient presents with     infected ingrown       Initial BP (!) 162/86   Pulse 107   Temp 98.5  F (36.9  C)   Wt 86.7 kg (191 lb 3.2 oz)   SpO2 96%   BMI 29.95 kg/m   Estimated body mass index is 29.95 kg/m  as calculated from the following:    Height as of 2/16/21: 1.702 m (5' 7\").    Weight as of this encounter: 86.7 kg (191 lb 3.2 oz).  Medication Reconciliation: complete  Brittany Barger LPN    "
Clothing

## 2021-07-19 DIAGNOSIS — M54.41 BILATERAL LOW BACK PAIN WITH RIGHT-SIDED SCIATICA, UNSPECIFIED CHRONICITY: ICD-10-CM

## 2021-07-19 RX ORDER — TRAMADOL HYDROCHLORIDE 50 MG/1
TABLET ORAL
Qty: 90 TABLET | Refills: 0 | Status: SHIPPED | OUTPATIENT
Start: 2021-07-19 | End: 2021-09-14

## 2021-07-19 NOTE — TELEPHONE ENCOUNTER
traMADol (ULTRAM) 50 MG tablet      Last Written Prescription Date:  6/17/21  Last Fill Quantity: 90,   # refills: 0  Last Office Visit: 5/18/21  Future Office visit:    Next 5 appointments (look out 90 days)    Aug 18, 2021 10:45 AM  (Arrive by 10:30 AM)  [facility] with Eric Bernal MD  [facility] [address]  [facility] [place] [address]  [phone]           Routing refill request to provider for review/approval

## 2021-07-21 ENCOUNTER — OFFICE VISIT (OUTPATIENT)
Dept: PODIATRY | Facility: OTHER | Age: 86
End: 2021-07-21
Attending: PODIATRIST
Payer: MEDICARE

## 2021-07-21 VITALS
BODY MASS INDEX: 30.23 KG/M2 | SYSTOLIC BLOOD PRESSURE: 154 MMHG | RESPIRATION RATE: 16 BRPM | HEART RATE: 101 BPM | WEIGHT: 193 LBS | TEMPERATURE: 98 F | DIASTOLIC BLOOD PRESSURE: 72 MMHG | OXYGEN SATURATION: 90 %

## 2021-07-21 DIAGNOSIS — F11.90 CHRONIC, CONTINUOUS USE OF OPIOIDS: ICD-10-CM

## 2021-07-21 DIAGNOSIS — L60.3 ONYCHODYSTROPHY: ICD-10-CM

## 2021-07-21 DIAGNOSIS — L97.512 DIABETIC ULCER OF TOE OF RIGHT FOOT ASSOCIATED WITH TYPE 2 DIABETES MELLITUS, WITH FAT LAYER EXPOSED (H): Primary | ICD-10-CM

## 2021-07-21 DIAGNOSIS — M79.674 GREAT TOE PAIN, RIGHT: ICD-10-CM

## 2021-07-21 DIAGNOSIS — E11.621 DIABETIC ULCER OF TOE OF RIGHT FOOT ASSOCIATED WITH TYPE 2 DIABETES MELLITUS, WITH FAT LAYER EXPOSED (H): Primary | ICD-10-CM

## 2021-07-21 DIAGNOSIS — G60.3 IDIOPATHIC PROGRESSIVE POLYNEUROPATHY: ICD-10-CM

## 2021-07-21 PROCEDURE — 99212 OFFICE O/P EST SF 10 MIN: CPT | Performed by: PODIATRIST

## 2021-07-21 PROCEDURE — G0463 HOSPITAL OUTPT CLINIC VISIT: HCPCS | Mod: 25

## 2021-07-21 ASSESSMENT — PAIN SCALES - GENERAL: PAINLEVEL: NO PAIN (0)

## 2021-07-21 NOTE — PATIENT INSTRUCTIONS
Thank you for allowing  and our Podiatry team to participate in your care. Please call our office at [phone] with scheduling questions or with any other questions or concerns.

## 2021-07-21 NOTE — PROGRESS NOTES
Chief complaint: Patient presents with:  Ingrown Toenail      History of Present Illness: This 87 year old borderline type II DM male is seen for follow-up management of a RIGHT hallux lateral toenail border matrixectomy.    Patient has been soaking the foot in Yola Dish Soap soaks every day. Patient has applied an antibiotic ointment dressing to the toe every day. The ingrown procedure site has caused little pain and the overall toe pain is much improved compared to the pain from the ingrown toenail.     No further pedal complaints today.       Last HbA1C was 6.1% on 02/16/2021.        BP (!) 154/72 (BP Location: Right arm, Patient Position: Sitting, Cuff Size: Adult Regular)   Pulse 101   Temp 98  F (36.7  C) (Tympanic)   Resp 16   Wt 87.5 kg (193 lb)   SpO2 90%   BMI 30.23 kg/m      Patient Active Problem List   Diagnosis     Advanced care planning/counseling discussion     Hypertension, Benign     Hyperlipidemia     Insomnia     Lumbago     Hypertrophy of prostate without urinary obstruction     ACP (advance care planning)     Controlled substance agreement signed     Blood glucose elevated     Anxiety     Chronic, continuous use of opioids     Diabetes mellitus, type 2 (H)     Abdominal pain, generalized     Change in bowel habits       Past Surgical History:   Procedure Laterality Date     APPENDECTOMY       cataract extraction and lens implantation  3/2007    LEFT     CHOLECYSTECTOMY       electric stimulator implant       ENDOSCOPY UPPER, COLONOSCOPY, COMBINED N/A 12/5/2019    Procedure: upper endoscopy with biopsy, colonoscopy with biopsy;  Surgeon: Zachariah Bailey MD;  Location: [facility]     excision of Dupuytren's contracture extending into ring finger  02/08/2008    Bilateral     inguinal herniography      LEFT     NEPHRECTOMY  1992    RIGHT > Renal Cancer     OPEN REDUCTION INTERNAL FIXATION ANKLE      RIGHT     penile implant       TURP         Current Outpatient Medications   Medication      acetaminophen (TYLENOL) 500 MG tablet     aspirin 81 MG tablet     busPIRone (BUSPAR) 10 MG tablet     calcium carbonate (TUMS) 500 MG chewable tablet     cetirizine (ZYRTEC) 10 MG tablet     Cholecalciferol (VITAMIN D3) 2000 UNITS CAPS     cyclobenzaprine (FLEXERIL) 10 MG tablet     famotidine (PEPCID) 40 MG tablet     fluticasone (FLONASE) 50 MCG/ACT nasal spray     guaiFENesin (MUCINEX) 600 MG 12 hr tablet     Omega-3 Fatty Acids (FISH OIL) 1200 MG CAPS     omeprazole 20 MG tablet     order for DME     order for DME     order for DME     senna (SENOKOT) 8.6 MG tablet     senna-docusate (SENOKOT-S;PERICOLACE) 8.6-50 MG per tablet     SIMETHICONE-80 PO     tamsulosin (FLOMAX) 0.4 MG 24 hr capsule     temazepam (RESTORIL) 30 MG capsule     traMADol (ULTRAM) 50 MG tablet     TRIAMCINOLONE ACETONIDE EX     No current facility-administered medications for this visit.          Allergies   Allergen Reactions     Chlorzoxazone      Parafocary Meyer       Family History   Problem Relation Age of Onset     Diabetes Brother      C.A.D. Mother      Myocardial Infarction Mother      Heart Disease Mother         Heart Disease     Cancer Sister         Leukemia     Other - See Comments Other         Colitis       Social History     Socioeconomic History     Marital status:      Spouse name: None     Number of children: None     Years of education: None     Highest education level: None   Occupational History     Occupation: LOGGING     Employer: RETIRED   Social Needs     Financial resource strain: None     Food insecurity     Worry: None     Inability: None     Transportation needs     Medical: None     Non-medical: None   Tobacco Use     Smoking status: Former Smoker     Packs/day: 2.00     Years: 26.00     Pack years: 52.00     Types: Cigarettes     Start date: 1949     Quit date: 1975     Years since quittin.4     Smokeless tobacco: Never Used     Tobacco comment: Tried to Quit (YES)   Substance and Sexual  Activity     Alcohol use: Yes     Comment: RARELY     Drug use: No     Sexual activity: Never   Lifestyle     Physical activity     Days per week: None     Minutes per session: None     Stress: None   Relationships     Social connections     Talks on phone: None     Gets together: None     Attends Yarsani service: None     Active member of club or organization: None     Attends meetings of clubs or organizations: None     Relationship status: None     Intimate partner violence     Fear of current or ex partner: None     Emotionally abused: None     Physically abused: None     Forced sexual activity: None   Other Topics Concern      Service Yes     Comment: [employer]     Blood Transfusions Yes     Comment: Permits if needed     Caffeine Concern Yes     Comment: COFFEE - 1 CUP DAILY     Occupational Exposure No     Hobby Hazards No     Sleep Concern Yes     Comment: temazepam     Stress Concern No     Weight Concern No     Special Diet No     Back Care No     Exercise Yes     Comment: GYM > 3-4 times/week (0-5 Hours)     Bike Helmet Not Asked     Seat Belt Yes     Self-Exams Not Asked     Parent/sibling w/ CABG, MI or angioplasty before 65F 55M? Yes     Comment: mother   Social History Narrative     None       ROS: 10 point ROS neg other than the symptoms noted above in the HPI.  EXAM  Constitutional: healthy, alert and no distress    Psychiatric: mentation appears normal and affect normal/bright    RIGHT FOOT FOCUSED    VASCULAR:  -Dorsalis pedis pulse +2/4   -Posterior tibial pulse +1/4   -Capillary refill time < 3 seconds to hallux  -Hair growth scant but present to anterior leg and ankle  NEURO:  -Light touch sensation intact to plantar forefoot  -Positive for paresthesias in the LEFT lateral foot (LEFT foot not evaluated today)  DERM:  -Skin temperature, texture and turgor WNL b/l  -Toenails elongated, thickened, dystrophic and discolored x 5  -Matrixectomy site on the lateral border of the RIGHT  hallux  ---No erythema and no edema to the nail border  ---Border has opening through subcutaneous tissue with mild serous drainage  ---No severe erythema, no ascending erythema, no calor, no purulence, no malodor, no other SOI.  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 Treatment options discussed with no educational barriers noted.  -The matrixectomy site appears clean and healthy but is not healed.    -Dressed RIGHT hallux digits with betadine gauze, dry gauze and tape  -Continue Yola Dish Soap soaks every other day (no longer needs to be daily)  -Patient instructed to continue with above dressings until there is no further drainage and wound is healed  -Additional dressing supplies were dispensed to the patient  -Patient instructed to continue to look for SOI (redness, swelling, pain, purulence, fever, chills, nausea, vomiting) and to return to podiatry or the emergency department immediately if there are any SOI.  ---Matrixectomy sites have no SOI today     ----------------------------------  -Waiting on toenail fungal culture results (sent on 07/07/2021)    -Patient in agreement with the above treatment plan and all of patient's questions were answered.      RTC two weeks to evaluate healing of RIGHT hallux lateral toenail border matrixectomy (performed on 07/07/2021)        Mayi GODINEZ  JUANCARLOS Mo

## 2021-07-21 NOTE — NURSING NOTE
"Chief Complaint   Patient presents with     Ingrown Toenail       Initial BP (!) 154/72 (BP Location: Right arm, Patient Position: Sitting, Cuff Size: Adult Regular)   Pulse 101   Temp 98  F (36.7  C) (Tympanic)   Resp 16   Wt 87.5 kg (193 lb)   SpO2 90%   BMI 30.23 kg/m   Estimated body mass index is 30.23 kg/m  as calculated from the following:    Height as of 2/16/21: 1.702 m (5' 7\").    Weight as of this encounter: 87.5 kg (193 lb).  Medication Reconciliation: complete  Randi Meehan LPN  "

## 2021-07-26 NOTE — PROGRESS NOTES
"    Assessment & Plan     Type 2 diabetes mellitus without complication, without long-term current use of insulin (H)  Doing well.  Doesn't check sugars.  Update and follow.  Has been eating more lately with sweets, etc.    - Basic metabolic panel; Future  - Hemoglobin A1c; Future    Hypertension, Benign  Stable.  Doing well.                 BMI:   Estimated body mass index is 30.54 kg/m  as calculated from the following:    Height as of 2/16/21: 1.702 m (5' 7\").    Weight as of this encounter: 88.5 kg (195 lb).           No follow-ups on file.    Eric Bernal MD  [facility] [place]    Toyin Walker is a 87 year old who presents for the following health issues     HPI     Diabetes Follow-up      How often are you checking your blood sugar? Not at all    What concerns do you have today about your diabetes? None     Do you have any of these symptoms? (Select all that apply)  Numbness in feet    Have you had a diabetic eye exam in the last 12 months? No-appt next month        BP Readings from Last 2 Encounters:   08/18/21 114/62   08/04/21 (!) 162/72     Hemoglobin A1C (%)   Date Value   02/16/2021 6.1 (H)   11/12/2020 6.4 (H)     LDL Cholesterol Calculated (mg/dL)   Date Value   11/12/2020 87   11/01/2019 112 (H)               Hypertension Follow-up      Do you check your blood pressure regularly outside of the clinic? Yes     Are you following a low salt diet? Yes    Are your blood pressures ever more than 140 on the top number (systolic) OR more   than 90 on the bottom number (diastolic), for example 140/90? No          Review of Systems   Constitutional, HEENT, cardiovascular, pulmonary, gi and gu systems are negative, except as otherwise noted.      Objective    /62   Pulse 92   Temp 97.1  F (36.2  C)   Wt 88.5 kg (195 lb)   SpO2 96%   BMI 30.54 kg/m    Body mass index is 30.54 kg/m .  Physical Exam   GENERAL: healthy, alert and no distress  NECK: no adenopathy, no asymmetry, " masses, or scars and thyroid normal to palpation  RESP: lungs clear to auscultation - no rales, rhonchi or wheezes  CV: regular rate and rhythm, normal S1 S2, no S3 or S4, no murmur, click or rub, no peripheral edema and peripheral pulses strong  ABDOMEN: soft, nontender, no hepatosplenomegaly, no masses and bowel sounds normal  MS: no gross musculoskeletal defects noted, no edema

## 2021-08-02 ENCOUNTER — TELEPHONE (OUTPATIENT)
Dept: FAMILY MEDICINE | Facility: OTHER | Age: 86
End: 2021-08-02

## 2021-08-02 DIAGNOSIS — R06.02 SOB (SHORTNESS OF BREATH): Primary | ICD-10-CM

## 2021-08-02 RX ORDER — ALBUTEROL SULFATE 90 UG/1
2 AEROSOL, METERED RESPIRATORY (INHALATION) EVERY 6 HOURS
Qty: 18 G | Refills: 3 | Status: SHIPPED | OUTPATIENT
Start: 2021-08-02

## 2021-08-02 RX ORDER — ALBUTEROL SULFATE 90 UG/1
2 AEROSOL, METERED RESPIRATORY (INHALATION) EVERY 6 HOURS
Qty: 8.5 G | Refills: 0 | Status: SHIPPED | OUTPATIENT
Start: 2021-08-02 | End: 2021-08-18

## 2021-08-02 NOTE — TELEPHONE ENCOUNTER
Patient states is having a hard time catching his breath with the increase of smoke in the air.  Is requesting a prescription for Albuterol be sent to [facility] in [place].    Please advise and contact patient at [phone]

## 2021-08-04 ENCOUNTER — OFFICE VISIT (OUTPATIENT)
Dept: CHIROPRACTIC MEDICINE | Facility: OTHER | Age: 86
End: 2021-08-04
Attending: CHIROPRACTOR
Payer: OTHER MISCELLANEOUS

## 2021-08-04 ENCOUNTER — OFFICE VISIT (OUTPATIENT)
Dept: PODIATRY | Facility: OTHER | Age: 86
End: 2021-08-04
Attending: PODIATRIST
Payer: MEDICARE

## 2021-08-04 VITALS
BODY MASS INDEX: 30.42 KG/M2 | TEMPERATURE: 99 F | HEART RATE: 101 BPM | RESPIRATION RATE: 12 BRPM | WEIGHT: 194.2 LBS | DIASTOLIC BLOOD PRESSURE: 72 MMHG | SYSTOLIC BLOOD PRESSURE: 162 MMHG | OXYGEN SATURATION: 95 %

## 2021-08-04 DIAGNOSIS — F11.90 CHRONIC, CONTINUOUS USE OF OPIOIDS: ICD-10-CM

## 2021-08-04 DIAGNOSIS — M99.02 SEGMENTAL AND SOMATIC DYSFUNCTION OF THORACIC REGION: ICD-10-CM

## 2021-08-04 DIAGNOSIS — M99.03 SEGMENTAL AND SOMATIC DYSFUNCTION OF LUMBAR REGION: Primary | ICD-10-CM

## 2021-08-04 DIAGNOSIS — M54.50 ACUTE BILATERAL LOW BACK PAIN WITHOUT SCIATICA: ICD-10-CM

## 2021-08-04 DIAGNOSIS — L60.3 ONYCHODYSTROPHY: Primary | ICD-10-CM

## 2021-08-04 DIAGNOSIS — G60.3 IDIOPATHIC PROGRESSIVE POLYNEUROPATHY: ICD-10-CM

## 2021-08-04 PROCEDURE — 98940 CHIROPRACT MANJ 1-2 REGIONS: CPT | Mod: AT | Performed by: CHIROPRACTOR

## 2021-08-04 PROCEDURE — G0463 HOSPITAL OUTPT CLINIC VISIT: HCPCS

## 2021-08-04 PROCEDURE — 99212 OFFICE O/P EST SF 10 MIN: CPT | Performed by: PODIATRIST

## 2021-08-04 ASSESSMENT — PAIN SCALES - GENERAL: PAINLEVEL: NO PAIN (0)

## 2021-08-04 NOTE — PROGRESS NOTES
Chief complaint: Patient presents with:  RECHECK: matrixectomy      History of Present Illness: This 87 year old borderline type II DM male is seen for follow-up management of a RIGHT hallux lateral toenail border matrixectomy.    Patient has been soaking the foot in [employer] D[employer]ap soaks every day. Patient has applied betadine, gauze and tape to the wound daily. He has no pain from the toenail.    Patient says his 4th digit toenail split the day after his last podiatry appointment. It is not causing him any pain.    No further pedal complaints today.       Last HbA1C was 6.1% on 02/16/2021.        BP (!) 162/72 (BP Location: Right arm, Patient Position: Sitting, Cuff Size: Adult Regular)   Pulse 101   Temp 99  F (37.2  C) (Tympanic)   Resp 12   Wt 88.1 kg (194 lb 3.2 oz)   SpO2 95%   BMI 30.42 kg/m      Patient Active Problem List   Diagnosis     Advanced care planning/counseling discussion     Hypertension, Benign     Hyperlipidemia     Insomnia     Lumbago     Hypertrophy of prostate without urinary obstruction     ACP (advance care planning)     Controlled substance agreement signed     Blood glucose elevated     Anxiety     Chronic, continuous use of opioids     Diabetes mellitus, type 2 (H)     Abdominal pain, generalized     Change in bowel habits       Past Surgical History:   Procedure Laterality Date     APPENDECTOMY       cataract extraction and lens implantation  3/2007    LEFT     CHOLECYSTECTOMY       electric stimulator implant       ENDOSCOPY UPPER, COLONOSCOPY, COMBINED N/A 12/5/2019    Procedure: upper endoscopy with biopsy, colonoscopy with biopsy;  Surgeon: Zachariah Bailey MD;  Location: [facility]     excision of Dupuytren's contracture extending into ring finger  02/08/2008    Bilateral     inguinal herniography      LEFT     NEPHRECTOMY  1992    RIGHT > Renal Cancer     OPEN REDUCTION INTERNAL FIXATION ANKLE      RIGHT     penile implant       TURP         Current Outpatient Medications    Medication     acetaminophen (TYLENOL) 500 MG tablet     albuterol (PROAIR HFA/PROVENTIL HFA/VENTOLIN HFA) 108 (90 Base) MCG/ACT inhaler     albuterol (PROAIR HFA/PROVENTIL HFA/VENTOLIN HFA) 108 (90 Base) MCG/ACT inhaler     aspirin 81 MG tablet     busPIRone (BUSPAR) 10 MG tablet     calcium carbonate (TUMS) 500 MG chewable tablet     cetirizine (ZYRTEC) 10 MG tablet     Cholecalciferol (VITAMIN D3) 2000 UNITS CAPS     cyclobenzaprine (FLEXERIL) 10 MG tablet     famotidine (PEPCID) 40 MG tablet     fluticasone (FLONASE) 50 MCG/ACT nasal spray     guaiFENesin (MUCINEX) 600 MG 12 hr tablet     Omega-3 Fatty Acids (FISH OIL) 1200 MG CAPS     omeprazole 20 MG tablet     order for DME     order for DME     order for DME     senna (SENOKOT) 8.6 MG tablet     senna-docusate (SENOKOT-S;PERICOLACE) 8.6-50 MG per tablet     SIMETHICONE-80 PO     tamsulosin (FLOMAX) 0.4 MG 24 hr capsule     temazepam (RESTORIL) 30 MG capsule     traMADol (ULTRAM) 50 MG tablet     TRIAMCINOLONE ACETONIDE EX     No current facility-administered medications for this visit.          Allergies   Allergen Reactions     Chlorzoxazone      Mikhail Meyer       Family History   Problem Relation Age of Onset     Diabetes Brother      C.A.D. Mother      Myocardial Infarction Mother      Heart Disease Mother         Heart Disease     Cancer Sister         Leukemia     Other - See Comments Other         Colitis       Social History     Socioeconomic History     Marital status:      Spouse name: None     Number of children: None     Years of education: None     Highest education level: None   Occupational History     Occupation: LOGGING     Employer: RETIRED   Social Needs     Financial resource strain: None     Food insecurity     Worry: None     Inability: None     Transportation needs     Medical: None     Non-medical: None   Tobacco Use     Smoking status: Former Smoker     Packs/day: 2.00     Years: 26.00     Pack years: 52.00     Types:  Cigarettes     Start date: 1949     Quit date: 1975     Years since quittin.4     Smokeless tobacco: Never Used     Tobacco comment: Tried to Quit (YES)   Substance and Sexual Activity     Alcohol use: Yes     Comment: RARELY     Drug use: No     Sexual activity: Never   Lifestyle     Physical activity     Days per week: None     Minutes per session: None     Stress: None   Relationships     Social connections     Talks on phone: None     Gets together: None     Attends Synagogue service: None     Active member of club or organization: None     Attends meetings of clubs or organizations: None     Relationship status: None     Intimate partner violence     Fear of current or ex partner: None     Emotionally abused: None     Physically abused: None     Forced sexual activity: None   Other Topics Concern      Service Yes     Comment: [employer]     Blood Transfusions Yes     Comment: Permits if needed     Caffeine Concern Yes     Comment: COFFEE - 1 CUP DAILY     Occupational Exposure No     Hobby Hazards No     Sleep Concern Yes     Comment: temazepam     Stress Concern No     Weight Concern No     Special Diet No     Back Care No     Exercise Yes     Comment: GYM > 3-4 times/week (0-5 Hours)     Bike Helmet Not Asked     Seat Belt Yes     Self-Exams Not Asked     Parent/sibling w/ CABG, MI or angioplasty before 65F 55M? Yes     Comment: mother   Social History Narrative     None       ROS: 10 point ROS neg other than the symptoms noted above in the HPI.  EXAM  Constitutional: healthy, alert and no distress    Psychiatric: mentation appears normal and affect normal/bright    RIGHT FOOT FOCUSED    VASCULAR:  -Dorsalis pedis pulse +2/4   -Posterior tibial pulse +1/4   -Capillary refill time < 3 seconds to hallux  -Hair growth scant but present to anterior leg and ankle  NEURO:  -Light touch sensation intact to plantar forefoot  -Positive for paresthesias in the LEFT lateral foot (LEFT foot not evaluated  today)  DERM:  -Skin temperature, texture and turgor WNL b/l  -Toenails elongated, thickened, dystrophic and discolored x 5  ---Mild longitudinal [id number]-[id number] split of the RIGHT 4th digit toenail without open wounds and with minimal sharp edges  -Matrixectomy site on the lateral border of the RIGHT hallux  ---No erythema and no edema to the nail border  ---Border has opening through subcutaneous tissue with mild serous drainage  ---No severe erythema, no ascending erythema, no calor, no purulence, no malodor, no other SOI.  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 Treatment options discussed with no educational barriers noted.  -The matrixectomy site is healed with no open wounds.  ---No severe erythema, no ascending erythema, no calor, no purulence, no malodor, no other SOI.     -The patient may discontinue dressing changes and soaks    -Minimally filed the sharp edge on the RIGHT 4th digit split toenail -- no open wounds or concerns at this time with the toenail. The split may continue with the new nail growth or the new nail may not have a split.  -Still waiting on fungal nail culture results (obtained on 07/07/2021)    -Patient in agreement with the above treatment plan and all of patient's questions were answered.      RTC as needed      Mayi Mo DPM

## 2021-08-04 NOTE — NURSING NOTE
"Chief Complaint   Patient presents with     RECHECK     matrixectomy       Initial BP (!) 162/72 (BP Location: Right arm, Patient Position: Sitting, Cuff Size: Adult Regular)   Pulse 101   Temp 99  F (37.2  C) (Tympanic)   Resp 12   Wt 88.1 kg (194 lb 3.2 oz)   SpO2 95%   BMI 30.42 kg/m   Estimated body mass index is 30.42 kg/m  as calculated from the following:    Height as of 2/16/21: 1.702 m (5' 7\").    Weight as of this encounter: 88.1 kg (194 lb 3.2 oz).  Medication Reconciliation: complete  Randi Meehan LPN  "

## 2021-08-04 NOTE — PATIENT INSTRUCTIONS
Thank you for allowing  and our Podiatry team to participate in your care. Please call our office at [phone] with scheduling questions or with any other questions or concerns.

## 2021-08-05 LAB
BACTERIA SPEC CULT: NORMAL
SPECIMEN SOURCE: NORMAL

## 2021-08-18 ENCOUNTER — OFFICE VISIT (OUTPATIENT)
Dept: FAMILY MEDICINE | Facility: OTHER | Age: 86
End: 2021-08-18
Attending: FAMILY MEDICINE
Payer: MEDICARE

## 2021-08-18 VITALS
BODY MASS INDEX: 30.54 KG/M2 | DIASTOLIC BLOOD PRESSURE: 62 MMHG | WEIGHT: 195 LBS | SYSTOLIC BLOOD PRESSURE: 114 MMHG | HEART RATE: 92 BPM | OXYGEN SATURATION: 96 % | TEMPERATURE: 97.1 F

## 2021-08-18 DIAGNOSIS — E11.9 TYPE 2 DIABETES MELLITUS WITHOUT COMPLICATION, WITHOUT LONG-TERM CURRENT USE OF INSULIN (H): Primary | ICD-10-CM

## 2021-08-18 DIAGNOSIS — I10 ESSENTIAL HYPERTENSION, BENIGN: ICD-10-CM

## 2021-08-18 LAB
ANION GAP SERPL CALCULATED.3IONS-SCNC: 5 MMOL/L (ref 3–14)
BUN SERPL-MCNC: 30 MG/DL (ref 7–30)
CALCIUM SERPL-MCNC: 9 MG/DL (ref 8.5–10.1)
CHLORIDE BLD-SCNC: 106 MMOL/L (ref 94–109)
CO2 SERPL-SCNC: 28 MMOL/L (ref 20–32)
CREAT SERPL-MCNC: 1.55 MG/DL (ref 0.66–1.25)
EST. AVERAGE GLUCOSE BLD GHB EST-MCNC: 128 MG/DL
GFR SERPL CREATININE-BSD FRML MDRD: 40 ML/MIN/1.73M2
GLUCOSE BLD-MCNC: 111 MG/DL (ref 70–99)
HBA1C MFR BLD: 6.1 % (ref 0–5.6)
POTASSIUM BLD-SCNC: 4.7 MMOL/L (ref 3.4–5.3)
SODIUM SERPL-SCNC: 139 MMOL/L (ref 133–144)

## 2021-08-18 PROCEDURE — 99213 OFFICE O/P EST LOW 20 MIN: CPT | Performed by: FAMILY MEDICINE

## 2021-08-18 PROCEDURE — 80048 BASIC METABOLIC PNL TOTAL CA: CPT | Mod: ZL | Performed by: FAMILY MEDICINE

## 2021-08-18 PROCEDURE — 36415 COLL VENOUS BLD VENIPUNCTURE: CPT | Mod: ZL | Performed by: FAMILY MEDICINE

## 2021-08-18 PROCEDURE — G0463 HOSPITAL OUTPT CLINIC VISIT: HCPCS

## 2021-08-18 PROCEDURE — 83036 HEMOGLOBIN GLYCOSYLATED A1C: CPT | Mod: ZL | Performed by: FAMILY MEDICINE

## 2021-08-18 ASSESSMENT — PAIN SCALES - GENERAL: PAINLEVEL: MILD PAIN (2)

## 2021-08-18 NOTE — NURSING NOTE
"Chief Complaint   Patient presents with     Diabetes       Initial /62   Pulse 92   Temp 97.1  F (36.2  C)   Wt 88.5 kg (195 lb)   SpO2 96%   BMI 30.54 kg/m   Estimated body mass index is 30.54 kg/m  as calculated from the following:    Height as of 2/16/21: 1.702 m (5' 7\").    Weight as of this encounter: 88.5 kg (195 lb).  Medication Reconciliation: complete  Gladis Hernandez LPN  "

## 2021-09-12 DIAGNOSIS — M54.41 BILATERAL LOW BACK PAIN WITH RIGHT-SIDED SCIATICA, UNSPECIFIED CHRONICITY: ICD-10-CM

## 2021-09-14 RX ORDER — TRAMADOL HYDROCHLORIDE 50 MG/1
TABLET ORAL
Qty: 90 TABLET | Refills: 0 | Status: SHIPPED | OUTPATIENT
Start: 2021-09-14 | End: 2021-11-09

## 2021-09-14 NOTE — TELEPHONE ENCOUNTER
Ultram      Last Written Prescription Date:  7.19.21  Last Fill Quantity: #90,   # refills: 0  Last Office Visit: 8.18.21  Future Office visit:       Routing refill request to provider for review/approval because:  Drug not on the [facility], [facility] or [facility] refill protocol or controlled substance

## 2021-10-26 ENCOUNTER — OFFICE VISIT (OUTPATIENT)
Dept: CHIROPRACTIC MEDICINE | Facility: OTHER | Age: 86
End: 2021-10-26
Attending: CHIROPRACTOR
Payer: OTHER MISCELLANEOUS

## 2021-10-26 DIAGNOSIS — M54.50 ACUTE BILATERAL LOW BACK PAIN WITHOUT SCIATICA: ICD-10-CM

## 2021-10-26 DIAGNOSIS — M99.03 SEGMENTAL AND SOMATIC DYSFUNCTION OF LUMBAR REGION: Primary | ICD-10-CM

## 2021-10-26 DIAGNOSIS — M99.02 SEGMENTAL AND SOMATIC DYSFUNCTION OF THORACIC REGION: ICD-10-CM

## 2021-10-26 PROCEDURE — 98941 CHIROPRACT MANJ 3-4 REGIONS: CPT | Mod: AT | Performed by: CHIROPRACTOR

## 2021-11-08 DIAGNOSIS — M54.41 BILATERAL LOW BACK PAIN WITH RIGHT-SIDED SCIATICA, UNSPECIFIED CHRONICITY: ICD-10-CM

## 2021-11-09 RX ORDER — TRAMADOL HYDROCHLORIDE 50 MG/1
TABLET ORAL
Qty: 90 TABLET | Refills: 0 | Status: SHIPPED | OUTPATIENT
Start: 2021-11-09 | End: 2021-12-13

## 2021-11-09 NOTE — TELEPHONE ENCOUNTER
Tramadol  Last Written Prescription Date: 9/14/21  Last Fill Quantity: 90 # of Refills: 0  Last Office Visit: 8/18/21

## 2021-11-12 DIAGNOSIS — G89.29 OTHER CHRONIC PAIN: ICD-10-CM

## 2021-11-12 DIAGNOSIS — E11.9 TYPE 2 DIABETES MELLITUS WITHOUT COMPLICATION, WITHOUT LONG-TERM CURRENT USE OF INSULIN (H): Primary | ICD-10-CM

## 2021-11-15 ENCOUNTER — LAB (OUTPATIENT)
Dept: LAB | Facility: OTHER | Age: 86
End: 2021-11-15
Payer: MEDICARE

## 2021-11-15 DIAGNOSIS — E11.9 TYPE 2 DIABETES MELLITUS WITHOUT COMPLICATION, WITHOUT LONG-TERM CURRENT USE OF INSULIN (H): ICD-10-CM

## 2021-11-15 DIAGNOSIS — G89.29 OTHER CHRONIC PAIN: ICD-10-CM

## 2021-11-15 LAB
ALBUMIN SERPL-MCNC: 3.2 G/DL (ref 3.4–5)
ALP SERPL-CCNC: 112 U/L (ref 40–150)
ALT SERPL W P-5'-P-CCNC: 19 U/L (ref 0–70)
ANION GAP SERPL CALCULATED.3IONS-SCNC: 6 MMOL/L (ref 3–14)
AST SERPL W P-5'-P-CCNC: 15 U/L (ref 0–45)
BILIRUB SERPL-MCNC: 0.4 MG/DL (ref 0.2–1.3)
BUN SERPL-MCNC: 27 MG/DL (ref 7–30)
CALCIUM SERPL-MCNC: 8.9 MG/DL (ref 8.5–10.1)
CHLORIDE BLD-SCNC: 104 MMOL/L (ref 94–109)
CHOLEST SERPL-MCNC: 177 MG/DL
CO2 SERPL-SCNC: 27 MMOL/L (ref 20–32)
CREAT SERPL-MCNC: 1.72 MG/DL (ref 0.66–1.25)
CREAT UR-MCNC: 103 MG/DL
EST. AVERAGE GLUCOSE BLD GHB EST-MCNC: 134 MG/DL
FASTING STATUS PATIENT QL REPORTED: YES
GFR SERPL CREATININE-BSD FRML MDRD: 35 ML/MIN/1.73M2
GLUCOSE BLD-MCNC: 108 MG/DL (ref 70–99)
HBA1C MFR BLD: 6.3 % (ref 0–5.6)
HDLC SERPL-MCNC: 37 MG/DL
LDLC SERPL CALC-MCNC: 113 MG/DL
NONHDLC SERPL-MCNC: 140 MG/DL
POTASSIUM BLD-SCNC: 4 MMOL/L (ref 3.4–5.3)
PROT SERPL-MCNC: 8.2 G/DL (ref 6.8–8.8)
SODIUM SERPL-SCNC: 137 MMOL/L (ref 133–144)
T4 FREE SERPL-MCNC: 1.13 NG/DL (ref 0.76–1.46)
TRIGL SERPL-MCNC: 137 MG/DL
TSH SERPL DL<=0.005 MIU/L-ACNC: 4.07 MU/L (ref 0.4–4)

## 2021-11-15 PROCEDURE — 84439 ASSAY OF FREE THYROXINE: CPT | Mod: ZL

## 2021-11-15 PROCEDURE — 80053 COMPREHEN METABOLIC PANEL: CPT | Mod: ZL

## 2021-11-15 PROCEDURE — 83036 HEMOGLOBIN GLYCOSYLATED A1C: CPT | Mod: ZL

## 2021-11-15 PROCEDURE — 36415 COLL VENOUS BLD VENIPUNCTURE: CPT | Mod: ZL

## 2021-11-15 PROCEDURE — 84443 ASSAY THYROID STIM HORMONE: CPT | Mod: ZL

## 2021-11-15 PROCEDURE — 80061 LIPID PANEL: CPT | Mod: ZL

## 2021-11-15 PROCEDURE — 80307 DRUG TEST PRSMV CHEM ANLYZR: CPT | Mod: ZL

## 2021-11-17 ENCOUNTER — TELEPHONE (OUTPATIENT)
Dept: FAMILY MEDICINE | Facility: OTHER | Age: 86
End: 2021-11-17
Payer: COMMERCIAL

## 2021-11-17 ENCOUNTER — VIRTUAL VISIT (OUTPATIENT)
Dept: FAMILY MEDICINE | Facility: OTHER | Age: 86
End: 2021-11-17
Attending: FAMILY MEDICINE
Payer: COMMERCIAL

## 2021-11-17 DIAGNOSIS — K59.00 CONSTIPATION, UNSPECIFIED CONSTIPATION TYPE: Primary | ICD-10-CM

## 2021-11-17 DIAGNOSIS — K64.9 HEMORRHOIDS, UNSPECIFIED HEMORRHOID TYPE: ICD-10-CM

## 2021-11-17 PROCEDURE — 99213 OFFICE O/P EST LOW 20 MIN: CPT | Mod: 95 | Performed by: FAMILY MEDICINE

## 2021-11-17 RX ORDER — HYDROCORTISONE ACETATE 25 MG/1
25 SUPPOSITORY RECTAL 2 TIMES DAILY
Qty: 40 SUPPOSITORY | Refills: 3 | Status: SHIPPED | OUTPATIENT
Start: 2021-11-17

## 2021-11-17 NOTE — PROGRESS NOTES
"Aaron is a 87 year old who is being evaluated via a billable telephone visit.      What phone number would you like to be contacted at? [phone]  How would you like to obtain your AVS?     Assessment & Plan     Constipation, unspecified constipation type  Reviewed.  He has this his whole adult life.  Opiates contribute no doubt (tramadol).  He is finally today getting some movement, and the hemorrhoid is finally smaller.  He will watch closely and report ongoing concerns at all.      Hemorrhoids, unspecified hemorrhoid type  As above.  anusol HC sent.  Follow.                 BMI:   Estimated body mass index is 30.54 kg/m  as calculated from the following:    Height as of 2/16/21: 1.702 m (5' 7\").    Weight as of 8/18/21: 88.5 kg (195 lb).           No follow-ups on file.    Eric Bernal MD  [facility]    Subjective   Aaron is a 87 year old who presents for the following health issues     HPI     hemorrhoid      Duration: 4 days    Description (location/character/radiation): hemorrhoid    Intensity:  moderate    Accompanying signs and symptoms: constipation x 4 days    History (similar episodes/previous evaluation): None    Precipitating or alleviating factors: None    Therapies tried and outcome: preparation H, suppository             Review of Systems   Constitutional, HEENT, cardiovascular, pulmonary, gi and gu systems are negative, except as otherwise noted.      Objective           Vitals:  No vitals were obtained today due to virtual visit.    Physical Exam   healthy, alert and no distress  PSYCH: Alert and oriented times 3; coherent speech, normal   rate and volume, able to articulate logical thoughts, able   to abstract reason, no tangential thoughts, no hallucinations   or delusions  His affect is normal  RESP: No cough, no audible wheezing, able to talk in full sentences  Remainder of exam unable to be completed due to telephone visits                Phone call duration: 14 " minutes

## 2021-11-17 NOTE — TELEPHONE ENCOUNTER
8:48 AM    Reason for Call: Phone Call    Description: Kenyon is wondering if his appointment today can be changed to a telephone. He is having a bathroom issue and hemorrhoids . I did inform him that a physical cannot be done on phone and he was hoping at least for his issues. Please call Kenyon to advise.      Was an appointment offered for this call?    No    Preferred method for responding to this message: [phone]    If we cannot reach you directly, may we leave a detailed response at the number you provided?    Yes    Sarah Smallwood

## 2021-11-17 NOTE — PATIENT INSTRUCTIONS
Patient Education     Constipation (Adult)  Constipation means that you have bowel movements that are less frequent than usual. Stools often become very hard and difficult to pass.  Constipation is very common. At some point in life, it affects almost everyone. Since everyone's bowel habits are different, what is constipation to one person may not be to another. Your healthcare provider may do tests to diagnose constipation. It depends on what he or she finds when evaluating you.    Symptoms of constipation include:    Abdominal pain    Bloating    Vomiting    Painful bowel movements    Itching, swelling, bleeding, or pain around the anus  Causes  Constipation can have many causes. These include:    Diet low in fiber    Too much dairy    Not drinking enough liquids    Lack of exercise or physical activity (especially true for older adults)    Changes in lifestyle or daily routine, including pregnancy, aging, work, and travel    Frequent use or misuse of laxatives    Ignoring the urge to have a bowel movement or delaying it until later    Medicines, such as certain prescription pain medicines, iron supplements, antacids, certain antidepressants, and calcium supplements    Diseases like irritable bowel syndrome, bowel obstructions, stroke, diabetes, thyroid disease, Parkinson disease, hemorrhoids, and colon cancer  Complications  Potential complications of constipation can include:    Hemorrhoids    Rectal bleeding from hemorrhoids or anal fissures (skin tears)    Hernias    Dependency on laxatives    Chronic constipation    Fecal impaction, a severe form of constipation in which a large amount of hard stool is in your rectum that you can't pass    Bowel obstruction or perforation  Home care  All treatment should be done after talking with your healthcare provider. This is especially true if you have another medical problems, are taking prescription medicines, or are an older adult. Treatment most often involves  lifestyle changes. You may also need medicines. Your healthcare provider will tell you which will work best for you. Follow the advice below to help avoid this problem in the future.  Lifestyle changes  These lifestyle changes can help prevent constipation:    Diet. Eat a high-fiber diet, with fresh fruit and vegetables, and reduce dairy intake, meats, and processed foods    Fluids. It's important to get enough fluids each day. Drink plenty of water when you eat more fiber. If you are on diet that limits the amount of fluid you can have, talk about this with your healthcare provider.    Regular exercise. Check with your healthcare provider first.  Medicines  Take any medicines as directed. Some laxatives are safe to use only every now and then. Others can be taken on a regular basis. While laxatives don't cause bowel dependence, they are treating the symptoms. So your constipation may return if you don't make other changes. Talk with your healthcare provider or pharmacist if you have questions.  Prescription pain medicines can cause constipation. If you are taking this kind of medicine, ask your healthcare provider if you should also take a stool softener.  Medicines you may take to treat constipation include:    Fiber supplements    Stool softeners    Laxatives    Enemas    Rectal suppositories  Follow-up care  Follow up with your healthcare provider if symptoms don't get better in the next few days. You may need to have more tests or see a specialist.  Call 911  Call 911 if any of these occur:    Trouble breathing    Stiff, rigid abdomen that is severely painful to touch    Confusion    Fainting or loss of consciousness    Rapid heart rate    Chest pain  When to seek medical advice  Call your healthcare provider right away if any of these occur:    Fever of 100.4 F (38 C) or higher, or as directed by your healthcare provider    Failure to resume normal bowel movements    Pain in your abdomen or back gets  worse    Nausea or vomiting    Swelling in your abdomen    Blood in the stool    Black, tarry stool    Involuntary weight loss    Weakness  Violet[employer] last reviewed this educational content on 6/1/2018 2000-2021 [employer]. All rights reserved. This information is not intended as a substitute for professional medical care. Always follow your healthcare professional's instructions.

## 2021-11-18 LAB
N-NORTRAMADOL/CREAT UR CFM: 7107 NG/MG {CREAT}
O-NORTRAMADOL UR CFM-MCNC: 7320 NG/ML
OXAZEPAM UR QL CFM: PRESENT
TEMAZEPAM UR QL CFM: PRESENT
TRAMADOL CTO UR CFM-MCNC: ABNORMAL NG/ML
TRAMADOL/CREAT UR: ABNORMAL

## 2021-12-11 DIAGNOSIS — M54.41 BILATERAL LOW BACK PAIN WITH RIGHT-SIDED SCIATICA, UNSPECIFIED CHRONICITY: ICD-10-CM

## 2021-12-13 RX ORDER — TRAMADOL HYDROCHLORIDE 50 MG/1
TABLET ORAL
Qty: 90 TABLET | Refills: 0 | Status: SHIPPED | OUTPATIENT
Start: 2021-12-13 | End: 2022-01-17

## 2021-12-13 NOTE — TELEPHONE ENCOUNTER
Ultram      Last Written Prescription Date:  11/9/21  Last Fill Quantity: 90,   # refills: 0  Last Office Visit: 11/17/21  Future Office visit:    Next 5 appointments (look out 90 days)    Feb 17, 2022 10:45 AM  (Arrive by 10:30 AM)  PHYS[facility] with Eric [facility]al MD  [facility] [address] [facility] [address]  [phone]           Routing refill request to provider for review/approval because:

## 2022-01-04 ENCOUNTER — OFFICE VISIT (OUTPATIENT)
Dept: CHIROPRACTIC MEDICINE | Facility: OTHER | Age: 87
End: 2022-01-04
Attending: CHIROPRACTOR
Payer: OTHER MISCELLANEOUS

## 2022-01-04 DIAGNOSIS — M54.50 ACUTE BILATERAL LOW BACK PAIN WITHOUT SCIATICA: ICD-10-CM

## 2022-01-04 DIAGNOSIS — M99.02 SEGMENTAL AND SOMATIC DYSFUNCTION OF THORACIC REGION: ICD-10-CM

## 2022-01-04 DIAGNOSIS — M99.03 SEGMENTAL AND SOMATIC DYSFUNCTION OF LUMBAR REGION: Primary | ICD-10-CM

## 2022-01-04 PROCEDURE — 98941 CHIROPRACT MANJ 3-4 REGIONS: CPT | Mod: AT | Performed by: CHIROPRACTOR

## 2022-01-06 NOTE — PROGRESS NOTES
Subjective Finding:    Chief compalint: Patient presents with:  Back Pain  , Pain Scale: 5/10, Intensity: dull and ache, Duration: 3 weeks, Change since last visit: , Radiating: bilateral buttock.    Date of injury:     Activities that the pain restricts:   Home/household/hobbies/social activities: yes.  Work duties: yes.  Sleep: no.  Makes symptoms better: rest.  Makes symptoms worse: activity, lumbar extension and lumbar flexion.  Have you seen anyone else for the symptoms? no.  Work related: no  Automobile related injury: no.    Objective and Assessment:    Posture Analysis:   High shoulder: right.  Head tilt: right.  High iliac crest: right.  Head carriage: neutral.  Thoracic Kyphosis: neutral.  Lumbar Lordosis: forward.    Lumbar Range of Motion: flexion decreased, extension decreased, left lateral flexion decreased and right lateral flexion decreased.  Cervical Range of Motion: .  Thoracic Range of Motion: .  Extremity Range of Motion: .    Palpation:   Quad lumb: bilateral, referred pain: no     Segmental dysfunction pre-treatment: T10  L4-5  SI.  C7    Assessment post-treatment:  Cervical: ROM increased.  Thoracic: ROM increased.  Lumbar: ROM increased, pain and tenderness decreased and muscle spasm decreased.    Comments: .      Complicating Factors: .    Plan / Procedure:    Expected release date: .  Treatment plan: 1 times per month.  Instructed patient: ice 20 minutes every other hour as needed and rest.  Short term goals: reduce pain.  Long term goals: restore normal function.  Prognosis: excellent.             
yes

## 2022-01-11 ENCOUNTER — TRANSFERRED RECORDS (OUTPATIENT)
Dept: HEALTH INFORMATION MANAGEMENT | Facility: CLINIC | Age: 87
End: 2022-01-11

## 2022-02-16 ENCOUNTER — TELEPHONE (OUTPATIENT)
Dept: FAMILY MEDICINE | Facility: OTHER | Age: 87
End: 2022-02-16
Payer: COMMERCIAL

## 2022-02-16 NOTE — TELEPHONE ENCOUNTER
9:53 AM    Reason for Call: Phone Call    Description: Aaron rescheduled his Physical from 02/17/2022 to March and is wondering if he needs labs. Please call Aaron to advise    Was an appointment offered for this call?  No    Preferred method for responding to this message: [phone]    If we cannot reach you directly, may we leave a detailed response at the number you provided?   Yes    Can this message wait until your PCP/provider returns, if available today?  Yes    Sarah Smallwood

## 2022-03-03 NOTE — PROGRESS NOTES
Occupational Visit     SUBJECTIVE:  Kenyon Rivera, 87 year old, male is seen for follow up of occupational injury. Date of injury is 8/31/1975.    Linked Episodes   Type: Episode: Status: Noted: Resolved: Last update: Updated by:   WORK COMP [employer] Active 5/31/1975  3/3/2022  4:16 PM Orly Domingo LPN      Comments:       Musculoskeletal problem/pain       Duration: 5/31/1975    Description  Location: low back     Intensity:  moderate    Accompanying signs and symptoms: radiation of pain to bilateral leg, numbness in bilateral legs     History  Previous similar problem: YES  Previous evaluation:  x-ray and MRI    Precipitating or alleviating factors:  Trauma or overuse: YES- work injury  Aggravating factors include: none    Therapies tried and outcome: acetaminophen and Ibuprofen         Allergies   Allergen Reactions     Chlorzoxazone      Parafon Forte         Review of Systems:  Constitutional, HEENT, cardiovascular, pulmonary, gi and gu systems are negative, except as otherwise noted.      OBJECTIVE:  Vitals:    03/08/22 1004   BP: 128/64   Pulse: 103   Temp: 96.8  F (36  C)   SpO2: 96%               Exam:  Walks with slight limp.  Can bend at waist to tie shoes with pain.        Labs: No results found for this or any previous visit (from the past 24 hour(s)).      ASSESSMENT/PLAN:  (G89.29) Other chronic pain  (primary encounter diagnosis)  Comment: ongoing  Plan: Drug Confirmation Panel Urine with Creatinine        Reliable patient.  Update UDS today.  Contract done.  PDMP reviewed.  Nice review.  He is minimizing the tramadol and some days only has to take it twice.  He is using tylenol as well.     (M54.41) Bilateral low back pain with right-sided sciatica, unspecified chronicity  Comment: as above.    Plan: Physical Therapy Referral, traMADol (ULTRAM) 50        MG tablet, cyclobenzaprine (FLEXERIL) 10 MG         tablet        As above.  Update PT as well.  Refill meds.  No side effects.

## 2022-03-08 ENCOUNTER — OFFICE VISIT (OUTPATIENT)
Dept: FAMILY MEDICINE | Facility: OTHER | Age: 87
End: 2022-03-08
Attending: FAMILY MEDICINE
Payer: MEDICARE

## 2022-03-08 ENCOUNTER — OFFICE VISIT (OUTPATIENT)
Dept: FAMILY MEDICINE | Facility: OTHER | Age: 87
End: 2022-03-08
Attending: FAMILY MEDICINE
Payer: OTHER MISCELLANEOUS

## 2022-03-08 VITALS
DIASTOLIC BLOOD PRESSURE: 64 MMHG | BODY MASS INDEX: 30.37 KG/M2 | TEMPERATURE: 96.8 F | HEIGHT: 66 IN | WEIGHT: 189 LBS | SYSTOLIC BLOOD PRESSURE: 128 MMHG | OXYGEN SATURATION: 96 % | HEART RATE: 103 BPM

## 2022-03-08 VITALS
OXYGEN SATURATION: 96 % | BODY MASS INDEX: 30.37 KG/M2 | HEART RATE: 103 BPM | TEMPERATURE: 96.8 F | HEIGHT: 66 IN | SYSTOLIC BLOOD PRESSURE: 128 MMHG | WEIGHT: 189 LBS | DIASTOLIC BLOOD PRESSURE: 64 MMHG

## 2022-03-08 DIAGNOSIS — G89.29 OTHER CHRONIC PAIN: ICD-10-CM

## 2022-03-08 DIAGNOSIS — M54.41 BILATERAL LOW BACK PAIN WITH RIGHT-SIDED SCIATICA, UNSPECIFIED CHRONICITY: ICD-10-CM

## 2022-03-08 DIAGNOSIS — G89.29 OTHER CHRONIC PAIN: Primary | ICD-10-CM

## 2022-03-08 DIAGNOSIS — E11.9 TYPE 2 DIABETES MELLITUS WITHOUT COMPLICATION, WITHOUT LONG-TERM CURRENT USE OF INSULIN (H): Primary | ICD-10-CM

## 2022-03-08 LAB
ANION GAP SERPL CALCULATED.3IONS-SCNC: 6 MMOL/L (ref 3–14)
BUN SERPL-MCNC: 30 MG/DL (ref 7–30)
CALCIUM SERPL-MCNC: 9.3 MG/DL (ref 8.5–10.1)
CHLORIDE BLD-SCNC: 105 MMOL/L (ref 94–109)
CO2 SERPL-SCNC: 26 MMOL/L (ref 20–32)
CREAT SERPL-MCNC: 1.58 MG/DL (ref 0.66–1.25)
CREAT UR-MCNC: 41 MG/DL
EST. AVERAGE GLUCOSE BLD GHB EST-MCNC: 137 MG/DL
GFR SERPL CREATININE-BSD FRML MDRD: 42 ML/MIN/1.73M2
GLUCOSE BLD-MCNC: 116 MG/DL (ref 70–99)
HBA1C MFR BLD: 6.4 % (ref 0–5.6)
POTASSIUM BLD-SCNC: 4.4 MMOL/L (ref 3.4–5.3)
SODIUM SERPL-SCNC: 137 MMOL/L (ref 133–144)

## 2022-03-08 PROCEDURE — 99214 OFFICE O/P EST MOD 30 MIN: CPT | Performed by: FAMILY MEDICINE

## 2022-03-08 PROCEDURE — 80307 DRUG TEST PRSMV CHEM ANLYZR: CPT | Mod: ZL | Performed by: FAMILY MEDICINE

## 2022-03-08 PROCEDURE — 83036 HEMOGLOBIN GLYCOSYLATED A1C: CPT | Mod: ZL | Performed by: FAMILY MEDICINE

## 2022-03-08 PROCEDURE — 99213 OFFICE O/P EST LOW 20 MIN: CPT | Performed by: FAMILY MEDICINE

## 2022-03-08 PROCEDURE — 80048 BASIC METABOLIC PNL TOTAL CA: CPT | Mod: ZL | Performed by: FAMILY MEDICINE

## 2022-03-08 PROCEDURE — G0463 HOSPITAL OUTPT CLINIC VISIT: HCPCS

## 2022-03-08 PROCEDURE — 36415 COLL VENOUS BLD VENIPUNCTURE: CPT | Mod: ZL | Performed by: FAMILY MEDICINE

## 2022-03-08 RX ORDER — CYCLOBENZAPRINE HCL 10 MG
10 TABLET ORAL 2 TIMES DAILY
Qty: 180 TABLET | Refills: 3 | Status: SHIPPED | OUTPATIENT
Start: 2022-03-08 | End: 2022-12-11

## 2022-03-08 RX ORDER — TRAMADOL HYDROCHLORIDE 50 MG/1
50 TABLET ORAL EVERY 8 HOURS PRN
Qty: 90 TABLET | Refills: 0 | Status: SHIPPED | OUTPATIENT
Start: 2022-03-08 | End: 2022-04-27

## 2022-03-08 ASSESSMENT — ANXIETY QUESTIONNAIRES
GAD7 TOTAL SCORE: 4
IF YOU CHECKED OFF ANY PROBLEMS ON THIS QUESTIONNAIRE, HOW DIFFICULT HAVE THESE PROBLEMS MADE IT FOR YOU TO DO YOUR WORK, TAKE CARE OF THINGS AT HOME, OR GET ALONG WITH OTHER PEOPLE: NOT DIFFICULT AT ALL
6. BECOMING EASILY ANNOYED OR IRRITABLE: SEVERAL DAYS
5. BEING SO RESTLESS THAT IT IS HARD TO SIT STILL: SEVERAL DAYS
7. FEELING AFRAID AS IF SOMETHING AWFUL MIGHT HAPPEN: NOT AT ALL
3. WORRYING TOO MUCH ABOUT DIFFERENT THINGS: SEVERAL DAYS
2. NOT BEING ABLE TO STOP OR CONTROL WORRYING: NOT AT ALL
1. FEELING NERVOUS, ANXIOUS, OR ON EDGE: SEVERAL DAYS

## 2022-03-08 ASSESSMENT — PATIENT HEALTH QUESTIONNAIRE - PHQ9: 5. POOR APPETITE OR OVEREATING: NOT AT ALL

## 2022-03-08 ASSESSMENT — PAIN SCALES - GENERAL
PAINLEVEL: MODERATE PAIN (4)
PAINLEVEL: MODERATE PAIN (4)

## 2022-03-08 NOTE — NURSING NOTE
"Chief Complaint   Patient presents with     Diabetes       Initial /64   Pulse 103   Temp 96.8  F (36  C)   Ht 1.683 m (5' 6.25\")   Wt 85.7 kg (189 lb)   SpO2 96%   BMI 30.28 kg/m   Estimated body mass index is 30.28 kg/m  as calculated from the following:    Height as of this encounter: 1.683 m (5' 6.25\").    Weight as of this encounter: 85.7 kg (189 lb).  Medication Reconciliation: complete  Gladis Hernandez, LPN  "

## 2022-03-08 NOTE — PATIENT INSTRUCTIONS
"  Patient Education   Diabetes ABCs  Work with Your Health Care Team to Manage Diabetes!     A1c (hemoglobin A1c test):  Check at least every 6 months.  Goal without diabetes: Less than 6%  Goal with diabetes: Less than 7%, but your doctor may have a different goal for you.  My Goal: ___________________   BG (blood glucose):  Goals without diabetes:  Before meals: 60 to 99 mg/dL  After meals (2 hours): under 140 mg/dL  Goals with diabetes:   Before meals: 80 to 130 mg/dL  After meals: (2 hours): under 180 mg/dL  My Goals:  Before meals: __________  After meals: ___________   Blood pressure:  Check at every doctor visit.   Goal: Less than 140/90    Cholesterol:   Check at least 1 time a year.  Goals for LDL (\"bad\" cholesterol):  With heart disease: Less than 70 mg/dL  Without heart disease: Less than 100 mg/dL   Eyes:   Have a dilated eye exam every year.   Teeth:   See your dentist twice a year. People with diabetes have a higher risk of gum disease.  Smoking:   If you smoke, it's important to quit. Make a plan with help from your health care team.  Weight:   Work towards a healthy weight with help from your diabetes educator or dietitian.  Kidneys:     Check your urine protein 1 time each year.    Protect your kidneys by keeping your blood pressure normal.  Feet:    Check your feet every day for signs of injury, dry skin, cracks, cuts, swelling, or blisters.    Ask your doctor to check your feet at every visit.    Wear shoes and socks that fit well.    Don't go barefoot.  Sex:   Talk about erectile dysfunction (ED) and female sexual dysfunction (FSD) with your doctor.  For informational purposes only. Not to replace the advice of your health care provider. Copyright   2018 [employer]. All rights reserved. Illustration ID [id number]   [id number] [place]. Clinically reviewed by [facility]. SMARTworks 672348 - Rev 07/21.       "

## 2022-03-08 NOTE — LETTER
Opioid / Opioid Plus Controlled Substance Agreement    This is an agreement between you and your provider about the safe and appropriate use of controlled substance/opioids prescribed by your care team. Controlled substances are medicines that can cause physical and mental dependence (abuse).    There are strict laws about having and using these medicines. We here at [facility] are committing to working with you in your efforts to get better. To support you in this work, we ll help you schedule regular office appointments for medicine refills. If we must cancel or change your appointment for any reason, we ll make sure you have enough medicine to last until your next appointment.     As a Provider, I will:    Listen carefully to your concerns and treat you with respect.     Recommend a treatment plan that I believe is in your best interest. This plan may involve therapies other than opioid pain medication.     Talk with you often about the possible benefits, and the risk of harm of any medicine that we prescribe for you.     Provide a plan on how to taper (discontinue or go off) using this medicine if the decision is made to stop its use.    As a Patient, I understand that opioid(s):     Are a controlled substance prescribed by my care team to help me function or work and manage my condition(s).     Are strong medicines and can cause serious side effects such as:    Drowsiness, which can seriously affect my driving ability    A lower breathing rate, enough to cause death    Harm to my thinking ability     Depression     Abuse of and addiction to this medicine    Need to be taken exactly as prescribed. Combining opioids with certain medicines or chemicals (such as illegal drugs, sedatives, sleeping pills, and benzodiazepines) can be dangerous or even fatal. If I stop opioids suddenly, I may have severe withdrawal symptoms.    Do not work for all types of pain nor for all patients. If they re not helpful, I may  be asked to stop them.        The risks, benefits and side effects of these medicine(s) were explained to me. I agree that:  1. I will take part in other treatments as advised by my care team. This may be psychiatry or counseling, physical therapy, behavioral therapy, group treatment or a referral to a specialist.     2. I will keep all my appointments. I understand that this is part of the monitoring of opioids. My care team may require an office visit for EVERY opioid/controlled substance refill. If I miss appointments or don t follow instructions, my care team may stop my medicine.    3. I will take my medicines as prescribed. I will not change the dose or schedule unless my care team tells me to. There will be no refills if I run out early.     4. I may be asked to come to the clinic and complete a urine drug test or complete a pill count at any time. If I don t give a urine sample or participate in a pill count, the care team may stop my medicine.    5. I will only receive prescriptions from this clinic for chronic pain. If I am treated by another provider for acute pain issues, I will tell them that I am taking opioid pain medication for chronic pain and that I have a treatment agreement with this provider. I will inform my [facility] care team within one business day if I am given a prescription for any pain medication by another healthcare provider. My [facility] care team can contact other providers and pharmacists about my use of any medicines.    6. It is up to me to make sure that I don t run out of my medicines on weekends or holidays. If my care team is willing to refill my opioid prescription without a visit, I must request refills only during office hours. Refills may take up to 3 business days to process. I will use one pharmacy to fill all my opioid and other controlled substance prescriptions. I will notify the clinic about any changes to my insurance or medication  availability.    7. I am responsible for my prescriptions. If the medicine/prescription is lost, stolen or destroyed, it will not be replaced. I also agree not to share controlled substance medicines with anyone.    8. I am aware I should not use any illegal or recreational drugs. I agree not to drink alcohol unless my care team says I can.       9. If I enroll in the [place] [facility] program, I will tell my care team prior to my next refill.     10. I will tell my care team right away if I become pregnant, have a new medical problem treated outside of my regular clinic, or have a change in my medications.    11. I understand that this medicine can affect my thinking, judgment and reaction time. Alcohol and drugs affect the brain and body, which can affect the safety of my driving. Being under the influence of alcohol or drugs can affect my decision-making, behaviors, personal safety, and the safety of others. Driving while impaired (DWI) can occur if a person is driving, operating, or in physical control of a car, motorcycle, boat, snowmobile, ATV, motorbike, off-road vehicle, or any other motor vehicle ([place] Statute 169A.20). I understand the risk if I choose to drive or operate any vehicle or machinery.    I understand that if I do not follow any of the conditions above, my prescriptions or treatment may be stopped or changed.          Opioids  What You Need to Know    What are opioids?   Opioids are pain medicines that must be prescribed by a doctor. They are also known as narcotics.     Examples are:   1. morphine (MS Contin, Evelyn)  2. oxycodone (Oxycontin)  3. oxycodone and acetaminophen (Percocet)  4. hydrocodone and acetaminophen (Vicodin, Norco)   5. fentanyl patch (Duragesic)   6. hydromorphone (Dilaudid)   7. methadone  8. codeine (Tylenol #3)     What do opioids do well?   Opioids are best for severe short-term pain such as after a surgery or injury. They may work well for cancer pain. They may  help some people with long-lasting (chronic) pain.     What do opioids NOT do well?   Opioids never get rid of pain entirely, and they don t work well for most patients with chronic pain. Opioids don t reduce swelling, one of the causes of pain.                                    Other ways to manage chronic pain and improve function include:       Treat the health problem that may be causing pain    Anti-inflammation medicines, which reduce swelling and tenderness, such as ibuprofen (Advil, Motrin) or naproxen (Aleve)    Acetaminophen (Tylenol)    Antidepressants and anti-seizure medicines, especially for nerve pain    Topical treatments such as patches or creams    Injections or nerve blocks    Chiropractic or osteopathic treatment    Acupuncture, massage, deep breathing, meditation, visual imagery, aromatherapy    Use heat or ice at the pain site    Physical therapy     Exercise    Stop smoking    Take part in therapy       Risks and side effects     Talk to your doctor before you start or decide to keep taking opioids. Possible side effects include:      Lowering your breathing rate enough to cause death    Overdose, including death, especially if taking higher than prescribed doses    Worse depression symptoms; less pleasure in things you usually enjoy    Feeling tired or sluggish    Slower thoughts or cloudy thinking    Being more sensitive to pain over time; pain is harder to control    Trouble sleeping or restless sleep    Changes in hormone levels (for example, less testosterone)    Changes in sex drive or ability to have sex    Constipation    Unsafe driving    Itching and sweating    Dizziness    Nausea, throwing up and dry mouth    What else should I know about opioids?    Opioids may lead to dependence, tolerance, or addiction.      Dependence means that if you stop or reduce the medicine too quickly, you will have withdrawal symptoms. These include loose poop (diarrhea), jitters, flu-like symptoms,  nervousness and tremors. Dependence is not the same as addiction.                       Tolerance means needing higher doses over time to get the same effect. This may increase the chance of serious side effects.      Addiction is when people improperly use a substance that harms their body, their mind or their relations with others. Use of opiates can cause a relapse of addiction if you have a history of drug or alcohol abuse.      People who have used opioids for a long time may have a lower quality of life, worse depression, higher levels of pain and more visits to doctors.    You can overdose on opioids. Take these steps to lower your risk of overdose:    1. Recognize the signs:  Signs of overdose include decrease or loss of consciousness (blackout), slowed breathing, trouble waking up and blue lips. If someone is worried about overdose, they should call 911.    2. Talk to your doctor about Narcan (naloxone).   If you are at risk for overdose, you may be given a prescription for Narcan. This medicine very quickly reverses the effects of opioids.   If you overdose, a friend or family member can give you Narcan while waiting for the ambulance. They need to know the signs of overdose and how to give Narcan.     3. Don't use alcohol or street drugs.   Taking them with opioids can cause death.    4. Do not take any of these medicines unless your doctor says it s OK. Taking these with opioids can cause death:    Benzodiazepines, such as lorazepam (Ativan), alprazolam (Xanax) or diazepam (Valium)    Muscle relaxers, such as cyclobenzaprine (Flexeril)    Sleeping pills like zolpidem (Ambien)     Other opioids      How to keep you and other people safe while taking opioids:    1. Never share your opioids with others.  Opioid medicines are regulated by the Drug Enforcement Agency ([employer]). Selling or sharing medications is a criminal act.    2. Be sure to store opioids in a secure place, locked up if possible. Young children  can easily swallow them and overdose.    3. When you are traveling with your medicines, keep them in the original bottles. If you use a pill box, be sure you also carry a copy of your medicine list from your clinic or pharmacy.    4. Safe disposal of opioids    Most pharmacies have places to get rid of medicine, called disposal kiosks. Medicine disposal options are also available in every [facility]. Search your county and  medication disposal  to find more options. You can find more details at:  [email or web address]./living-green/managing-unwanted-medications     I agree that my provider, clinic care team, and pharmacy may work with any city, state or federal law enforcement agency that investigates the misuse, sale, or other diversion of my controlled medicine. I will allow my provider to discuss my care with, or share a copy of, this agreement with any other treating provider, pharmacy or emergency room where I receive care.    I have read this agreement and have asked questions about anything I did not understand.    _______________________________________________________  Patient Signature - Kenyon Rivera _____________________                   Date     _______________________________________________________  Provider Signature - Eric Bernal MD   _____________________                   Date     _______________________________________________________  Witness Signature (required if provider not present while patient signing)   _____________________                   Date

## 2022-03-08 NOTE — PATIENT INSTRUCTIONS
Patient Education     Back Care Tips     Caring for your back  These are things you can do to prevent a recurrence of acute back pain and to reduce symptoms from chronic back pain:    Stay at a healthy weight. If you are overweight, losing weight will help most types of back pain.    Exercise is an important part of recovery from most types of back pain. The muscles behind and in front of the spine support the back. This means strengthening both the back muscles and the abdominal muscles will provide better support for your spine.     Swimming and brisk walking are good overall exercises to improve your fitness level.    Practice safe lifting methods (see below).    Practice good posture when sitting, standing, and walking. Don't sit for a long time. This puts more stress on the lower back than standing or walking.    Wear quality shoes with good arch support. Foot and ankle alignment can affect back symptoms. Don't wear high heels.    Therapeutic massage can help relax the back muscles without stretching them.    During the first 24 to 72 hours after an acute injury or flare-up of chronic back pain, put an ice pack on the painful area for 20 minutes and then remove it for 20 minutes. Do thisover a period of 60 to 90 minutes, or several times a day. As a safety precaution, don't use a heating pad at bedtime. Sleeping on a heating pad can lead to skin burns or tissue damage.    You can alternate using ice and heat.  Medicines  Talk with your healthcare provider before using medicines, especially if you have other health problems or are taking other medicines.    You may use over-the-counter medicines, such as acetaminophen, ibuprofen, or naprosyn to control pain, unless your healthcare provider prescribed other pain medicine. Talk with your healthcare provider before taking any medicines if you have a chronic condition such as diabetes, liver or kidney disease, stomach ulcers, or digestive bleeding, or are taking  blood thinners.    Be careful if you are given prescription pain medicines, opioids, or medicine for muscle spasm. They can cause drowsiness, and affect your coordination, reflexes, and judgment. Don't drive or operate heavy machinery while taking these types of medicines. Take prescription pain medicine only as prescribed by your healthcare provider.  Lumbar stretch  This simple stretch will help relax muscle spasm and keep your back more limber. If exercise makes your back pain worse, don t do it.    Lie on your back with your knees bent and both feet on the ground.    Slowly raise your left knee to your chest as you flatten your lower back against the floor. Hold for 5 seconds.    Relax and repeat the exercise with your right knee.    Do 10 of these exercises for each leg.  Safe lifting method    Don t bend over at the waist to lift an object off the floor.  Instead, bend your knees and hips in a squat.     Keep your back and head upright    Hold the object close to your body, directly in front of you.    Straighten your legs to lift the object.     Lower the object to the floor in the reverse fashion.    If you must slide something across the floor, push it.    Posture tips  Sitting  Sit in chairs with straight backs or low-back support. Keep your knees lower than your hips, with your feet flat on the floor.  When driving, sit up straight. Adjust the seat forward so you are not leaning toward the steering wheel.  A small pillow or rolled towel behind your lower back may help if you are driving long distances.   Standing  When standing for long periods, shift most of your weight to one leg at a time. Switch legs every few minutes.   Sleeping  The best way to sleep is on your side with your knees bent. Put a low pillow under your head to support your neck in a neutral spine position. Don't use thick pillows that bend your neck to one side. Put a pillow between your legs to further relax your lower back. If you  "sleep on your back, put pillows under your knees to support your legs in a slightly flexed position. Use a firm mattress. If your mattress sags, replace it, or use a 1/2-inch plywood board under the mattress to add support.  Follow-up care  Follow up with your healthcare provider, or as advised.  If X-rays, a CT scan or an MRI scan were taken, they may be reviewed by a radiologist. You will be told of any new findings that may affect your care.  Call 911  Call 911 if any of the following occur:    Trouble breathing    Confusion    Very drowsy    Fainting or loss of consciousness    Rapid or very slow heart rate    Loss of  bowel or bladder control  When to seek medical advice  Call your healthcare provider right away if any of the following occur:    Pain becomes worse or spreads to your arms or legs    Weakness or numbness in one or both arms or legs    Numbness in the groin area  [employer] last reviewed this educational content on 11/1/2019 2000-2021 [employer]. All rights reserved. This information is not intended as a substitute for professional medical care. Always follow your healthcare professional's instructions.           Patient Education   Diabetes ABCs  Work with Your Health Care Team to Manage Diabetes!     A1c (hemoglobin A1c test):  Check at least every 6 months.  Goal without diabetes: Less than 6%  Goal with diabetes: Less than 7%, but your doctor may have a different goal for you.  My Goal: ___________________   BG (blood glucose):  Goals without diabetes:  Before meals: 60 to 99 mg/dL  After meals (2 hours): under 140 mg/dL  Goals with diabetes:   Before meals: 80 to 130 mg/dL  After meals: (2 hours): under 180 mg/dL  My Goals:  Before meals: __________  After meals: ___________   Blood pressure:  Check at every doctor visit.   Goal: Less than 140/90    Cholesterol:   Check at least 1 time a year.  Goals for LDL (\"bad\" cholesterol):  With heart disease: Less than 70 mg/dL  Without " heart disease: Less than 100 mg/dL   Eyes:   Have a dilated eye exam every year.   Teeth:   See your dentist twice a year. People with diabetes have a higher risk of gum disease.  Smoking:   If you smoke, it's important to quit. Make a plan with help from your health care team.  Weight:   Work towards a healthy weight with help from your diabetes educator or dietitian.  Kidneys:     Check your urine protein 1 time each year.    Protect your kidneys by keeping your blood pressure normal.  Feet:    Check your feet every day for signs of injury, dry skin, cracks, cuts, swelling, or blisters.    Ask your doctor to check your feet at every visit.    Wear shoes and socks that fit well.    Don't go barefoot.  Sex:   Talk about erectile dysfunction (ED) and female sexual dysfunction (FSD) with your doctor.  For informational purposes only. Not to replace the advice of your health care provider. Copyright   2018 [employer]. All rights reserved. Illustration ID [id number]   [employer]. Clinically reviewed by [employer]. [employer] [id number] - Rev 07/21.

## 2022-03-08 NOTE — NURSING NOTE
"Chief Complaint   Patient presents with     Work Comp       Initial /64   Pulse 103   Temp 96.8  F (36  C)   Ht 1.683 m (5' 6.25\")   Wt 85.7 kg (189 lb)   SpO2 96%   BMI 30.28 kg/m   Estimated body mass index is 30.28 kg/m  as calculated from the following:    Height as of this encounter: 1.683 m (5' 6.25\").    Weight as of this encounter: 85.7 kg (189 lb).  Medication Reconciliation: complete  Gladis Hernandez, LPN  "

## 2022-03-08 NOTE — PROGRESS NOTES
"  Assessment & Plan     Type 2 diabetes mellitus without complication, without long-term current use of insulin (H)  Update and follow.  Stable foot exam.  No change in cares.  Update.    - Basic metabolic panel; Future  - Hemoglobin A1c; Future  - [place] FOOT EXAM NO CHARGE  - Basic metabolic panel  - Hemoglobin A1c    Other chronic pain  Addressed under different visit.  Disregard.    - Drug Confirmation Panel Urine with Creatinine             BMI:   Estimated body mass index is 30.28 kg/m  as calculated from the following:    Height as of this encounter: 1.683 m (5' 6.25\").    Weight as of this encounter: 85.7 kg (189 lb).           No follow-ups on file.    Eric Bernal MD  [facility]    Toyin Walekr is a 87 year old who presents for the following health issues     HPI     Diabetes Follow-up      How often are you checking your blood sugar? Not at all    What concerns do you have today about your diabetes? None     Do you have any of these symptoms? (Select all that apply)  Numbness in feet and Burning in feet    Have you had a diabetic eye exam in the last 12 months? No        BP Readings from Last 2 Encounters:   03/08/22 128/64   08/18/21 114/62     Hemoglobin A1C POCT (%)   Date Value   02/16/2021 6.1 (H)   11/12/2020 6.4 (H)     Hemoglobin A1C (%)   Date Value   11/15/2021 6.3 (H)   08/18/2021 6.1 (H)     LDL Cholesterol Calculated (mg/dL)   Date Value   11/15/2021 113 (H)   11/12/2020 87   11/01/2019 112 (H)         Hypertension Follow-up      Do you check your blood pressure regularly outside of the clinic? Yes     Are you following a low salt diet? Yes    Are your blood pressures ever more than 140 on the top number (systolic) OR more   than 90 on the bottom number (diastolic), for example 140/90? No    Diabetic foot exam   1. foot deformity   Yes  2. Current or previous foot ulceration     No  3. Current or previous pre-ulcerative calluses     Yes  4. previous partial " "amputation of one or both feet or complete amputation of one foot     No  5. peripheral neuropathy with evidence of callus formation     No  6. poor circulation     No  Approval given for 3 pairs of diabetic shoes and inserts if patient desires.        Review of Systems   Constitutional, HEENT, cardiovascular, pulmonary, gi and gu systems are negative, except as otherwise noted.      Objective    /64   Pulse 103   Temp 96.8  F (36  C)   Ht 1.683 m (5' 6.25\")   Wt 85.7 kg (189 lb)   SpO2 96%   BMI 30.28 kg/m    Body mass index is 30.28 kg/m .  Physical Exam   GENERAL: healthy, alert and no distress  NECK: no adenopathy, no asymmetry, masses, or scars and thyroid normal to palpation  RESP: lungs clear to auscultation - no rales, rhonchi or wheezes  CV: regular rate and rhythm, normal S1 S2, no S3 or S4, no murmur, click or rub, no peripheral edema and peripheral pulses strong  ABDOMEN: soft, nontender, no hepatosplenomegaly, no masses and bowel sounds normal  MS: no gross musculoskeletal defects noted, no edema    Labs pending.             "

## 2022-03-09 DIAGNOSIS — E11.9 TYPE 2 DIABETES MELLITUS WITHOUT COMPLICATION, WITHOUT LONG-TERM CURRENT USE OF INSULIN (H): Primary | ICD-10-CM

## 2022-03-09 ASSESSMENT — ANXIETY QUESTIONNAIRES: GAD7 TOTAL SCORE: 4

## 2022-03-13 LAB
N-NORTRAMADOL/CREAT UR CFM: 9805 NG/MG {CREAT}
O-NORTRAMADOL UR CFM-MCNC: 4020 NG/ML
OXAZEPAM UR QL CFM: PRESENT
TEMAZEPAM UR QL CFM: PRESENT
TRAMADOL CTO UR CFM-MCNC: ABNORMAL NG/ML
TRAMADOL/CREAT UR: ABNORMAL NG/MG {CREAT}

## 2022-03-15 ENCOUNTER — TRANSFERRED RECORDS (OUTPATIENT)
Dept: HEALTH INFORMATION MANAGEMENT | Facility: CLINIC | Age: 87
End: 2022-03-15

## 2022-03-15 LAB
ALBUMIN (URINE) MG/SPEC: 14 MG/L
ALT SERPL-CCNC: 11 U/L (ref 13–61)
AST SERPL-CCNC: 18 U/L (ref 15–37)
CHOLESTEROL (EXTERNAL): 165 MG/DL (ref 0–200)
CREATININE (EXTERNAL): 1.5 MG/DL (ref 0.7–1.2)
CREATININE (URINE): 73.9 MG/DL (ref 58–161)
GLUCOSE (EXTERNAL): 111 MG/DL (ref 74–100)
HBA1C MFR BLD: 6.4 % (ref 4–6)
HDLC SERPL-MCNC: 33 MG/DL
LDL CHOLESTEROL CALCULATED (EXTERNAL): 104 MG/DL
NON HDL CHOLESTEROL (EXTERNAL): 132 MG/DL
POTASSIUM (EXTERNAL): 4.5 MMOL/L (ref 3.5–5.1)
TRIGLYCERIDES (EXTERNAL): 142 MG/DL (ref 0–150)

## 2022-03-23 ENCOUNTER — HOSPITAL ENCOUNTER (OUTPATIENT)
Dept: PHYSICAL THERAPY | Facility: HOSPITAL | Age: 87
Setting detail: THERAPIES SERIES
Discharge: HOME | End: 2022-03-23
Attending: FAMILY MEDICINE
Payer: OTHER MISCELLANEOUS

## 2022-03-23 DIAGNOSIS — M54.41 BILATERAL LOW BACK PAIN WITH RIGHT-SIDED SCIATICA, UNSPECIFIED CHRONICITY: ICD-10-CM

## 2022-03-23 PROCEDURE — 97161 PT EVAL LOW COMPLEX 20 MIN: CPT | Mod: GP

## 2022-03-23 PROCEDURE — 97140 MANUAL THERAPY 1/> REGIONS: CPT | Mod: GP

## 2022-03-23 PROCEDURE — 97012 MECHANICAL TRACTION THERAPY: CPT | Mod: GP
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
## 2022-03-28 ENCOUNTER — HOSPITAL ENCOUNTER (OUTPATIENT)
Dept: PHYSICAL THERAPY | Facility: HOSPITAL | Age: 87
Setting detail: THERAPIES SERIES
Discharge: HOME | End: 2022-03-28
Attending: FAMILY MEDICINE
Payer: OTHER MISCELLANEOUS

## 2022-03-28 PROCEDURE — 97140 MANUAL THERAPY 1/> REGIONS: CPT | Mod: GP,CQ

## 2022-03-28 PROCEDURE — 97012 MECHANICAL TRACTION THERAPY: CPT | Mod: GP,CQ

## 2022-03-30 ENCOUNTER — HOSPITAL ENCOUNTER (OUTPATIENT)
Dept: PHYSICAL THERAPY | Facility: HOSPITAL | Age: 87
Setting detail: THERAPIES SERIES
Discharge: HOME | End: 2022-03-30
Attending: FAMILY MEDICINE
Payer: OTHER MISCELLANEOUS

## 2022-03-30 PROCEDURE — 97012 MECHANICAL TRACTION THERAPY: CPT | Mod: GP,CQ

## 2022-03-30 PROCEDURE — 97140 MANUAL THERAPY 1/> REGIONS: CPT | Mod: GP,CQ

## 2022-04-04 ENCOUNTER — HOSPITAL ENCOUNTER (OUTPATIENT)
Dept: PHYSICAL THERAPY | Facility: HOSPITAL | Age: 87
Setting detail: THERAPIES SERIES
Discharge: HOME | End: 2022-04-04
Attending: FAMILY MEDICINE
Payer: OTHER MISCELLANEOUS

## 2022-04-04 PROCEDURE — 97012 MECHANICAL TRACTION THERAPY: CPT | Mod: GP,CQ

## 2022-04-04 PROCEDURE — 97140 MANUAL THERAPY 1/> REGIONS: CPT | Mod: GP,CQ

## 2022-04-07 ENCOUNTER — HOSPITAL ENCOUNTER (OUTPATIENT)
Dept: PHYSICAL THERAPY | Facility: HOSPITAL | Age: 87
Setting detail: THERAPIES SERIES
Discharge: HOME | End: 2022-04-07
Attending: FAMILY MEDICINE
Payer: OTHER MISCELLANEOUS

## 2022-04-07 PROCEDURE — 97012 MECHANICAL TRACTION THERAPY: CPT | Mod: GP,CQ

## 2022-04-07 PROCEDURE — 97140 MANUAL THERAPY 1/> REGIONS: CPT | Mod: GP,CQ

## 2022-04-13 ENCOUNTER — HOSPITAL ENCOUNTER (OUTPATIENT)
Dept: PHYSICAL THERAPY | Facility: HOSPITAL | Age: 87
Setting detail: THERAPIES SERIES
Discharge: HOME | End: 2022-04-13
Attending: FAMILY MEDICINE
Payer: OTHER MISCELLANEOUS

## 2022-04-13 PROCEDURE — 97012 MECHANICAL TRACTION THERAPY: CPT | Mod: GP

## 2022-04-18 ENCOUNTER — HOSPITAL ENCOUNTER (OUTPATIENT)
Dept: PHYSICAL THERAPY | Facility: HOSPITAL | Age: 87
Setting detail: THERAPIES SERIES
Discharge: HOME | End: 2022-04-18
Attending: FAMILY MEDICINE
Payer: OTHER MISCELLANEOUS

## 2022-04-18 PROCEDURE — 97140 MANUAL THERAPY 1/> REGIONS: CPT | Mod: GP,CQ

## 2022-04-18 PROCEDURE — 97012 MECHANICAL TRACTION THERAPY: CPT | Mod: GP,CQ

## 2022-04-21 ENCOUNTER — HOSPITAL ENCOUNTER (OUTPATIENT)
Dept: PHYSICAL THERAPY | Facility: HOSPITAL | Age: 87
Setting detail: THERAPIES SERIES
Discharge: HOME | End: 2022-04-21
Attending: FAMILY MEDICINE
Payer: OTHER MISCELLANEOUS

## 2022-04-21 PROCEDURE — 97012 MECHANICAL TRACTION THERAPY: CPT | Mod: GP,CQ

## 2022-04-21 PROCEDURE — 97140 MANUAL THERAPY 1/> REGIONS: CPT | Mod: GP,CQ

## 2022-04-25 ENCOUNTER — HOSPITAL ENCOUNTER (OUTPATIENT)
Dept: PHYSICAL THERAPY | Facility: HOSPITAL | Age: 87
Setting detail: THERAPIES SERIES
Discharge: HOME | End: 2022-04-25
Attending: FAMILY MEDICINE
Payer: OTHER MISCELLANEOUS

## 2022-04-25 PROCEDURE — 97012 MECHANICAL TRACTION THERAPY: CPT | Mod: GP,CQ

## 2022-04-25 PROCEDURE — 97140 MANUAL THERAPY 1/> REGIONS: CPT | Mod: GP,CQ

## 2022-04-27 DIAGNOSIS — M54.41 BILATERAL LOW BACK PAIN WITH RIGHT-SIDED SCIATICA, UNSPECIFIED CHRONICITY: ICD-10-CM

## 2022-04-27 RX ORDER — TRAMADOL HYDROCHLORIDE 50 MG/1
TABLET ORAL
Qty: 90 TABLET | Refills: 0 | Status: SHIPPED | OUTPATIENT
Start: 2022-04-27 | End: 2022-06-13

## 2022-04-27 NOTE — TELEPHONE ENCOUNTER
Tramadol      Last Written Prescription Date:  3/8/22  Last Fill Quantity: 90,   # refills: 0  Last Office Visit: 3/8/22  Future Office visit:

## 2022-04-28 ENCOUNTER — HOSPITAL ENCOUNTER (OUTPATIENT)
Dept: PHYSICAL THERAPY | Facility: HOSPITAL | Age: 87
Setting detail: THERAPIES SERIES
Discharge: HOME | End: 2022-04-28
Attending: FAMILY MEDICINE
Payer: OTHER MISCELLANEOUS

## 2022-04-28 PROCEDURE — 97012 MECHANICAL TRACTION THERAPY: CPT | Mod: GP

## 2022-05-11 NOTE — PATIENT INSTRUCTIONS
F/u with ongoing concerns.   
lumbar disc displacement  CAPRINI SCORE    AGE RELATED RISK FACTORS                                                       MOBILITY RELATED FACTORS  [ ] Age 41-60 years                                            (1 Point)                  [ ] Bed rest                                                        (1 Point)  [ ] Age: 61-74 years                                           (2 Points)                [ ] Plaster cast                                                   (2 Points)  [ ] Age= 75 years                                              (3 Points)                 [ ] Bed bound for more than 72 hours                   (2 Points)    DISEASE RELATED RISK FACTORS                                               GENDER SPECIFIC FACTORS  [ ] Edema in the lower extremities                       (1 Point)                  [ ] Pregnancy                                                     (1 Point)  [ ] Varicose veins                                               (1 Point)                  [ ] Post-partum < 6 weeks                                   (1 Point)             x[ ] BMI > 25 Kg/m2                                            (1 Point)                  [ ] Hormonal therapy  or oral contraception            (1 Point)                 [ ] Sepsis (in the previous month)                        (1 Point)                  [ ] History of pregnancy complications  [ ] Pneumonia or serious lung disease                                               [ ] Unexplained or recurrent                       (1 Point)           (in the previous month)                               (1 Point)  [ ] Abnormal pulmonary function test                     (1 Point)                 SURGERY RELATED RISK FACTORS  [ ] Acute myocardial infarction                              (1 Point)                 [ ]  Section                                            (1 Point)  [ ] Congestive heart failure (in the previous month)  (1 Point)                 [ ] Minor surgery                                                 (1 Point)   [ ] Inflammatory bowel disease                             (1 Point)                 [x ] Arthroscopic surgery                                        (2 Points)  [ ] Central venous access                                    (2 Points)                [ ] General surgery lasting more than 45 minutes   (2 Points)       [ ] Stroke (in the previous month)                          (5 Points)               [ ] Elective arthroplasty                                        (5 Points)                                                                                                                                               HEMATOLOGY RELATED FACTORS                                                 TRAUMA RELATED RISK FACTORS  [ ] Prior episodes of VTE                                     (3 Points)                 [ ] Fracture of the hip, pelvis, or leg                       (5 Points)  [ ] Positive family history for VTE                         (3 Points)                 [ ] Acute spinal cord injury (in the previous month)  (5 Points)  [ ] Prothrombin 95495 A                                      (3 Points)                 [ ] Paralysis  (less than 1 month)                          (5 Points)  [ ] Factor V Leiden                                             (3 Points)                 [ ] Multiple Trauma within 1 month                         (5 Points)  [ ] Lupus anticoagulants                                     (3 Points)                                                           [ ] Anticardiolipin antibodies                                (3 Points)                                                       [ ] High homocysteine in the blood                      (3 Points)                                             [ ] Other congenital or acquired thrombophilia       (3 Points)                                                [ ] Heparin induced thrombocytopenia                  (3 Points)                                          Total Score [      3    ]  Caprini Score 0-2: Low risk, No VTE Prophylaxis required for most patient's, encourage ambulation  Caprini Score 3-6: At Risk, Pharmacologic VTE prophylaxis is indicated for most patients ( in the absence of a contraindication)  Caprini Score Greater than or = 7: High Risk , pharmacologic VTE is indicated for most patients ( in the absence of a contraindication)    Caprini score indicates that the patient is high risk for VTE event ( score 6 or greater). Surgical patient's in this group will benefit from both pharmacologic prophylaxis and intermittent compression devices . Surgical team will determine the balance between VTE  risk and bleeding risk and other clinical considerations

## 2022-06-07 ENCOUNTER — OFFICE VISIT (OUTPATIENT)
Dept: CHIROPRACTIC MEDICINE | Facility: OTHER | Age: 87
End: 2022-06-07
Attending: CHIROPRACTOR
Payer: OTHER MISCELLANEOUS

## 2022-06-07 DIAGNOSIS — M99.02 SEGMENTAL AND SOMATIC DYSFUNCTION OF THORACIC REGION: ICD-10-CM

## 2022-06-07 DIAGNOSIS — M54.50 ACUTE BILATERAL LOW BACK PAIN WITHOUT SCIATICA: ICD-10-CM

## 2022-06-07 DIAGNOSIS — M99.03 SEGMENTAL AND SOMATIC DYSFUNCTION OF LUMBAR REGION: Primary | ICD-10-CM

## 2022-06-07 PROCEDURE — 98941 CHIROPRACT MANJ 3-4 REGIONS: CPT | Mod: AT | Performed by: CHIROPRACTOR

## 2022-06-13 DIAGNOSIS — M54.41 BILATERAL LOW BACK PAIN WITH RIGHT-SIDED SCIATICA, UNSPECIFIED CHRONICITY: ICD-10-CM

## 2022-06-13 RX ORDER — TRAMADOL HYDROCHLORIDE 50 MG/1
TABLET ORAL
Qty: 90 TABLET | Refills: 0 | Status: SHIPPED | OUTPATIENT
Start: 2022-06-13 | End: 2022-07-27

## 2022-06-17 ENCOUNTER — TELEPHONE (OUTPATIENT)
Dept: FAMILY MEDICINE | Facility: OTHER | Age: 87
End: 2022-06-17

## 2022-06-17 NOTE — TELEPHONE ENCOUNTER
10:44 AM    Reason for Call: OVERBOOK    Patient is having the following symptoms: trouble with hemorroids. Unable to urinate or have a bowel movement in the mornings, would like an afternoon apt. Would like to discuss getting a referral.    The patient is requesting an appointment for within the next couple weeks with Dr Bernal.    Was an appointment offered for this call? No  If yes : Appointment type              Date    Preferred method for responding to this message: Telephone Call  What is your phone number ?[phone]    If we cannot reach you directly, may we leave a detailed response at the number you provided? Yes    Can this message wait until your PCP/provider returns, if unavailable today? Not applicable    Lucy Marr

## 2022-06-22 NOTE — TELEPHONE ENCOUNTER
Patient declined appointment for 6/28 at 8:30 AM, patient states that he has a hard time getting out of bed in the morning and that he would need something around or after 10:30.  Can you please advise if there are any other appointment options for patient and I can call him back.    Thank you

## 2022-06-28 ENCOUNTER — OFFICE VISIT (OUTPATIENT)
Dept: FAMILY MEDICINE | Facility: OTHER | Age: 87
End: 2022-06-28
Attending: FAMILY MEDICINE
Payer: COMMERCIAL

## 2022-06-28 VITALS
TEMPERATURE: 97.3 F | HEART RATE: 107 BPM | BODY MASS INDEX: 29.63 KG/M2 | WEIGHT: 185 LBS | OXYGEN SATURATION: 97 % | SYSTOLIC BLOOD PRESSURE: 128 MMHG | DIASTOLIC BLOOD PRESSURE: 74 MMHG

## 2022-06-28 DIAGNOSIS — N40.1 BENIGN PROSTATIC HYPERPLASIA (BPH) WITH STRAINING ON URINATION: Primary | ICD-10-CM

## 2022-06-28 DIAGNOSIS — R39.16 BENIGN PROSTATIC HYPERPLASIA (BPH) WITH STRAINING ON URINATION: Primary | ICD-10-CM

## 2022-06-28 DIAGNOSIS — K64.2 BLEEDING GRADE III HEMORRHOIDS: ICD-10-CM

## 2022-06-28 LAB
BASOPHILS # BLD AUTO: 0.1 10E3/UL (ref 0–0.2)
BASOPHILS NFR BLD AUTO: 1 %
EOSINOPHIL # BLD AUTO: 0.2 10E3/UL (ref 0–0.7)
EOSINOPHIL NFR BLD AUTO: 3 %
ERYTHROCYTE [DISTWIDTH] IN BLOOD BY AUTOMATED COUNT: 15.8 % (ref 10–15)
HCT VFR BLD AUTO: 39.7 % (ref 40–53)
HGB BLD-MCNC: 12.9 G/DL (ref 13.3–17.7)
LYMPHOCYTES # BLD AUTO: 1.8 10E3/UL (ref 0.8–5.3)
LYMPHOCYTES NFR BLD AUTO: 19 %
MCH RBC QN AUTO: 27 PG (ref 26.5–33)
MCHC RBC AUTO-ENTMCNC: 32.5 G/DL (ref 31.5–36.5)
MCV RBC AUTO: 83 FL (ref 78–100)
MONOCYTES # BLD AUTO: 0.9 10E3/UL (ref 0–1.3)
MONOCYTES NFR BLD AUTO: 10 %
NEUTROPHILS # BLD AUTO: 6.3 10E3/UL (ref 1.6–8.3)
NEUTROPHILS NFR BLD AUTO: 67 %
PLATELET # BLD AUTO: 463 10E3/UL (ref 150–450)
RBC # BLD AUTO: 4.78 10E6/UL (ref 4.4–5.9)
WBC # BLD AUTO: 9.3 10E3/UL (ref 4–11)

## 2022-06-28 PROCEDURE — 85025 COMPLETE CBC W/AUTO DIFF WBC: CPT | Mod: ZL | Performed by: FAMILY MEDICINE

## 2022-06-28 PROCEDURE — 99214 OFFICE O/P EST MOD 30 MIN: CPT | Performed by: FAMILY MEDICINE

## 2022-06-28 PROCEDURE — 36415 COLL VENOUS BLD VENIPUNCTURE: CPT | Mod: ZL | Performed by: FAMILY MEDICINE

## 2022-06-28 PROCEDURE — G0463 HOSPITAL OUTPT CLINIC VISIT: HCPCS

## 2022-06-28 RX ORDER — DUTASTERIDE 0.5 MG/1
0.5 CAPSULE, LIQUID FILLED ORAL DAILY
Qty: 90 CAPSULE | Refills: 3 | Status: SHIPPED | OUTPATIENT
Start: 2022-06-28 | End: 2022-07-29

## 2022-06-28 ASSESSMENT — PAIN SCALES - GENERAL: PAINLEVEL: MILD PAIN (2)

## 2022-06-28 NOTE — PROGRESS NOTES
"  Assessment & Plan     Benign prostatic hyperplasia (BPH) with straining on urination  Worsening.  Add avodart and ask Dr. Dupont to see and recommend.  Thanks to him.    - dutasteride (AVODART) 0.5 MG capsule; Take 1 capsule (0.5 mg) by mouth daily  - Adult Urology Referral; Future    Bleeding grade III hemorrhoids  For years, but worsening.  He has the anusol.  He works on constipation all the time with the ultram.  Getting cbc with the bleeding, [facility] baseline [facility], and asking Dr. Bailey to see and recommend as well.    - CBC with platelets and differential; Future  - Adult General Surg Referral             BMI:   Estimated body mass index is 29.63 kg/m  as calculated from the following:    Height as of 3/8/22: 1.683 m (5' 6.25\").    Weight as of this encounter: 83.9 kg (185 lb).           No follow-ups on file.    Eric Bernal MD  [facility]   Aaron is a 88 year old, presenting for the following health issues:  Rectal Problem      HPI     Hemorrhoids       Duration: 1 month     Description (location/character/radiation): hemorrhoids     Intensity:  moderate    Accompanying signs and symptoms: painful, bleeding     History (similar episodes/previous evaluation): None    Precipitating or alleviating factors: None    Therapies tried and outcome: preparation h, suppository, cream             Review of Systems   Constitutional, HEENT, cardiovascular, pulmonary, gi and gu systems are negative, except as otherwise noted.      Objective    /74   Pulse 107   Temp 97.3  F (36.3  C)   Wt 83.9 kg (185 lb)   SpO2 97%   BMI 29.63 kg/m    Body mass index is 29.63 kg/m .  Physical Exam   GENERAL: healthy, alert and no distress  NECK: no adenopathy, no asymmetry, masses, or scars and thyroid normal to palpation  RESP: lungs clear to auscultation - no rales, rhonchi or wheezes  CV: regular rate and rhythm, normal S1 S2, no S3 or S4, no murmur, click or rub, no peripheral " edema and peripheral pulses strong  ABDOMEN: soft, nontender, no hepatosplenomegaly, no masses and bowel sounds normal   (male): nontender around the groin and area of pain (around the underside of the scrotum.    MS: no gross musculoskeletal defects noted, no edema    I didn't repeat rectal at this time.      Cbc stable.             .  ..

## 2022-06-28 NOTE — NURSING NOTE
"Chief Complaint   Patient presents with     Rectal Problem       Initial /74   Pulse 107   Temp 97.3  F (36.3  C)   Wt 83.9 kg (185 lb)   SpO2 97%   BMI 29.63 kg/m   Estimated body mass index is 29.63 kg/m  as calculated from the following:    Height as of 3/8/22: 1.683 m (5' 6.25\").    Weight as of this encounter: 83.9 kg (185 lb).  Medication Reconciliation: complete  Gladis Hernandez LPN  "

## 2022-07-07 ENCOUNTER — OFFICE VISIT (OUTPATIENT)
Dept: SURGERY | Facility: OTHER | Age: 87
End: 2022-07-07
Attending: FAMILY MEDICINE
Payer: COMMERCIAL

## 2022-07-07 VITALS
WEIGHT: 185 LBS | HEART RATE: 111 BPM | DIASTOLIC BLOOD PRESSURE: 82 MMHG | BODY MASS INDEX: 29.73 KG/M2 | TEMPERATURE: 98.2 F | OXYGEN SATURATION: 94 % | SYSTOLIC BLOOD PRESSURE: 158 MMHG | HEIGHT: 66 IN

## 2022-07-07 DIAGNOSIS — K64.2 BLEEDING GRADE III HEMORRHOIDS: ICD-10-CM

## 2022-07-07 PROCEDURE — 99213 OFFICE O/P EST LOW 20 MIN: CPT | Performed by: SURGERY

## 2022-07-07 PROCEDURE — G0463 HOSPITAL OUTPT CLINIC VISIT: HCPCS

## 2022-07-07 ASSESSMENT — PAIN SCALES - GENERAL: PAINLEVEL: NO PAIN (0)

## 2022-07-07 NOTE — NURSING NOTE
"Chief Complaint   Patient presents with     Consult     Rectal bleeding, grade III hemorrhoids       Initial BP (!) 158/82   Pulse 111   Temp 98.2  F (36.8  C)   Ht 1.676 m (5' 6\")   Wt 83.9 kg (185 lb)   SpO2 94%   BMI 29.86 kg/m   Estimated body mass index is 29.86 kg/m  as calculated from the following:    Height as of this encounter: 1.676 m (5' 6\").    Weight as of this encounter: 83.9 kg (185 lb).  Medication Reconciliation: complete  Wandy Monet LPN  "

## 2022-07-07 NOTE — PATIENT INSTRUCTIONS
Thank you for allowing Dr. Bailey and our surgical team to participate in your care. Please call our health unit coordinator at [phone] with scheduling questions or the nurse at [phone] with any other questions or concerns.

## 2022-07-13 NOTE — PROGRESS NOTES
[facility] Surgery Consultation    CC:  Bright red blood per rectum     HPI:  This 88 year old year old male is seen at the request of Dr. Bernal for evaluation of bleeding and perianal itching. He has noted it for many years but seemed to get worse recently when he had seen his PCP. Since he had seen him things have improved somewhat. He denies having to push back in any hemorrhoids. He had a colonoscopy in 2019. His hbg has been relatively stable. He denies bleeding through his pants. He denies issues keeping himself clean.     Past Medical History:   Diagnosis Date     Calculus of gallbladder with other cholecystitis, without mention of obstruction 11/18/2004     Displacement of lumbar intervertebral disc without myelopathy 11/17/2000     Hyperplasia of prostate 01/04/2000     Hypertension, Benign 07/11/2011     Insomnia, unspecified 03/17/2011     Lumbago 11/02/1999     Nonallopathic lesion of cervical region, not elsewhere classified 12/19/2001     Nonallopathic lesion of lumbar region, not elsewhere classified 03/05/2003     Nonallopathic lesion of thoracic region, not elsewhere classified 11/17/2000     Other and unspecified hyperlipidemia 12/14/1999     Other diseases of respiratory system, not elsewhere classified 04/21/2004     Other malaise and fatigue 04/05/2006     Pneumonia, organism unspecified(486) 12/13/1999       Past Surgical History:   Procedure Laterality Date     APPENDECTOMY       cataract extraction and lens implantation  3/2007    LEFT     CHOLECYSTECTOMY       electric stimulator implant       ENDOSCOPY UPPER, COLONOSCOPY, COMBINED N/A 12/5/2019    Procedure: upper endoscopy with biopsy, colonoscopy with biopsy;  Surgeon: Zachariah Bailey MD;  Location: [facility]     excision of Dupuytren's contracture extending into ring finger  02/08/2008    Bilateral     inguinal herniography      LEFT     NEPHRECTOMY  1992    RIGHT > Renal Cancer     OPEN REDUCTION INTERNAL FIXATION ANKLE      RIGHT  "    penile implant       TURP         Allergies   Allergen Reactions     Chlorzoxazone      Mikhail Meyer       Current Outpatient Medications   Medication     acetaminophen (TYLENOL) 500 MG tablet     albuterol (PROAIR HFA/PROVENTIL HFA/VENTOLIN HFA) 108 (90 Base) MCG/ACT inhaler     aspirin 81 MG tablet     busPIRone (BUSPAR) 10 MG tablet     calcium carbonate (TUMS) 500 MG chewable tablet     cetirizine (ZYRTEC) 10 MG tablet     Cholecalciferol (VITAMIN D3) 2000 UNITS CAPS     cyclobenzaprine (FLEXERIL) 10 MG tablet     dutasteride (AVODART) 0.5 MG capsule     famotidine (PEPCID) 40 MG tablet     fluticasone (FLONASE) 50 MCG/ACT nasal spray     guaiFENesin (MUCINEX) 600 MG 12 hr tablet     hydrocortisone (ANUSOL-HC) 25 MG suppository     Omega-3 Fatty Acids (FISH OIL) 1200 MG CAPS     omeprazole 20 MG tablet     senna (SENOKOT) 8.6 MG tablet     SIMETHICONE-80 PO     tamsulosin (FLOMAX) 0.4 MG capsule     temazepam (RESTORIL) 30 MG capsule     traMADol (ULTRAM) 50 MG tablet     TRIAMCINOLONE ACETONIDE EX     No current facility-administered medications for this visit.       HABITS:    Social History     Tobacco Use     Smoking status: Former Smoker     Packs/day: 2.00     Years: 26.00     Pack years: 52.00     Types: Cigarettes     Start date: 1949     Quit date: 1975     Years since quittin.4     Smokeless tobacco: Never Used     Tobacco comment: Tried to Quit (YES)   Substance Use Topics     Alcohol use: Yes     Comment: RARELY     Drug use: No       Family History   Problem Relation Age of Onset     Diabetes Brother      C.A.D. Mother      Myocardial Infarction Mother      Heart Disease Mother         Heart Disease     Cancer Sister         Leukemia     Other - See Comments Other         Colitis       REVIEW OF SYSTEMS:  Ten point review of systems negative except those mentioned in the HPI.     OBJECTIVE:    BP (!) 158/82   Pulse 111   Temp 98.2  F (36.8  C)   Ht 1.676 m (5' 6\")   Wt 83.9 kg " (185 lb)   SpO2 94%   BMI 29.86 kg/m      GENERAL: Generally appears well, in no distress with appropriate affect.  HEENT:   Sclerae anicteric - normocephalic atraumatic   Respiratory:  No acute distress, no splinting   Cardiovascular:  Tachy rate   Abdomen: There is a enlarged hemorrhoid in the posterior left, internal  :  deferred  Extremities:  Extremities normal. No deformities, edema, or skin discoloration.  Skin:  no suspicious lesions or rashes  Neurological: grossly intact  Psych:  Alert, oriented, affect appropriate with normal decision making ability.    IMPRESSION:    88 y.o. male with presents with concerns for worsening of bleeding hemorrhoid. He appears to have likely a grade three internal hemorrhoid on the posterior left side. This likely becomes irritated when having bowel movements. Would like to trial non-operative management with fiber supplementation and increase water. He believes things are improving and would like to do this as well.     PLAN:    Increase fiber to 30-35 g/ day.   Increase water     Zachariah Bailey MD,     7/13/2022  3:08 PM

## 2022-07-27 DIAGNOSIS — M54.41 BILATERAL LOW BACK PAIN WITH RIGHT-SIDED SCIATICA, UNSPECIFIED CHRONICITY: ICD-10-CM

## 2022-07-27 RX ORDER — TRAMADOL HYDROCHLORIDE 50 MG/1
TABLET ORAL
Qty: 90 TABLET | Refills: 0 | Status: SHIPPED | OUTPATIENT
Start: 2022-07-27 | End: 2022-09-12

## 2022-07-27 NOTE — TELEPHONE ENCOUNTER
TRAMADOL 50MG TABLETS       Last Written Prescription Date:  6/13/22  Last Fill Quantity: 90,   # refills: 0  Last Office Visit: 6/28/22  Future Office visit:    Next 5 appointments (look out 90 days)    Oct 25, 2022 10:45 AM  (Arrive by 10:30 AM)  [facility] [address] [facility] [address]  [phone]           Routing refill request to provider for review/approval because:  Drug not on the [facility], [facility] or [facility] refill protocol or controlled substance

## 2022-07-29 DIAGNOSIS — R39.16 BENIGN PROSTATIC HYPERPLASIA (BPH) WITH STRAINING ON URINATION: ICD-10-CM

## 2022-07-29 DIAGNOSIS — N40.1 BENIGN PROSTATIC HYPERPLASIA (BPH) WITH STRAINING ON URINATION: ICD-10-CM

## 2022-07-29 RX ORDER — DUTASTERIDE 0.5 MG/1
0.5 CAPSULE, LIQUID FILLED ORAL DAILY
Qty: 90 CAPSULE | Refills: 3 | Status: SHIPPED | OUTPATIENT
Start: 2022-07-29 | End: 2022-09-20

## 2022-07-29 NOTE — TELEPHONE ENCOUNTER
Call from patient requesting to have dutasteride sent to [facility]. Rx was sent to [facility] on 6/28/22 for #90 capsules with 3 refills.     [facility]

## 2022-08-17 NOTE — NURSING NOTE
"Chief Complaint   Patient presents with     Throat Problem     Pt states he has alot of phlegm, trouble swallowing, trouble breathing when laying on back       Initial /66  Pulse 91  Temp 97.7  F (36.5  C)  Ht 5' 6.5\" (1.689 m)  Wt 195 lb (88.5 kg)  SpO2 93%  BMI 31 kg/m2 Estimated body mass index is 31 kg/(m^2) as calculated from the following:    Height as of this encounter: 5' 6.5\" (1.689 m).    Weight as of this encounter: 195 lb (88.5 kg).  Medication Reconciliation: complete   Gladis Hernandez    " How Many Mls Were Removed From The 40 Mg/Ml (10ml) Vial When Preparing The Injectable Solution?: 0 Medical Necessity Clause: This procedure was medically necessary because the lesions that were treated were: Lot # For Kenalog (Optional): [id number] Administered By (Optional): Saige Mandel DO Detail Level: Detailed Include Z78.9 (Other Specified Conditions Influencing Health Status) As An Associated Diagnosis?: No Ndc# For Kenalog Only: 1688-0945-14 Expiration Date For Kenalog (Optional): FEB 2023 Show Inventory Tab: Hide Total Volume (Ccs): 0.5 Concentration Of Kenalog Solution Injected (Mg/Ml): 40.0 Kenalog Preparation: Kenalog Validate Note Data When Using Inventory: Yes Consent: The risks of atrophy were reviewed with the patient.

## 2022-09-12 DIAGNOSIS — M54.41 BILATERAL LOW BACK PAIN WITH RIGHT-SIDED SCIATICA, UNSPECIFIED CHRONICITY: ICD-10-CM

## 2022-09-12 RX ORDER — TRAMADOL HYDROCHLORIDE 50 MG/1
TABLET ORAL
Qty: 90 TABLET | Refills: 0 | Status: SHIPPED | OUTPATIENT
Start: 2022-09-12 | End: 2022-10-25

## 2022-09-15 ENCOUNTER — TRANSFERRED RECORDS (OUTPATIENT)
Dept: HEALTH INFORMATION MANAGEMENT | Facility: CLINIC | Age: 87
End: 2022-09-15

## 2022-09-15 LAB — RETINOPATHY: NORMAL

## 2022-09-16 ENCOUNTER — TELEPHONE (OUTPATIENT)
Dept: FAMILY MEDICINE | Facility: OTHER | Age: 87
End: 2022-09-16

## 2022-09-16 NOTE — TELEPHONE ENCOUNTER
Fax  [phone]    [facility] stating why the patient need's the dutasteride faxed to them    dutasteride (AVODART) 0.5 MG capsule      Last Written Prescription Date:  7/29/22  Last Fill Quantity: 90,   # refills: 3  Last Office Visit: 6/28/22  Future Office visit:    Next 5 appointments (look out 90 days)    Oct 25, 2022 10:45 AM  (Arrive by 10:30 AM)  [facility] with Eric Bernal MD  [facility] [address] [facility] [address]  [phone]           Routing refill request to provider for review/approval because:  Drug not on the [facility], [facility] or [facility] refill protocol or controlled substance

## 2022-09-19 ENCOUNTER — TRANSFERRED RECORDS (OUTPATIENT)
Dept: HEALTH INFORMATION MANAGEMENT | Facility: CLINIC | Age: 87
End: 2022-09-19

## 2022-09-19 ENCOUNTER — OFFICE VISIT (OUTPATIENT)
Dept: CHIROPRACTIC MEDICINE | Facility: OTHER | Age: 87
End: 2022-09-19
Attending: CHIROPRACTOR
Payer: OTHER MISCELLANEOUS

## 2022-09-19 DIAGNOSIS — M99.03 SEGMENTAL AND SOMATIC DYSFUNCTION OF LUMBAR REGION: Primary | ICD-10-CM

## 2022-09-19 DIAGNOSIS — M54.50 ACUTE BILATERAL LOW BACK PAIN WITHOUT SCIATICA: ICD-10-CM

## 2022-09-19 DIAGNOSIS — M99.02 SEGMENTAL AND SOMATIC DYSFUNCTION OF THORACIC REGION: ICD-10-CM

## 2022-09-19 PROCEDURE — 98941 CHIROPRACT MANJ 3-4 REGIONS: CPT | Mod: AT | Performed by: CHIROPRACTOR

## 2022-09-20 ENCOUNTER — TELEPHONE (OUTPATIENT)
Dept: FAMILY MEDICINE | Facility: OTHER | Age: 87
End: 2022-09-20

## 2022-09-20 DIAGNOSIS — R39.16 BENIGN PROSTATIC HYPERPLASIA (BPH) WITH STRAINING ON URINATION: Primary | ICD-10-CM

## 2022-09-20 DIAGNOSIS — N40.1 BENIGN PROSTATIC HYPERPLASIA (BPH) WITH STRAINING ON URINATION: Primary | ICD-10-CM

## 2022-09-20 RX ORDER — FINASTERIDE 5 MG/1
5 TABLET, FILM COATED ORAL DAILY
Qty: 90 TABLET | Refills: 3 | Status: SHIPPED | OUTPATIENT
Start: 2022-09-20

## 2022-09-20 NOTE — TELEPHONE ENCOUNTER
11:18 AM    Reason for Call: OVERBOOK    Patient is having the following symptoms: Kenyon needs a work comp appointment for his back. He thinks  He needs some PT again      The patient is requesting an appointment for ASAP with Dr. Bernal    Was an appointment offered for this call? No  If yes : Appointment type              Date    Preferred method for responding to this message: Telephone Call  What is your phone number ?  [phone]    If we cannot reach you directly, may we leave a detailed response at the number you provided? Yes    Can this message wait until your PCP/provider returns, if unavailable today? YES,     Sarah Smallwood

## 2022-09-20 NOTE — TELEPHONE ENCOUNTER
[facility] that [facility]ide is not covered and prefers finasteride.  Rx pended if you agree.

## 2022-09-27 ENCOUNTER — TRANSFERRED RECORDS (OUTPATIENT)
Dept: HEALTH INFORMATION MANAGEMENT | Facility: CLINIC | Age: 87
End: 2022-09-27

## 2022-10-03 NOTE — PROGRESS NOTES
"  Assessment & Plan     Type 2 diabetes mellitus without complication, without long-term current use of insulin (H)  Ongoing.  Update labs when due later this month for his physical.  I cancelled them today otherwise.    - Basic metabolic panel; Future  - Hemoglobin A1c; Future  - TSH with free T4 reflex; Future    Gastroesophageal reflux disease without esophagitis  Not controlled. Increase omeprazole to bid for the next 3 months and try and calm this down.  Famotidine no help at all.  Follow with ongoing concerns and readdress at his physical.    - omeprazole (PRILOSEC) 40 MG DR capsule; Take 1 capsule (40 mg) by mouth 2 times daily             BMI:   Estimated body mass index is 30.02 kg/m  as calculated from the following:    Height as of 7/7/22: 1.676 m (5' 6\").    Weight as of this encounter: 84.4 kg (186 lb).           No follow-ups on file.    Eric Bernal MD  [facility]    Toyin Walker is a 88 year old, presenting for the following health issues:  Gastrophageal Reflux      HPI     Medication Followup of pepcid    Taking Medication as prescribed: yes    Side Effects:  None    Medication Helping Symptoms:  NO            Diabetes Follow-up      How often are you checking your blood sugar? Not at all    What concerns do you have today about your diabetes? None     Do you have any of these symptoms? (Select all that apply)  Numbness in feet    Have you had a diabetic eye exam in the last 12 months? Yes- Date of last eye exam: 2 weeks ago,  Location: [facility]        BP Readings from Last 2 Encounters:   10/04/22 138/64   07/07/22 (!) 158/82     Hemoglobin A1C (%)   Date Value   03/08/2022 6.4 (H)   11/15/2021 6.3 (H)   02/16/2021 6.1 (H)   11/12/2020 6.4 (H)     LDL Cholesterol Calculated (mg/dL)   Date Value   11/15/2021 113 (H)   11/12/2020 87   11/01/2019 112 (H)             Review of Systems   Constitutional, HEENT, cardiovascular, pulmonary, gi and gu systems are " negative, except as otherwise noted.      Objective    /64   Pulse 108   Wt 84.4 kg (186 lb)   SpO2 96%   BMI 30.02 kg/m    Body mass index is 30.02 kg/m .  Physical Exam   GENERAL: healthy, alert and no distress  NECK: no adenopathy, no asymmetry, masses, or scars and thyroid normal to palpation  RESP: lungs clear to auscultation - no rales, rhonchi or wheezes  CV: regular rate and rhythm, normal S1 S2, no S3 or S4, no murmur, click or rub, no peripheral edema and peripheral pulses strong  ABDOMEN: soft, nontender, no hepatosplenomegaly, no masses and bowel sounds normal  MS: no gross musculoskeletal defects noted, no edema

## 2022-10-03 NOTE — PROGRESS NOTES
Occupational Visit     SUBJECTIVE:  Kenyon Rivera, 88 year old, male is seen for follow up of occupational injury. Date of injury is 5/31/1975.    Linked Episodes   Type: Episode: Status: Noted: Resolved: Last update: Updated by:   WORK COMP [employer] Active 5/31/1975  10/3/2022  4:27 PM Orly Domingo LPN      Comments:       Musculoskeletal problem/pain       Duration: 5/31/1975    Description  Location: low back     Intensity:  moderate    Accompanying signs and symptoms: radiation of pain to bilateral leg, numbness in bilateral legs     History  Previous similar problem: YES  Previous evaluation:  x-ray and MRI    Precipitating or alleviating factors:  Trauma or overuse: YES- work injury  Aggravating factors include: none    Therapies tried and outcome: acetaminophen and Ibuprofen and tramadol         Allergies   Allergen Reactions     Chlorzoxazone      Parafon Forte         Review of Systems:  Constitutional, HEENT, cardiovascular, pulmonary, gi and gu systems are negative, except as otherwise noted.      OBJECTIVE:  Vitals:    10/04/22 0928   BP: 138/64   Pulse: 108   SpO2: 96%               Exam:  Walking with limp favoring back.  Rises slowly from chair.        Labs: No results found for this or any previous visit (from the past 24 hour(s)).      ASSESSMENT/PLAN:  (M54.41) Bilateral low back pain with right-sided sciatica, unspecified chronicity  (primary encounter diagnosis)  Comment: ongoing, severe at times.    Plan: Physical Therapy Referral        Reliable use of the ultram.  He tries to minimize.  Referral sent for PT.  Please fax as below.      (Y39.58) Chronic, continuous use of opioids  Comment: reliable patient.    Plan: continue to follow.  PDMP reviewed [facility] and the ultram from me and that is it.  He understands risks of his meds very well and does quite well overall without any side effects.      Contact for work comp approvals is Amrit Willett.  Phone  [phone] and fax number is [phone].  We need to fax any requests such as this PT referral to him for approval.

## 2022-10-04 ENCOUNTER — OFFICE VISIT (OUTPATIENT)
Dept: FAMILY MEDICINE | Facility: OTHER | Age: 87
End: 2022-10-04
Attending: FAMILY MEDICINE
Payer: MEDICARE

## 2022-10-04 ENCOUNTER — OFFICE VISIT (OUTPATIENT)
Dept: FAMILY MEDICINE | Facility: OTHER | Age: 87
End: 2022-10-04
Attending: FAMILY MEDICINE
Payer: OTHER MISCELLANEOUS

## 2022-10-04 VITALS
DIASTOLIC BLOOD PRESSURE: 64 MMHG | WEIGHT: 186 LBS | BODY MASS INDEX: 30.02 KG/M2 | SYSTOLIC BLOOD PRESSURE: 138 MMHG | OXYGEN SATURATION: 96 % | HEART RATE: 108 BPM

## 2022-10-04 VITALS
SYSTOLIC BLOOD PRESSURE: 138 MMHG | HEART RATE: 108 BPM | BODY MASS INDEX: 30.02 KG/M2 | WEIGHT: 186 LBS | DIASTOLIC BLOOD PRESSURE: 64 MMHG | OXYGEN SATURATION: 96 %

## 2022-10-04 DIAGNOSIS — F11.90 CHRONIC, CONTINUOUS USE OF OPIOIDS: Chronic | ICD-10-CM

## 2022-10-04 DIAGNOSIS — E11.9 TYPE 2 DIABETES MELLITUS WITHOUT COMPLICATION, WITHOUT LONG-TERM CURRENT USE OF INSULIN (H): Primary | ICD-10-CM

## 2022-10-04 DIAGNOSIS — K21.9 GASTROESOPHAGEAL REFLUX DISEASE WITHOUT ESOPHAGITIS: ICD-10-CM

## 2022-10-04 DIAGNOSIS — M54.41 BILATERAL LOW BACK PAIN WITH RIGHT-SIDED SCIATICA, UNSPECIFIED CHRONICITY: Primary | ICD-10-CM

## 2022-10-04 PROCEDURE — 2894A PR VOIDCORRECT: CPT | Performed by: FAMILY MEDICINE

## 2022-10-04 PROCEDURE — G0463 HOSPITAL OUTPT CLINIC VISIT: HCPCS

## 2022-10-04 PROCEDURE — 99213 OFFICE O/P EST LOW 20 MIN: CPT | Performed by: FAMILY MEDICINE

## 2022-10-04 PROCEDURE — 99214 OFFICE O/P EST MOD 30 MIN: CPT | Performed by: FAMILY MEDICINE

## 2022-10-04 RX ORDER — OMEPRAZOLE 40 MG/1
40 CAPSULE, DELAYED RELEASE ORAL 2 TIMES DAILY
Qty: 90 CAPSULE | Refills: 3 | Status: SHIPPED | OUTPATIENT
Start: 2022-10-04 | End: 2022-11-15

## 2022-10-04 ASSESSMENT — ANXIETY QUESTIONNAIRES
5. BEING SO RESTLESS THAT IT IS HARD TO SIT STILL: NOT AT ALL
6. BECOMING EASILY ANNOYED OR IRRITABLE: NOT AT ALL
2. NOT BEING ABLE TO STOP OR CONTROL WORRYING: NOT AT ALL
GAD7 TOTAL SCORE: 3
7. FEELING AFRAID AS IF SOMETHING AWFUL MIGHT HAPPEN: NOT AT ALL
1. FEELING NERVOUS, ANXIOUS, OR ON EDGE: SEVERAL DAYS
GAD7 TOTAL SCORE: 3
3. WORRYING TOO MUCH ABOUT DIFFERENT THINGS: SEVERAL DAYS

## 2022-10-04 ASSESSMENT — PATIENT HEALTH QUESTIONNAIRE - PHQ9
5. POOR APPETITE OR OVEREATING: SEVERAL DAYS
SUM OF ALL RESPONSES TO PHQ QUESTIONS 1-9: 2

## 2022-10-04 ASSESSMENT — PAIN SCALES - GENERAL
PAINLEVEL: MODERATE PAIN (5)
PAINLEVEL: MODERATE PAIN (5)

## 2022-10-04 NOTE — NURSING NOTE
"Chief Complaint   Patient presents with     Gastrophageal Reflux       Initial /64   Pulse 108   Wt 84.4 kg (186 lb)   SpO2 96%   BMI 30.02 kg/m   Estimated body mass index is 30.02 kg/m  as calculated from the following:    Height as of 7/7/22: 1.676 m (5' 6\").    Weight as of this encounter: 84.4 kg (186 lb).  Medication Reconciliation: complete  Gladis Hernandez LPN  "

## 2022-10-04 NOTE — NURSING NOTE
"Chief Complaint   Patient presents with     Work Comp       Initial /64   Pulse 108   Wt 84.4 kg (186 lb)   SpO2 96%   BMI 30.02 kg/m   Estimated body mass index is 30.02 kg/m  as calculated from the following:    Height as of 7/7/22: 1.676 m (5' 6\").    Weight as of this encounter: 84.4 kg (186 lb).  Medication Reconciliation: complete  Gladis Hernandez LPN  "

## 2022-10-13 NOTE — PROGRESS NOTES
"  Assessment & Plan     Type 2 diabetes mellitus without complication, without long-term current use of insulin (H)  Doing great.  Reviewed in detail.  Excellent control.   F/u q 6 months.      Renal insufficiency  Stable creatinine.  [facility]      Bilateral low back pain with right-sided sciatica, unspecified chronicity  Discussed.  Refill due.  Sent.  PDMP stable as well.    - traMADol (ULTRAM) 50 MG tablet; TAKE 1 TABLET(50 MG) BY MOUTH EVERY 8 HOURS AS NEEDED FOR SEVERE PAIN Strength: 50 mg             BMI:   Estimated body mass index is 30.18 kg/m  as calculated from the following:    Height as of 7/7/22: 1.676 m (5' 6\").    Weight as of this encounter: 84.8 kg (187 lb).           No follow-ups on file.    Eric Bernal MD  [facility]   Aaron is a 88 year old, presenting for the following health issues:  Diabetes      HPI     Diabetes Follow-up      How often are you checking your blood sugar? Not at all    What concerns do you have today about your diabetes? None     Do you have any of these symptoms? (Select all that apply)  Numbness in feet      BP Readings from Last 2 Encounters:   10/25/22 136/74   10/04/22 138/64     Hemoglobin A1C (%)   Date Value   10/21/2022 6.2 (H)   03/08/2022 6.4 (H)   02/16/2021 6.1 (H)   11/12/2020 6.4 (H)     LDL Cholesterol Calculated (mg/dL)   Date Value   11/15/2021 113 (H)   11/12/2020 87   11/01/2019 112 (H)               Hypertension Follow-up      Do you check your blood pressure regularly outside of the clinic? Yes     Are you following a low salt diet? Yes    Are your blood pressures ever more than 140 on the top number (systolic) OR more   than 90 on the bottom number (diastolic), for example 140/90? No          Review of Systems   Constitutional, HEENT, cardiovascular, pulmonary, gi and gu systems are negative, except as otherwise noted.      Objective    /74   Pulse 100   Temp 97.5  F (36.4  C)   Wt " 84.8 kg (187 lb)   SpO2 93%   BMI 30.18 kg/m    Body mass index is 30.18 kg/m .  Physical Exam   GENERAL: healthy, alert and no distress  NECK: no adenopathy, no asymmetry, masses, or scars and thyroid normal to palpation  RESP: lungs clear to auscultation - no rales, rhonchi or wheezes  CV: regular rate and rhythm, normal S1 S2, no S3 or S4, no murmur, click or rub, no peripheral edema and peripheral pulses strong  ABDOMEN: soft, nontender, no hepatosplenomegaly, no masses and bowel sounds normal  MS: no gross musculoskeletal defects noted, no edema    Labs reviewed.

## 2022-10-14 ENCOUNTER — HOSPITAL ENCOUNTER (OUTPATIENT)
Dept: PHYSICAL THERAPY | Facility: HOSPITAL | Age: 87
Setting detail: THERAPIES SERIES
Discharge: HOME | End: 2022-10-14
Attending: FAMILY MEDICINE
Payer: OTHER MISCELLANEOUS

## 2022-10-14 DIAGNOSIS — M54.41 BILATERAL LOW BACK PAIN WITH RIGHT-SIDED SCIATICA, UNSPECIFIED CHRONICITY: ICD-10-CM

## 2022-10-14 PROCEDURE — 97110 THERAPEUTIC EXERCISES: CPT | Mod: GP

## 2022-10-14 PROCEDURE — 97161 PT EVAL LOW COMPLEX 20 MIN: CPT | Mod: GP

## 2022-10-17 ENCOUNTER — HOSPITAL ENCOUNTER (OUTPATIENT)
Dept: PHYSICAL THERAPY | Facility: HOSPITAL | Age: 87
Setting detail: THERAPIES SERIES
Discharge: HOME | End: 2022-10-17
Attending: FAMILY MEDICINE
Payer: OTHER MISCELLANEOUS

## 2022-10-17 PROCEDURE — 97140 MANUAL THERAPY 1/> REGIONS: CPT | Mod: GP

## 2022-10-17 PROCEDURE — 97110 THERAPEUTIC EXERCISES: CPT | Mod: GP

## 2022-10-17 NOTE — PROGRESS NOTES
"   10/14/22 0700   General Information   Type of Visit Initial OP Ortho PT Evaluation   Start of Care Date 10/14/22   Referring Physician Dr. Bernal   Patient/Family Goals Statement Pt.would like to decrease his pain allowing him increased ease with his daily activities.   Orders Evaluate and Treat   Date of Order 10/04/22   Medical Diagnosis Bilateral low back pain with right-sided sciatica, unspecified chronicity   Surgical/Medical history reviewed Yes   Precautions/Limitations no known precautions/limitations   General Information Comments Contact for work comp approvals is Amrit Willett.  Phone [phone] and fax number is [phone].  We need to fax any requests such as this PT referral to him for approval.       Present No   Body Part(s)   Body Part(s) Lumbar Spine/SI   Presentation and Etiology   Pertinent history of current problem (include personal factors and/or comorbidities that impact the POC) \"Aaron\" presents to therapy today with low back pain stemming from a work injury 5/31/1975. He reports that as he was cutting down a tree it fell on him resulting in several compression fractures I in his lumbar spine. He reports that his back has hurt ever since even after undergoing a lumbar fusion in 1978. He reports the pain is sharp and aching in his low back and down into his hips. He has experienced sensation loss in his right leg down to his toes. He denies loss of sensation in his left leg instead reporting that it is achy and dull. He has tried, chiropractic, tramadol, and physical therapy for his pain with mixed success. He denies the presence of cauda equina or any other red flag symptoms at this time. PMH does include a right sided JOSE M and lumbar fusion.   Impairments A. Pain;E. Decreased flexibility;F. Decreased strength and endurance;J. Burning;K. Numbness;M. Locking or catching;P. Bowel or bladder problems  (Bowel or bladder problems years ago and related to another " condition.)   Functional Limitations perform activities of daily living;perform desired leisure / sports activities   Symptom Location Pt. reports pain in his low back and into bilateral LE. Right sided sensation loss and numbness to foot.   How/Where did it occur At work  (DOI: 5/31/1975)   Onset date of current episode/exacerbation   (As above)   Chronicity Chronic   Pain quality A. Sharp;C. Aching   Frequency of pain/symptoms A. Constant   Pain/symptoms are: Other   Pain symptoms comment Worse with activity including gardening, laundry, and other daily tasks.   Pain/symptoms exacerbated by C. Lifting;D. Carrying;I. Bending;K. Home tasks   Pain/symptoms eased by I. OTC medication(s);C. Rest;A. Sitting;B. Walking;G. Heat;H. Cold;K. Other  (Prescription medications)   Progression of symptoms since onset: Unchanged   Prior Level of Function   Prior Level of Function-Mobility Independent   Prior Level of Function-ADLs Independent   Current Level of Function   Living environment Apartment/condo   Current equipment-Gait/Locomotion None   Current equipment-ADL None   Fall Risk Screen   Fall screen completed by PT   Have you fallen 2 or more times in the past year? No   Have you fallen and had an injury in the past year? No   Timed Up and Go score (seconds) NT   Is patient a fall risk? No   Abuse Screen (yes response referral indicated)   Feels Unsafe at Home or Work/School no   Feels Threatened by Someone no   Does Anyone Try to Keep You From Having Contact with Others or Doing Things Outside Your Home? no   Physical Signs of Abuse Present no   Presenting problem Back pain from work comp visit   Patient needs abuse support services and resources No   Lumbar Spine/SI Objective Findings   Gait/Locomotion See observation   Balance/Proprioception (Single Leg Stance) Instability bilaterally   Hamstring Flexibility Restricted on right, pain in posterior leg along distribution of sciatic nerve   Hip Flexor Flexibility  Restricted on right Unable to test left due to pain with right sidelying   Quadricep Flexibility Restricted bilaterally   Piriformis Flexibility Restricted bilaterally with reproduction of back/hip pain   Flexion ROM Fingertips to shin pain in low back R>L   Extension ROM Severe limitation with pain in low back   Right Side Bending ROM Fingertips to mid thigh pain in left   Left Side Bending ROM Fingertips to mid thigh   Hip Screen HIP ROM limited particularly in bilateral IR worse on right   Hip Flexion (L2) Strength 4/5 L, 3/5 R   Hip Abduction Strength 3/5 on right unable to test left due to pain in right sidelying   Hip Extension Strength 3/5 B   Knee Extension (L3) Strength 5/5 B   Ankle Dorsiflexion (L4) Strength 4/5 L 3/5 R   Great Toe Extension (L5) Strength 5/5   Ankle Plantar Flexion (S1) Strength 5/5 B   SLR Positive for hamstring tension but failed to increase low back pain   Crossover SLR As above   Segmental Mobility Hypomobile with localized pain to thoracic and lumbar CPA/UPA worse in lower lumbar   Palpation Palpably tender to bilateral piriformis   Slump Test Positive for irritation in sciatic nerve distribution on right   Observation Pt. ambulating with antalgic gait, decreased stride length. Is using his hands on surface for safety per his report.   Sensation Testing Diminished sensation on right in L3-S2   Planned Therapy Interventions   Planned Therapy Interventions gait training;balance training;joint mobilization;manual therapy;neuromuscular re-education;ROM;strengthening;stretching   Planned Modality Interventions   Planned Modality Interventions Cryotherapy;Hot packs;Iontophoresis;TENS;Traction;Ultrasound   Clinical Impression   Criteria for Skilled Therapeutic Interventions Met yes, treatment indicated   PT Diagnosis Pt. presents to therapy with low back and lower extremity pain following a work injury. Clinical findings are consistent with Low back pain with radiating pain likely due to  his past injury and surgical history.   Influenced by the following impairments pain, decreased flexibility, impaired sensation, decreased LE strength, decreased ROM, Decreased functional activity tolerance, gait disturbance.   Functional limitations due to impairments Pt. is unable to perform his desired daily home and recreational activities without reaching 9/10 pain if he overdoes it.   Clinical Presentation Stable/Uncomplicated   Clinical Presentation Rationale Therapist discretion   Clinical Decision Making (Complexity) Low complexity   Therapy Frequency 2 times/Week   Predicted Duration of Therapy Intervention (days/wks) 6 weeks   Risk & Benefits of therapy have been explained Yes   Patient, Family & other staff in agreement with plan of care Yes   Clinical Impression Comments Skilled physical therapy warranted with the use of manual therapy and modalities to decrease pain along with therapeutic exercise to address ROM, functional, and strength impairments.   Education Assessment   Preferred Learning Style Listening;Reading;Demonstration;Pictures/video   ORTHO GOALS   PT Ortho Eval Goals 1;2;3;4   Ortho Goal 1   Goal Identifier LTG #1   Goal Description Pt. to be independent with correct performance of HEP to progress to independent home management and provide him tools should the condition return.   Target Date 11/25/22   Ortho Goal 2   Goal Identifier LTG #2   Goal Description Pt. to report 4/10 pain at worst with his daily functional activities to return him to his PLOF.   Target Date 11/25/22   Ortho Goal 3   Goal Identifier LTG #3   Goal Description Pt. to improve LE strength to within functional limits in all planes to improve tolerance and performance with daily activities.   Target Date 11/25/22   Ortho Goal 4   Goal Identifier STG #1   Goal Description Pt. to report improvement in his level of pain at worst to 5/10 or better to improve ease and tolerance for daily functional activities.   Target Date  11/04/22   Total Evaluation Time   PT Eval, Low Complexity Minutes (94294) 40   Therapy Certification   Medical Diagnosis Bilateral low back pain with right-sided sciatica, unspecified chronicity

## 2022-10-21 ENCOUNTER — TELEPHONE (OUTPATIENT)
Dept: FAMILY MEDICINE | Facility: OTHER | Age: 87
End: 2022-10-21

## 2022-10-21 ENCOUNTER — LAB (OUTPATIENT)
Dept: LAB | Facility: OTHER | Age: 87
End: 2022-10-21
Payer: MEDICARE

## 2022-10-21 DIAGNOSIS — R79.89 LOW TSH LEVEL: ICD-10-CM

## 2022-10-21 DIAGNOSIS — E11.9 TYPE 2 DIABETES MELLITUS WITHOUT COMPLICATION, WITHOUT LONG-TERM CURRENT USE OF INSULIN (H): ICD-10-CM

## 2022-10-21 DIAGNOSIS — Z71.89 ACP (ADVANCE CARE PLANNING): Primary | Chronic | ICD-10-CM

## 2022-10-21 LAB
ANION GAP SERPL CALCULATED.3IONS-SCNC: 13 MMOL/L (ref 7–15)
BUN SERPL-MCNC: 27.3 MG/DL (ref 8–23)
CALCIUM SERPL-MCNC: 9 MG/DL (ref 8.8–10.2)
CHLORIDE SERPL-SCNC: 101 MMOL/L (ref 98–107)
CREAT SERPL-MCNC: 1.6 MG/DL (ref 0.67–1.17)
DEPRECATED HCO3 PLAS-SCNC: 26 MMOL/L (ref 22–29)
EST. AVERAGE GLUCOSE BLD GHB EST-MCNC: 131 MG/DL
GFR SERPL CREATININE-BSD FRML MDRD: 41 ML/MIN/1.73M2
GLUCOSE SERPL-MCNC: 107 MG/DL (ref 70–99)
HBA1C MFR BLD: 6.2 %
POTASSIUM SERPL-SCNC: 4.3 MMOL/L (ref 3.4–5.3)
SODIUM SERPL-SCNC: 140 MMOL/L (ref 136–145)
T4 FREE SERPL-MCNC: 1.13 NG/DL (ref 0.9–1.7)
TSH SERPL DL<=0.005 MIU/L-ACNC: 5.4 UIU/ML (ref 0.3–4.2)

## 2022-10-21 PROCEDURE — 83036 HEMOGLOBIN GLYCOSYLATED A1C: CPT | Mod: ZL

## 2022-10-21 PROCEDURE — 84443 ASSAY THYROID STIM HORMONE: CPT | Mod: ZL

## 2022-10-21 PROCEDURE — 36415 COLL VENOUS BLD VENIPUNCTURE: CPT | Mod: ZL

## 2022-10-21 PROCEDURE — 84439 ASSAY OF FREE THYROXINE: CPT | Mod: ZL

## 2022-10-21 PROCEDURE — 80048 BASIC METABOLIC PNL TOTAL CA: CPT | Mod: ZL

## 2022-10-21 NOTE — TELEPHONE ENCOUNTER
----- Message from JULIENNE Lawson CNP sent at 10/21/2022  3:08 PM CDT -----  Normal T4. Continue to monitor thyroid.

## 2022-10-21 NOTE — TELEPHONE ENCOUNTER
----- Message from JULIENNE Lawson CNP sent at 10/21/2022 12:08 PM CDT -----  Overall stable. Thyroid a little low. Has appointment 10/25/2022 with Dr. Bernal.

## 2022-10-25 ENCOUNTER — OFFICE VISIT (OUTPATIENT)
Dept: FAMILY MEDICINE | Facility: OTHER | Age: 87
End: 2022-10-25
Attending: FAMILY MEDICINE
Payer: COMMERCIAL

## 2022-10-25 VITALS
BODY MASS INDEX: 30.18 KG/M2 | TEMPERATURE: 97.5 F | HEART RATE: 100 BPM | WEIGHT: 187 LBS | SYSTOLIC BLOOD PRESSURE: 136 MMHG | OXYGEN SATURATION: 93 % | DIASTOLIC BLOOD PRESSURE: 74 MMHG

## 2022-10-25 DIAGNOSIS — M54.41 BILATERAL LOW BACK PAIN WITH RIGHT-SIDED SCIATICA, UNSPECIFIED CHRONICITY: ICD-10-CM

## 2022-10-25 DIAGNOSIS — N28.9 RENAL INSUFFICIENCY: ICD-10-CM

## 2022-10-25 DIAGNOSIS — E11.9 TYPE 2 DIABETES MELLITUS WITHOUT COMPLICATION, WITHOUT LONG-TERM CURRENT USE OF INSULIN (H): Primary | ICD-10-CM

## 2022-10-25 PROCEDURE — 99214 OFFICE O/P EST MOD 30 MIN: CPT | Performed by: FAMILY MEDICINE

## 2022-10-25 PROCEDURE — G0463 HOSPITAL OUTPT CLINIC VISIT: HCPCS

## 2022-10-25 RX ORDER — TRAMADOL HYDROCHLORIDE 50 MG/1
TABLET ORAL
Qty: 90 TABLET | Refills: 0 | Status: SHIPPED | OUTPATIENT
Start: 2022-10-25 | End: 2022-12-11

## 2022-10-25 ASSESSMENT — PAIN SCALES - GENERAL: PAINLEVEL: MILD PAIN (2)

## 2022-10-25 NOTE — NURSING NOTE
"Chief Complaint   Patient presents with     Diabetes       Initial /74   Pulse 100   Temp 97.5  F (36.4  C)   Wt 84.8 kg (187 lb)   SpO2 93%   BMI 30.18 kg/m   Estimated body mass index is 30.18 kg/m  as calculated from the following:    Height as of 7/7/22: 1.676 m (5' 6\").    Weight as of this encounter: 84.8 kg (187 lb).  Medication Reconciliation: complete  Gladis Hernandez LPN  "

## 2022-10-27 ENCOUNTER — HOSPITAL ENCOUNTER (OUTPATIENT)
Dept: PHYSICAL THERAPY | Facility: HOSPITAL | Age: 87
Setting detail: THERAPIES SERIES
Discharge: HOME | End: 2022-10-27
Attending: FAMILY MEDICINE
Payer: OTHER MISCELLANEOUS

## 2022-10-27 PROCEDURE — 97140 MANUAL THERAPY 1/> REGIONS: CPT | Mod: GP

## 2022-10-27 PROCEDURE — 97110 THERAPEUTIC EXERCISES: CPT | Mod: GP

## 2022-11-03 ENCOUNTER — HOSPITAL ENCOUNTER (OUTPATIENT)
Dept: PHYSICAL THERAPY | Facility: HOSPITAL | Age: 87
Setting detail: THERAPIES SERIES
Discharge: HOME | End: 2022-11-03
Attending: FAMILY MEDICINE
Payer: OTHER MISCELLANEOUS

## 2022-11-03 PROCEDURE — 97110 THERAPEUTIC EXERCISES: CPT | Mod: GP

## 2022-11-03 PROCEDURE — 97012 MECHANICAL TRACTION THERAPY: CPT | Mod: GP

## 2022-11-07 ENCOUNTER — HOSPITAL ENCOUNTER (OUTPATIENT)
Dept: PHYSICAL THERAPY | Facility: HOSPITAL | Age: 87
Setting detail: THERAPIES SERIES
Discharge: HOME | End: 2022-11-07
Attending: FAMILY MEDICINE
Payer: OTHER MISCELLANEOUS

## 2022-11-07 PROCEDURE — 97110 THERAPEUTIC EXERCISES: CPT | Mod: GP

## 2022-11-07 PROCEDURE — 97012 MECHANICAL TRACTION THERAPY: CPT | Mod: GP

## 2022-11-10 ENCOUNTER — HOSPITAL ENCOUNTER (OUTPATIENT)
Dept: PHYSICAL THERAPY | Facility: HOSPITAL | Age: 87
Setting detail: THERAPIES SERIES
Discharge: HOME | End: 2022-11-10
Attending: FAMILY MEDICINE
Payer: OTHER MISCELLANEOUS

## 2022-11-10 PROCEDURE — 97012 MECHANICAL TRACTION THERAPY: CPT | Mod: GP,CQ

## 2022-11-10 PROCEDURE — 97110 THERAPEUTIC EXERCISES: CPT | Mod: GP,CQ

## 2022-11-15 DIAGNOSIS — K21.9 GASTROESOPHAGEAL REFLUX DISEASE WITHOUT ESOPHAGITIS: ICD-10-CM

## 2022-11-15 RX ORDER — OMEPRAZOLE 40 MG/1
40 CAPSULE, DELAYED RELEASE ORAL 2 TIMES DAILY
Qty: 90 CAPSULE | Refills: 3 | Status: SHIPPED | OUTPATIENT
Start: 2022-11-15

## 2022-11-17 ENCOUNTER — HOSPITAL ENCOUNTER (OUTPATIENT)
Dept: PHYSICAL THERAPY | Facility: HOSPITAL | Age: 87
Setting detail: THERAPIES SERIES
Discharge: HOME | End: 2022-11-17
Attending: FAMILY MEDICINE
Payer: OTHER MISCELLANEOUS

## 2022-11-17 PROCEDURE — 97012 MECHANICAL TRACTION THERAPY: CPT | Mod: GP,CQ

## 2022-11-21 ENCOUNTER — HOSPITAL ENCOUNTER (OUTPATIENT)
Dept: PHYSICAL THERAPY | Facility: HOSPITAL | Age: 87
Setting detail: THERAPIES SERIES
Discharge: HOME | End: 2022-11-21
Attending: FAMILY MEDICINE
Payer: OTHER MISCELLANEOUS

## 2022-11-21 PROCEDURE — 97012 MECHANICAL TRACTION THERAPY: CPT | Mod: GP

## 2022-11-28 ENCOUNTER — HOSPITAL ENCOUNTER (OUTPATIENT)
Dept: PHYSICAL THERAPY | Facility: HOSPITAL | Age: 87
Setting detail: THERAPIES SERIES
Discharge: HOME | End: 2022-11-28
Attending: FAMILY MEDICINE
Payer: OTHER MISCELLANEOUS

## 2022-11-28 PROCEDURE — 97012 MECHANICAL TRACTION THERAPY: CPT | Mod: GP

## 2022-11-28 NOTE — PROGRESS NOTES
[facility]    Outpatient Physical Therapy Progress Note  Patient: Kenyon Rivera  : 1934    Beginning/End Dates of Reporting Period:  10/14/22 to 22    Referring Provider: Dr. Bernal     Therapy Diagnosis: Pt. presents to therapy with low back and lower extremity pain following a work injury. Clinical findings are consistent with Low back pain with radiating pain likely due to his past imjury and surgical history.     Client Self Report: (P) Aaron is doing well today. He was somewhat stiff and sore this morning but he was able to use his home stretches to modulate. He believes the traction continues to provide him great benefit and would like to do a few more visits.    Goals:  Goal Identifier (P) LTG #1   Goal Description (P) Pt. to be independent with correct performance of HEP to progress to independent home management and provide him tools should the condition return.   Target Date (P) 22   Date Met      Progress (detail required for progress note): (P) Partially met: Pt. is compliant with his current HEP but is not yet ready for independent home management.     Goal Identifier (P) LTG #2   Goal Description (P) Pt. to report 4/10 pain at worst with his daily functional activities to return him to his PLOF.   Target Date (P) 22   Date Met  (P) 22   Progress (detail required for progress note): (P) MET: Pt. is 2/10 at worst today.     Goal Identifier (P) LTG #3   Goal Description (P) Pt. to improve LE strength to within functional limits in all planes to improve tolerance and performance with daily activities.   Target Date (P) 22   Date Met      Progress (detail required for progress note): (P) Not met: LE strength remains limited at this time.     Goal Identifier (P) STG #1   Goal Description (P) Pt. to report improvement in his level of pain at worst to 5/10 or  better to improve ease and tolerance for daily functional activities.   Target Date (P) 11/04/22   Date Met  (P) 11/21/22   Progress (detail required for progress note): (P) Pt. reports pain 2/10 at worst     Plan:  Continue therapy per current plan of care.    Discharge:  No

## 2022-12-05 ENCOUNTER — HOSPITAL ENCOUNTER (OUTPATIENT)
Dept: PHYSICAL THERAPY | Facility: HOSPITAL | Age: 87
Setting detail: THERAPIES SERIES
Discharge: HOME | End: 2022-12-05
Attending: FAMILY MEDICINE
Payer: OTHER MISCELLANEOUS

## 2022-12-05 PROCEDURE — 97012 MECHANICAL TRACTION THERAPY: CPT | Mod: GP,CQ

## 2022-12-08 ENCOUNTER — HOSPITAL ENCOUNTER (OUTPATIENT)
Dept: PHYSICAL THERAPY | Facility: HOSPITAL | Age: 87
Setting detail: THERAPIES SERIES
Discharge: HOME | End: 2022-12-08
Attending: FAMILY MEDICINE
Payer: OTHER MISCELLANEOUS

## 2022-12-08 PROCEDURE — 97012 MECHANICAL TRACTION THERAPY: CPT | Mod: GP,CQ

## 2022-12-09 DIAGNOSIS — M54.41 BILATERAL LOW BACK PAIN WITH RIGHT-SIDED SCIATICA, UNSPECIFIED CHRONICITY: ICD-10-CM

## 2022-12-09 NOTE — TELEPHONE ENCOUNTER
traMADol      Last Written Prescription Date:  10/25/22  Last Fill Quantity: 90,   # refills: 0  Last Office Visit: 10/25/22  Future Office visit:       Routing refill request to provider for review/approval because:

## 2022-12-11 RX ORDER — TRAMADOL HYDROCHLORIDE 50 MG/1
TABLET ORAL
Qty: 90 TABLET | OUTPATIENT
Start: 2022-12-11

## 2023-01-01 ENCOUNTER — TRANSFERRED RECORDS (OUTPATIENT)
Dept: HEALTH INFORMATION MANAGEMENT | Facility: CLINIC | Age: 88
End: 2023-01-01

## 2023-01-13 NOTE — PROGRESS NOTES
[facility] Rehabilitation Service    Outpatient Physical Therapy Discharge Note  Patient: Kenyon Rivera  : 1934    Beginning/End Dates of Reporting Period:  22 to 22    Referring Provider: Dr. Bernal    Therapy Diagnosis: Pt. presents to therapy with low back and lower extremity pain following a work injury. Clinical findings are consistent with Low back pain with radiating pain likely due to his past injury and surgical history.     Client Self Report: Pt states low back is feeling really good    Goals:  Goal Identifier (P) LTG #1   Goal Description (P) Pt. to be independent with correct performance of HEP to progress to independent home management and provide him tools should the condition return.   Target Date (P) 22   Date Met   22   Progress (detail required for progress note): MET     Goal Identifier (P) LTG #2   Goal Description (P) Pt. to report 4/10 pain at worst with his daily functional activities to return him to his PLOF.   Target Date (P) 22   Date Met  (P) 22   Progress (detail required for progress note): (P) MET: Pt. is 2/10 at worst today.     Goal Identifier (P) LTG #3   Goal Description (P) Pt. to improve LE strength to within functional limits in all planes to improve tolerance and performance with daily activities.   Target Date (P) 22   Date Met      Progress (detail required for progress note): (P) Not met: LE strength remains limited at this time.     Goal Identifier (P) STG #1   Goal Description (P) Pt. to report improvement in his level of pain at worst to 5/10 or better to improve ease and tolerance for daily functional activities.   Target Date (P) 22   Date Met  (P) 22   Progress (detail required for progress note): (P) Pt. reports pain 2/10 at worst           Plan:  Discharge from therapy.    Discharge:    Reason for Discharge: Patient has  met all goals.  Patient chooses to discontinue therapy.    Equipment Issued: N/A    Discharge Plan: Patient to continue home program.

## 2023-01-23 NOTE — PROGRESS NOTES
"  Assessment & Plan     1. Skin lesion  - Adult Dermatology Referral; Future    2. History of melanoma  - Adult Dermatology Referral; Future- [facility]      BMI:   Estimated body mass index is 30.02 kg/m  as calculated from the following:    Height as of 7/7/22: 1.676 m (5' 6\").    Weight as of this encounter: 84.4 kg (186 lb).     No follow-ups on file.    Karma Tinsley, CNP  [facility]    Toyin Walker is a 88 year old, presenting for the following health issues:  Derm Problem      HPI   Concern - black spot in belly  Onset: noticed on Sunday, two days ago. .  Description: black spot in abdominal skin fold  Intensity: mild  Progression of Symptoms:  same  Accompanying Signs & Symptoms: none  Previous history of similar problem: history of skin cancer  Precipitating factors:        Worsened by: nothing  Alleviating factors:        Improved by: nothing  Therapies tried and outcome:  none     Skin tag: right neck. After wrapping up visit and getting ready for check out, patient requests skin tag be removed today due to rubbing on his shirt. Consent signed.      Review of Systems   Constitutional, HEENT, cardiovascular, pulmonary, gi and gu systems are negative, except as otherwise noted.      Objective    BP (!) 142/80 (BP Location: Right arm, Patient Position: Sitting, Cuff Size: Adult Regular)   Pulse 108   Temp 97.8  F (36.6  C) (Tympanic)   Resp 20   Wt 84.4 kg (186 lb)   SpO2 97%   BMI 30.02 kg/m    Body mass index is 30.02 kg/m .     Physical Exam   GENERAL: healthy, alert and no distress  SKIN: skin tag right neck, atypical nevus right side of lower abdomen that is 1x1 cm with irregular borders and multicolored.      Risks and benefits of removal vs no removal discussed with Aaron. He agrees to removal.   Written consent signed by patient, Cristina Song LPN, and myself. Time out for skin tag removal 1415 and all present in agreement. Skin cleansed with alcohol wipe and " skin numbed with 0.2 ml of 1% lidocaine with epi. Skin cleansed with x 3 with 10% providone-iodine. Tag of right base of neck removed with sterile scissors. Scant bleeding noted which quickly resolved with 1 mintue of pressure via sterile gauze.  Bandage applied. Patient tolerated very well.

## 2023-01-24 ENCOUNTER — OFFICE VISIT (OUTPATIENT)
Dept: FAMILY MEDICINE | Facility: OTHER | Age: 88
End: 2023-01-24
Attending: NURSE PRACTITIONER
Payer: MEDICARE

## 2023-01-24 VITALS
DIASTOLIC BLOOD PRESSURE: 80 MMHG | TEMPERATURE: 97.8 F | BODY MASS INDEX: 30.02 KG/M2 | SYSTOLIC BLOOD PRESSURE: 142 MMHG | HEART RATE: 108 BPM | OXYGEN SATURATION: 97 % | RESPIRATION RATE: 20 BRPM | WEIGHT: 186 LBS

## 2023-01-24 DIAGNOSIS — Z85.820 HISTORY OF MELANOMA: ICD-10-CM

## 2023-01-24 DIAGNOSIS — L91.8 SKIN TAG: ICD-10-CM

## 2023-01-24 DIAGNOSIS — L98.9 SKIN LESION: Primary | ICD-10-CM

## 2023-01-24 PROCEDURE — 11200 RMVL SKIN TAGS UP TO&INC 15: CPT | Performed by: NURSE PRACTITIONER

## 2023-01-24 PROCEDURE — 88305 TISSUE EXAM BY PATHOLOGIST: CPT | Mod: TC | Performed by: NURSE PRACTITIONER

## 2023-01-24 PROCEDURE — 88305 TISSUE EXAM BY PATHOLOGIST: CPT | Mod: 26 | Performed by: PATHOLOGY

## 2023-01-24 PROCEDURE — G0463 HOSPITAL OUTPT CLINIC VISIT: HCPCS | Mod: 25 | Performed by: NURSE PRACTITIONER

## 2023-01-24 PROCEDURE — 99213 OFFICE O/P EST LOW 20 MIN: CPT | Mod: 25 | Performed by: NURSE PRACTITIONER

## 2023-01-24 ASSESSMENT — ANXIETY QUESTIONNAIRES
2. NOT BEING ABLE TO STOP OR CONTROL WORRYING: SEVERAL DAYS
3. WORRYING TOO MUCH ABOUT DIFFERENT THINGS: SEVERAL DAYS
7. FEELING AFRAID AS IF SOMETHING AWFUL MIGHT HAPPEN: NOT AT ALL
4. TROUBLE RELAXING: NOT AT ALL
1. FEELING NERVOUS, ANXIOUS, OR ON EDGE: SEVERAL DAYS
GAD7 TOTAL SCORE: 3
IF YOU CHECKED OFF ANY PROBLEMS ON THIS QUESTIONNAIRE, HOW DIFFICULT HAVE THESE PROBLEMS MADE IT FOR YOU TO DO YOUR WORK, TAKE CARE OF THINGS AT HOME, OR GET ALONG WITH OTHER PEOPLE: NOT DIFFICULT AT ALL
GAD7 TOTAL SCORE: 3
5. BEING SO RESTLESS THAT IT IS HARD TO SIT STILL: NOT AT ALL
6. BECOMING EASILY ANNOYED OR IRRITABLE: NOT AT ALL

## 2023-01-24 ASSESSMENT — PAIN SCALES - GENERAL: PAINLEVEL: MILD PAIN (2)

## 2023-01-24 NOTE — PATIENT INSTRUCTIONS
Skin Tag removal:   Keep area clean and dry. Small drop(s) of blood are expected today.  Monitor for the following    Ongoing bleedin  Signs of an infection, such as:  Increasing redness or swelling around the area  Increased warmth from the area  Worsening pain  Red streaking lines away from the area  Fluid draining from the area  Fever of 100.4 F (38 C) or higher, or as directed by your healthcare provider

## 2023-01-30 LAB
PATH REPORT.COMMENTS IMP SPEC: NORMAL
PATH REPORT.FINAL DX SPEC: NORMAL
PATH REPORT.GROSS SPEC: NORMAL
PATH REPORT.MICROSCOPIC SPEC OTHER STN: NORMAL
PATH REPORT.RELEVANT HX SPEC: NORMAL
PHOTO IMAGE: NORMAL

## 2023-01-31 ENCOUNTER — TELEPHONE (OUTPATIENT)
Dept: FAMILY MEDICINE | Facility: OTHER | Age: 88
End: 2023-01-31

## 2023-01-31 NOTE — TELEPHONE ENCOUNTER
11:30 AM    Reason for Call: OVERBOOK    Patient is having the following symptoms: was in er yesterday.   er f/u/ pt needs 2 apts one for work comp, back injury/regular apt for not sleeping for about 3 weeks. Needs later apt to arrange transportation    The patient is requesting an appointment for within a week with Dr Bernal.    Was an appointment offered for this call? No  If yes : Appointment type              Date    Preferred method for responding to this message: Telephone Call  What is your phone number ?[phone]    If we cannot reach you directly, may we leave a detailed response at the number you provided? Yes    Can this message wait until your PCP/provider returns, if unavailable today? Not applicable    Lucy Israel

## 2023-02-08 NOTE — PROGRESS NOTES
Assessment & Plan     Type 2 diabetes mellitus without complication, without long-term current use of insulin (H)  Doing well.  Update full labs and follow.    - Hemoglobin A1c; Future  - Comprehensive metabolic panel; Future  - Lipid Profile; Future  - Albumin Random Urine Quantitative with Creat Ratio; Future  - FL FOOT EXAM NO CHARGE  - Hemoglobin A1c  - Comprehensive metabolic panel  - Lipid Profile  - Albumin Random Urine Quantitative with Creat Ratio    Hypertension, Benign  Stable.      Hypertrophy of prostate without urinary obstruction  Stable sx.  No change.      Insomnia, unspecified type  Nice review with Aaron.  On temazepam for many years.  Cannot stay asleep.  I sent 15 mg capsules so he can split his dose overnight.  He will try this.  The trazodone helps some and he would like to continue that as well.  Nice review.  Monitor for side effects.    - temazepam (RESTORIL) 15 MG capsule; Take 2 capsules (30 mg) by mouth nightly as needed for sleep  - traZODone (DESYREL) 50 MG tablet; Take 1 tablet (50 mg) by mouth At Bedtime    Other chronic pain  As above addressed elsewhere.    - Drug Confirmation Panel Urine with Creatinine                 No follow-ups on file.    Eric Bernal MD  [facility]   Aaron is a 88 year old, presenting for the following health issues:  Diabetes and Insomnia      HPI     Diabetes Follow-up      How often are you checking your blood sugar? Not at all    What concerns do you have today about your diabetes? None     Do you have any of these symptoms? (Select all that apply)  Numbness in feet              Hyperlipidemia Follow-Up      Are you regularly taking any medication or supplement to lower your cholesterol?   No    Are you having muscle aches or other side effects that you think could be caused by your cholesterol lowering medication?  No    Hypertension Follow-up      Do you check your blood pressure regularly outside of the  clinic? Yes     Are you following a low salt diet? Yes    Are your blood pressures ever more than 140 on the top number (systolic) OR more   than 90 on the bottom number (diastolic), for example 140/90? No    BP Readings from Last 2 Encounters:   02/14/23 136/70   02/14/23 136/70     Hemoglobin A1C (%)   Date Value   10/21/2022 6.2 (H)   03/08/2022 6.4 (H)   02/16/2021 6.1 (H)   11/12/2020 6.4 (H)     LDL Cholesterol Calculated (mg/dL)   Date Value   11/15/2021 113 (H)   11/12/2020 87   11/01/2019 112 (H)     Insomnia       Duration: 1-2 months     Description (location/character/radiation): trouble sleeping     Intensity:  moderate    Accompanying signs and symptoms: trouble staying asleep, waking at about 130 am     History (similar episodes/previous evaluation): None    Precipitating or alleviating factors: None    Therapies tried and outcome: temazepam and trazodone         Diabetic foot exam   1. foot deformity   No  2. Current or previous foot ulceration     No  3. Current or previous pre-ulcerative calluses     Yes  4. previous partial amputation of one or both feet or complete amputation of one foot     No  5. 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 poor circulation     No  Approval given for 3 pairs of diabetic shoes and inserts if patient desires.        Review of Systems   Constitutional, HEENT, cardiovascular, pulmonary, gi and gu systems are negative, except as otherwise noted.      Objective    /70   Pulse 97   Temp 97.2  F (36.2  C) (Tympanic)   Wt 84.8 kg (187 lb)   SpO2 94%   BMI 30.18 kg/m    Body mass index is 30.18 kg/m .  Physical Exam   GENERAL: healthy, alert and no distress  NECK: no adenopathy, no asymmetry, masses, or scars and thyroid normal to palpation  RESP: lungs clear to auscultation - no rales, rhonchi or wheezes  CV: regular rate and rhythm, normal S1 S2, no S3 or S4, no murmur, click or rub, no peripheral edema and peripheral pulses  strong  ABDOMEN: soft, nontender, no hepatosplenomegaly, no masses and bowel sounds normal  MS: no gross musculoskeletal defects noted, no edema    Full labs pending.

## 2023-02-08 NOTE — PROGRESS NOTES
Occupational Visit     SUBJECTIVE:  Kenyon Rivera, 88 year old, male is seen for follow up of occupational injury. Date of injury is 5/31/1975.    Linked Episodes   Type: Episode: Status: Noted: Resolved: Last update: Updated by:   WORK COMP [employer] Active 5/31/1975 2/8/2023  1:57 PM Orly Domingo LPN      Comments:       Musculoskeletal problem/pain       Duration: 5/31/1975    Description  Location: low back     Intensity:  moderate    Accompanying signs and symptoms: radiation of pain to bilateral leg, numbness in bilateral legs     History  Previous similar problem: YES  Previous evaluation:  x-ray and MRI    Precipitating or alleviating factors:  Trauma or overuse: YES- work injury  Aggravating factors include: none    Therapies tried and outcome: acetaminophen and Ibuprofen and tramadol       Allergies   Allergen Reactions     Chlorzoxazone      Parafon Forte         Review of Systems:  Constitutional, HEENT, cardiovascular, pulmonary, gi and gu systems are negative, except as otherwise noted.      OBJECTIVE:  /70   Pulse 97   Temp 97.2  F (36.2  C) (Tympanic)   Wt 84.8 kg (187 lb)   SpO2 94%   BMI 30.18 kg/m              Exam:  Normal gait and affect.  Rises slowly from chair favoring back.        Labs: No results found for this or any previous visit (from the past 24 hour(s)).      ASSESSMENT/PLAN:  (G89.29) Other chronic pain  (primary encounter diagnosis)  Comment: stable.    Plan: Drug Confirmation Panel Urine with Creatinine        Update chronic pain.  UDS pending.  PDMP reviewed and stable.  He takes temazepam and Aaron is well aware of the interactions with the tramadol, which he takes minimally.  F/u with ongoing concerns.      (M54.41) Bilateral low back pain with right-sided sciatica, unspecified chronicity  Comment: as above.    Plan: traMADol (ULTRAM) 50 MG tablet        As above.  Refill tramadol sent.  Follow

## 2023-02-14 ENCOUNTER — OFFICE VISIT (OUTPATIENT)
Dept: FAMILY MEDICINE | Facility: OTHER | Age: 88
End: 2023-02-14
Attending: FAMILY MEDICINE
Payer: MEDICARE

## 2023-02-14 ENCOUNTER — OFFICE VISIT (OUTPATIENT)
Dept: FAMILY MEDICINE | Facility: OTHER | Age: 88
End: 2023-02-14
Attending: FAMILY MEDICINE
Payer: OTHER MISCELLANEOUS

## 2023-02-14 VITALS
OXYGEN SATURATION: 94 % | WEIGHT: 187 LBS | TEMPERATURE: 97.2 F | SYSTOLIC BLOOD PRESSURE: 136 MMHG | HEART RATE: 97 BPM | BODY MASS INDEX: 30.18 KG/M2 | HEART RATE: 97 BPM | WEIGHT: 187 LBS | OXYGEN SATURATION: 94 % | SYSTOLIC BLOOD PRESSURE: 136 MMHG | DIASTOLIC BLOOD PRESSURE: 70 MMHG | DIASTOLIC BLOOD PRESSURE: 70 MMHG | BODY MASS INDEX: 30.18 KG/M2 | TEMPERATURE: 97.2 F

## 2023-02-14 DIAGNOSIS — G47.00 INSOMNIA, UNSPECIFIED TYPE: ICD-10-CM

## 2023-02-14 DIAGNOSIS — N40.0 HYPERTROPHY OF PROSTATE WITHOUT URINARY OBSTRUCTION: ICD-10-CM

## 2023-02-14 DIAGNOSIS — I10 ESSENTIAL HYPERTENSION, BENIGN: ICD-10-CM

## 2023-02-14 DIAGNOSIS — E11.9 TYPE 2 DIABETES MELLITUS WITHOUT COMPLICATION, WITHOUT LONG-TERM CURRENT USE OF INSULIN (H): Primary | ICD-10-CM

## 2023-02-14 DIAGNOSIS — G89.29 OTHER CHRONIC PAIN: ICD-10-CM

## 2023-02-14 DIAGNOSIS — G89.29 OTHER CHRONIC PAIN: Primary | ICD-10-CM

## 2023-02-14 DIAGNOSIS — M54.41 BILATERAL LOW BACK PAIN WITH RIGHT-SIDED SCIATICA, UNSPECIFIED CHRONICITY: ICD-10-CM

## 2023-02-14 PROCEDURE — 80358 DRUG SCREENING METHADONE: CPT | Performed by: FAMILY MEDICINE

## 2023-02-14 PROCEDURE — 80348 DRUG SCREENING BUPRENORPHINE: CPT | Performed by: FAMILY MEDICINE

## 2023-02-14 PROCEDURE — 80338 ANTIDEPRESSANT NOT SPECIFIED: CPT | Performed by: FAMILY MEDICINE

## 2023-02-14 PROCEDURE — 84999 UNLISTED CHEMISTRY PROCEDURE: CPT | Mod: ZL | Performed by: FAMILY MEDICINE

## 2023-02-14 PROCEDURE — 82570 ASSAY OF URINE CREATININE: CPT | Mod: ZL | Performed by: FAMILY MEDICINE

## 2023-02-14 PROCEDURE — 80357 KETAMINE AND NORKETAMINE: CPT | Performed by: FAMILY MEDICINE

## 2023-02-14 PROCEDURE — 80337 TRICYCLIC & CYCLICALS 6/MORE: CPT | Performed by: FAMILY MEDICINE

## 2023-02-14 PROCEDURE — 80061 LIPID PANEL: CPT | Mod: ZL | Performed by: FAMILY MEDICINE

## 2023-02-14 PROCEDURE — 80371 STIMULANTS SYNTHETIC: CPT | Performed by: FAMILY MEDICINE

## 2023-02-14 PROCEDURE — 80365 DRUG SCREENING OXYCODONE: CPT | Performed by: FAMILY MEDICINE

## 2023-02-14 PROCEDURE — 80359 METHYLENEDIOXYAMPHETAMINES: CPT | Performed by: FAMILY MEDICINE

## 2023-02-14 PROCEDURE — 80360 METHYLPHENIDATE: CPT | Performed by: FAMILY MEDICINE

## 2023-02-14 PROCEDURE — 80361 OPIATES 1 OR MORE: CPT | Performed by: FAMILY MEDICINE

## 2023-02-14 PROCEDURE — 80355 GABAPENTIN NON-BLOOD: CPT | Performed by: FAMILY MEDICINE

## 2023-02-14 PROCEDURE — 80372 DRUG SCREENING TAPENTADOL: CPT | Performed by: FAMILY MEDICINE

## 2023-02-14 PROCEDURE — 80370 SKEL MUSC RELAXANT 3 OR MORE: CPT | Performed by: FAMILY MEDICINE

## 2023-02-14 PROCEDURE — 80326 AMPHETAMINES 5 OR MORE: CPT | Performed by: FAMILY MEDICINE

## 2023-02-14 PROCEDURE — 80373 DRUG SCREENING TRAMADOL: CPT | Performed by: FAMILY MEDICINE

## 2023-02-14 PROCEDURE — 2894A PR VOIDCORRECT: CPT | Performed by: FAMILY MEDICINE

## 2023-02-14 PROCEDURE — 83036 HEMOGLOBIN GLYCOSYLATED A1C: CPT | Mod: ZL | Performed by: FAMILY MEDICINE

## 2023-02-14 PROCEDURE — 80364 OPIOID &OPIATE ANALOG 5/MORE: CPT | Performed by: FAMILY MEDICINE

## 2023-02-14 PROCEDURE — 80354 DRUG SCREENING FENTANYL: CPT | Performed by: FAMILY MEDICINE

## 2023-02-14 PROCEDURE — 80353 DRUG SCREENING COCAINE: CPT | Performed by: FAMILY MEDICINE

## 2023-02-14 PROCEDURE — 99214 OFFICE O/P EST MOD 30 MIN: CPT | Performed by: FAMILY MEDICINE

## 2023-02-14 PROCEDURE — 80341 ANTIEPILEPTICS NOS 7/MORE: CPT | Performed by: FAMILY MEDICINE

## 2023-02-14 PROCEDURE — 80368 SEDATIVE HYPNOTICS: CPT | Performed by: FAMILY MEDICINE

## 2023-02-14 PROCEDURE — 80344 ANTIPSYCHOTICS NOS 7/MORE: CPT | Performed by: FAMILY MEDICINE

## 2023-02-14 PROCEDURE — 80053 COMPREHEN METABOLIC PANEL: CPT | Mod: ZL | Performed by: FAMILY MEDICINE

## 2023-02-14 PROCEDURE — 80334 ANTIDEPRESSANTS CLASS 6/MORE: CPT | Performed by: FAMILY MEDICINE

## 2023-02-14 PROCEDURE — 36415 COLL VENOUS BLD VENIPUNCTURE: CPT | Mod: ZL | Performed by: FAMILY MEDICINE

## 2023-02-14 PROCEDURE — 80367 DRUG SCREENING PROPOXYPHENE: CPT | Performed by: FAMILY MEDICINE

## 2023-02-14 PROCEDURE — 80347 BENZODIAZEPINES 13 OR MORE: CPT | Performed by: FAMILY MEDICINE

## 2023-02-14 PROCEDURE — 80331 ANALGESICS NON-OPIOID 6/MORE: CPT | Performed by: FAMILY MEDICINE

## 2023-02-14 PROCEDURE — 80377 DRUG/SUBSTANCE NOS 7/MORE: CPT | Performed by: FAMILY MEDICINE

## 2023-02-14 PROCEDURE — 84999 UNLISTED CHEMISTRY PROCEDURE: CPT | Performed by: FAMILY MEDICINE

## 2023-02-14 PROCEDURE — 83992 ASSAY FOR PHENCYCLIDINE: CPT | Performed by: FAMILY MEDICINE

## 2023-02-14 PROCEDURE — 80307 DRUG TEST PRSMV CHEM ANLYZR: CPT | Mod: ZL | Performed by: FAMILY MEDICINE

## 2023-02-14 PROCEDURE — G0463 HOSPITAL OUTPT CLINIC VISIT: HCPCS

## 2023-02-14 PROCEDURE — 80366 DRUG SCREENING PREGABALIN: CPT | Performed by: FAMILY MEDICINE

## 2023-02-14 RX ORDER — TEMAZEPAM 15 MG/1
30 CAPSULE ORAL
Qty: 60 CAPSULE | Refills: 3 | Status: SHIPPED | OUTPATIENT
Start: 2023-02-14 | End: 2023-02-15

## 2023-02-14 RX ORDER — TRAZODONE HYDROCHLORIDE 50 MG/1
50 TABLET, FILM COATED ORAL AT BEDTIME
COMMUNITY
Start: 2023-01-30 | End: 2023-02-14

## 2023-02-14 RX ORDER — TRAZODONE HYDROCHLORIDE 50 MG/1
50 TABLET, FILM COATED ORAL AT BEDTIME
Qty: 90 TABLET | Refills: 3 | Status: SHIPPED | OUTPATIENT
Start: 2023-02-14 | End: 2023-02-15

## 2023-02-14 RX ORDER — TRAMADOL HYDROCHLORIDE 50 MG/1
TABLET ORAL
Qty: 90 TABLET | Refills: 0 | Status: SHIPPED | OUTPATIENT
Start: 2023-02-14 | End: 2023-03-21

## 2023-02-14 ASSESSMENT — PAIN SCALES - GENERAL
PAINLEVEL: MILD PAIN (2)
PAINLEVEL: MILD PAIN (2)

## 2023-02-14 NOTE — LETTER
Opioid / Opioid Plus Controlled Substance Agreement    This is an agreement between you and your provider about the safe and appropriate use of controlled substance/opioids prescribed by your care team. Controlled substances are medicines that can cause physical and mental dependence (abuse).    There are strict laws about having and using these medicines. We here at [facility] are committing to working with you in your efforts to get better. To support you in this work, we ll help you schedule regular office appointments for medicine refills. If we must cancel or change your appointment for any reason, we ll make sure you have enough medicine to last until your next appointment.     As a Provider, I will:    Listen carefully to your concerns and treat you with respect.     Recommend a treatment plan that I believe is in your best interest. This plan may involve therapies other than opioid pain medication.     Talk with you often about the possible benefits, and the risk of harm of any medicine that we prescribe for you.     Provide a plan on how to taper (discontinue or go off) using this medicine if the decision is made to stop its use.    As a Patient, I understand that opioid(s):     Are a controlled substance prescribed by my care team to help me function or work and manage my condition(s).     Are strong medicines and can cause serious side effects such as:    Drowsiness, which can seriously affect my driving ability    A lower breathing rate, enough to cause death    Harm to my thinking ability     Depression     Abuse of and addiction to this medicine    Need to be taken exactly as prescribed. Combining opioids with certain medicines or chemicals (such as illegal drugs, sedatives, sleeping pills, and benzodiazepines) can be dangerous or even fatal. If I stop opioids suddenly, I may have severe withdrawal symptoms.    Do not work for all types of pain nor for all patients. If they re not helpful, I may  be asked to stop them.        The risks, benefits and side effects of these medicine(s) were explained to me. I agree that:  1. I will take part in other treatments as advised by my care team. This may be psychiatry or counseling, physical therapy, behavioral therapy, group treatment or a referral to a specialist.     2. I will keep all my appointments. I understand that this is part of the monitoring of opioids. My care team may require an office visit for EVERY opioid/controlled substance refill. If I miss appointments or don t follow instructions, my care team may stop my medicine.    3. I will take my medicines as prescribed. I will not change the dose or schedule unless my care team tells me to. There will be no refills if I run out early.     4. I may be asked to come to the clinic and complete a urine drug test or complete a pill count at any time. If I don t give a urine sample or participate in a pill count, the care team may stop my medicine.    5. I will only receive prescriptions from this clinic for chronic pain. If I am treated by another provider for acute pain issues, I will tell them that I am taking opioid pain medication for chronic pain and that I have a treatment agreement with this provider. I will inform my [facility] care team within one business day if I am given a prescription for any pain medication by another healthcare provider. My [facility] care team can contact other providers and pharmacists about my use of any medicines.    6. It is up to me to make sure that I don t run out of my medicines on weekends or holidays. If my care team is willing to refill my opioid prescription without a visit, I must request refills only during office hours. Refills may take up to 3 business days to process. I will use one pharmacy to fill all my opioid and other controlled substance prescriptions. I will notify the clinic about any changes to my insurance or medication  availability.    7. I am responsible for my prescriptions. If the medicine/prescription is lost, stolen or destroyed, it will not be replaced. I also agree not to share controlled substance medicines with anyone.    8. I am aware I should not use any illegal or recreational drugs. I agree not to drink alcohol unless my care team says I can.       9. If I enroll in the [place] [facility] Cannabis program, I will tell my care team prior to my next refill.     10. I will tell my care team right away if I become pregnant, have a new medical problem treated outside of my regular clinic, or have a change in my medications.    11. I understand that this medicine can affect my thinking, judgment and reaction time. Alcohol and drugs affect the brain and body, which can affect the safety of my driving. Being under the influence of alcohol or drugs can affect my decision-making, behaviors, personal safety, and the safety of others. Driving while impaired (DWI) can occur if a person is driving, operating, or in physical control of a car, motorcycle, boat, snowmobile, ATV, motorbike, off-road vehicle, or any other motor vehicle ([place] Statute 169A.20). I understand the risk if I choose to drive or operate any vehicle or machinery.    I understand that if I do not follow any of the conditions above, my prescriptions or treatment may be stopped or changed.          Opioids  What You Need to Know    What are opioids?   Opioids are pain medicines that must be prescribed by a doctor. They are also known as narcotics.     Examples are:   1. morphine (MS Contin, Evelyn)  2. oxycodone (Oxycontin)  3. oxycodone and acetaminophen (Percocet)  4. hydrocodone and acetaminophen (Vicodin, Norco)   5. fentanyl patch (Duragesic)   6. hydromorphone (Dilaudid)   7. methadone  8. codeine (Tylenol #3)     What do opioids do well?   Opioids are best for severe short-term pain such as after a surgery or injury. They may work well for cancer pain. They may  help some people with long-lasting (chronic) pain.     What do opioids NOT do well?   Opioids never get rid of pain entirely, and they don t work well for most patients with chronic pain. Opioids don t reduce swelling, one of the causes of pain.                                    Other ways to manage chronic pain and improve function include:       Treat the health problem that may be causing pain    Anti-inflammation medicines, which reduce swelling and tenderness, such as ibuprofen (Advil, Motrin) or naproxen (Aleve)    Acetaminophen (Tylenol)    Antidepressants and anti-seizure medicines, especially for nerve pain    Topical treatments such as patches or creams    Injections or nerve blocks    Chiropractic or osteopathic treatment    Acupuncture, massage, deep breathing, meditation, visual imagery, aromatherapy    Use heat or ice at the pain site    Physical therapy     Exercise    Stop smoking    Take part in therapy       Risks and side effects     Talk to your doctor before you start or decide to keep taking opioids. Possible side effects include:      Lowering your breathing rate enough to cause death    Overdose, including death, especially if taking higher than prescribed doses    Worse depression symptoms; less pleasure in things you usually enjoy    Feeling tired or sluggish    Slower thoughts or cloudy thinking    Being more sensitive to pain over time; pain is harder to control    Trouble sleeping or restless sleep    Changes in hormone levels (for example, less testosterone)    Changes in sex drive or ability to have sex    Constipation    Unsafe driving    Itching and sweating    Dizziness    Nausea, throwing up and dry mouth    What else should I know about opioids?    Opioids may lead to dependence, tolerance, or addiction.      Dependence means that if you stop or reduce the medicine too quickly, you will have withdrawal symptoms. These include loose poop (diarrhea), jitters, flu-like symptoms,  nervousness and tremors. Dependence is not the same as addiction.                       Tolerance means needing higher doses over time to get the same effect. This may increase the chance of serious side effects.      Addiction is when people improperly use a substance that harms their body, their mind or their relations with others. Use of opiates can cause a relapse of addiction if you have a history of drug or alcohol abuse.      People who have used opioids for a long time may have a lower quality of life, worse depression, higher levels of pain and more visits to doctors.    You can overdose on opioids. Take these steps to lower your risk of overdose:    1. Recognize the signs:  Signs of overdose include decrease or loss of consciousness (blackout), slowed breathing, trouble waking up and blue lips. If someone is worried about overdose, they should call 911.    2. Talk to your doctor about Narcan (naloxone).   If you are at risk for overdose, you may be given a prescription for Narcan. This medicine very quickly reverses the effects of opioids.   If you overdose, a friend or family member can give you Narcan while waiting for the ambulance. They need to know the signs of overdose and how to give Narcan.     3. Don't use alcohol or street drugs.   Taking them with opioids can cause death.    4. Do not take any of these medicines unless your doctor says it s OK. Taking these with opioids can cause death:    Benzodiazepines, such as lorazepam (Ativan), alprazolam (Xanax) or diazepam (Valium)    Muscle relaxers, such as cyclobenzaprine (Flexeril)    Sleeping pills like zolpidem (Ambien)     Other opioids      How to keep you and other people safe while taking opioids:    1. Never share your opioids with others.  Opioid medicines are regulated by the Drug Enforcement Agency ([employer] [place]). Selling or sharing medications is a criminal act.    2. Be sure to store opioids in a secure place, locked up if possible. Young children  can easily swallow them and overdose.    3. When you are traveling with your medicines, keep them in the original bottles. If you use a pill box, be sure you also carry a copy of your medicine list from your clinic or pharmacy.    4. Safe disposal of opioids    Most pharmacies have places to get rid of medicine, called disposal kiosks. Medicine disposal options are also available in every [facility]. Search your county and  medication disposal  to find more options. You can find more details at:  [email or web address]-green/[email or web address]-unwanted-medications     I agree that my provider, clinic care team, and pharmacy may work with any city, state or federal law enforcement agency that investigates the misuse, sale, or other diversion of my controlled medicine. I will allow my provider to discuss my care with, or share a copy of, this agreement with any other treating provider, pharmacy or emergency room where I receive care.    I have read this agreement and have asked questions about anything I did not understand.    _______________________________________________________  Patient Signature - Kenyon Rivera _____________________                   Date     _______________________________________________________  Provider Signature - Eirc Bernal MD   _____________________                   Date     _______________________________________________________  Witness Signature (required if provider not present while patient signing)   _____________________                   Date

## 2023-02-15 ENCOUNTER — TELEPHONE (OUTPATIENT)
Dept: FAMILY MEDICINE | Facility: OTHER | Age: 88
End: 2023-02-15

## 2023-02-15 DIAGNOSIS — G47.00 INSOMNIA, UNSPECIFIED TYPE: ICD-10-CM

## 2023-02-15 LAB
ALBUMIN SERPL BCG-MCNC: 3.9 G/DL (ref 3.5–5.2)
ALP SERPL-CCNC: 103 U/L (ref 40–129)
ALT SERPL W P-5'-P-CCNC: 11 U/L (ref 10–50)
ANION GAP SERPL CALCULATED.3IONS-SCNC: 12 MMOL/L (ref 7–15)
AST SERPL W P-5'-P-CCNC: 17 U/L (ref 10–50)
BILIRUB SERPL-MCNC: <0.2 MG/DL
BUN SERPL-MCNC: 24.4 MG/DL (ref 8–23)
CALCIUM SERPL-MCNC: 9.3 MG/DL (ref 8.8–10.2)
CHLORIDE SERPL-SCNC: 99 MMOL/L (ref 98–107)
CHOLEST SERPL-MCNC: 162 MG/DL
CREAT SERPL-MCNC: 1.57 MG/DL (ref 0.67–1.17)
CREAT UR-MCNC: 28.7 MG/DL
CREAT UR-MCNC: 29 MG/DL
DEPRECATED HCO3 PLAS-SCNC: 26 MMOL/L (ref 22–29)
EST. AVERAGE GLUCOSE BLD GHB EST-MCNC: 140 MG/DL
GFR SERPL CREATININE-BSD FRML MDRD: 42 ML/MIN/1.73M2
GLUCOSE SERPL-MCNC: 129 MG/DL (ref 70–99)
HBA1C MFR BLD: 6.5 %
HDLC SERPL-MCNC: 33 MG/DL
LDLC SERPL CALC-MCNC: 97 MG/DL
MICROALBUMIN UR-MCNC: <12 MG/L
MICROALBUMIN/CREAT UR: NORMAL MG/G{CREAT}
NONHDLC SERPL-MCNC: 129 MG/DL
POTASSIUM SERPL-SCNC: 4.9 MMOL/L (ref 3.4–5.3)
PROT SERPL-MCNC: 7.4 G/DL (ref 6.4–8.3)
SODIUM SERPL-SCNC: 137 MMOL/L (ref 136–145)
TRIGL SERPL-MCNC: 160 MG/DL

## 2023-02-15 RX ORDER — TRAZODONE HYDROCHLORIDE 50 MG/1
50 TABLET, FILM COATED ORAL AT BEDTIME
Qty: 90 TABLET | Refills: 3 | Status: SHIPPED | OUTPATIENT
Start: 2023-02-15

## 2023-02-15 RX ORDER — TEMAZEPAM 15 MG/1
30 CAPSULE ORAL
Qty: 60 CAPSULE | Refills: 3 | Status: SHIPPED | OUTPATIENT
Start: 2023-02-15

## 2023-02-16 LAB
Lab: NORMAL
PERFORMING LABORATORY: NORMAL
SPECIMEN STATUS: NORMAL
TEST NAME: NORMAL

## 2023-03-06 ENCOUNTER — TELEPHONE (OUTPATIENT)
Dept: FAMILY MEDICINE | Facility: OTHER | Age: 88
End: 2023-03-06

## 2023-03-06 LAB — LABCORP INTERFACED MISCELLANEOUS TEST RESULT: NORMAL

## 2023-03-06 NOTE — TELEPHONE ENCOUNTER
FYI-Lab called to notify that drug screen was combined with another pt's urine and that is probably why pt's drug screen was positive for codeine.  Test was cancelled and credited.

## 2023-03-14 DIAGNOSIS — M54.41 BILATERAL LOW BACK PAIN WITH RIGHT-SIDED SCIATICA, UNSPECIFIED CHRONICITY: ICD-10-CM

## 2023-03-15 RX ORDER — CYCLOBENZAPRINE HCL 10 MG
TABLET ORAL
Qty: 180 TABLET | Refills: 3 | Status: SHIPPED | OUTPATIENT
Start: 2023-03-15 | End: 2023-03-21

## 2023-03-15 NOTE — TELEPHONE ENCOUNTER
flexeral      Last Written Prescription Date:  12/11/22  Last Fill Quantity: 180,   # refills: 3  Last Office Visit: 2/14/23  Future Office visit:    Next 5 appointments (look out 90 days)    Apr 25, 2023 11:15 AM  (Arrive by 11:00 AM)  SHORT with Eric Bernal MD  [facility] ([facility] [address]  [facility] [address]  [phone]

## 2023-03-20 DIAGNOSIS — M54.41 BILATERAL LOW BACK PAIN WITH RIGHT-SIDED SCIATICA, UNSPECIFIED CHRONICITY: ICD-10-CM

## 2023-03-21 ENCOUNTER — TELEPHONE (OUTPATIENT)
Dept: FAMILY MEDICINE | Facility: OTHER | Age: 88
End: 2023-03-21

## 2023-03-21 RX ORDER — CYCLOBENZAPRINE HCL 10 MG
TABLET ORAL
Qty: 180 TABLET | Refills: 3 | Status: SHIPPED | OUTPATIENT
Start: 2023-03-21

## 2023-03-21 RX ORDER — TRAMADOL HYDROCHLORIDE 50 MG/1
TABLET ORAL
Qty: 90 TABLET | Refills: 0 | Status: SHIPPED | OUTPATIENT
Start: 2023-03-21

## 2023-03-21 NOTE — TELEPHONE ENCOUNTER
10:15 AM    Reason for Call: Phone Call    Description: Kenyon is wondering if he needs to come in for an ER follow up as he thinks he has everything under control and he's feeling ok. Please call Kenyon to advise.     Was an appointment offered for this call? No  If yes : Appointment type              Date    Preferred method for responding to this message: Telephone Call  What is your phone number ?  [phone]    If we cannot reach you directly, may we leave a detailed response at the number you provided? Yes    Can this message wait until your PCP/provider returns, if available today?  Not applicable    Sarah Smallwood

## 2024-04-17 NOTE — PROGRESS NOTES
02/16/18 1000   Quick Adds   Quick Adds Certification   Therapy Certification   Certification date from 02/16/18   Certification date to 03/30/18   Medical Diagnosis s/p right ring finger pollack fasciotomy   Certification I certify the need for these services furnished under this plan of treatment and while under my care.  (Physician co-signature of this document indicates review and certification of the therapy plan).   General Information/History   Start Of Care Date 02/16/18   Referring Physician Devon Ball   Orders Evaluate And Treat As Indicated   Orders Date 02/14/18   Medical Diagnosis s/p right ring finger pollack fasciotomy   Additional Occupational Profile Info/Pertinent history of current problem Pt is an 84 yo left handed male who presents to OT followin right ring finger pollack fasciotomy.  Pt is very pleased with the results of his surgery and has been doing exercises at home already.   Previous treatment or current condition surgery x 2 on right finger, blocking execises   How/Where did it occur From insidious onset   Onset date of current episode/exacerbation 02/16/15   Date of surgery 02/05/18   Surgical procedure fasciotomy   Chronicity Chronic   Hand Dominance Left   Affected side Right   Functional limitations perform activities of daily living   Reported Symptoms Pain;Loss of Motion/Stiffness;Edema   QuickDASH [Functional Disability Questionnaire; 0-100 (0=no dysfunction; 100=dysfunction)] Open Dash   Open Jar 1   Heavy Household Chores 1   Carry a shopping bag 1   Wash back 1   Cut food with knife 1   Recreational activities 1   Social activities 1   Work, daily activities 1   Pain 2   Tingling 1   Sleeping 1   QuickDASH Sum 12   QuickDASH Count 11   QuickDASH Disability/Symptom Score 2.27   Prior level of function Independent ADL;Independent IADL   Important Activities working out at the [employer], gardening, runs vegetable stand, [place]   Living environment Apartment/condo   Patient  [facility]    Phone Message    May a detailed message be left on voicemail: yes     Reason for Call: Other: Pt is asking if he should take his medications prior to the stress test.  Please call back to inform     Action Taken: Other: cardio    Travel Screening: Not Applicable  Thank you!  [facility]                                                                     role/Employment history Retired   Patient/Family goals statement Just get it a little better than it is   Fall Risk Screen   Fall screen completed by OT   Have you fallen 2 or more times in the past year? No   Have you fallen and had an injury in the past year? No   Is patient a fall risk? No   Pain   Pain Primary Pain Report   Primary Pain Report   Location right ring finger   Radiation Does Not Radiate   Pain Quality (nuisance)   Frequency Intermittent   Scale 1/10   Progression Since Onset Gradually Improving   Edema   Edema Ring   Ring (measured in cm)   P1  - Left 7.1   DIP  - Left 8.6   ROM   ROM AROM   AROM   AROM Ring   Ring   MCP Extension - Right 0   MCP Flexion - Right 73   PIP Extension - Right -23   PIP Flexion - Right 71   DIP Extension - Right -4   DIP Flexion - Right 50   Education Assessment   Preferred Learning Style Demonstration   Barriers to Learning No barriers   Therapy Interventions   Planned Therapy Interventions Edema Management;Scar Management;ROM;Stretching;Manual Therapy   Clinical Impression   Criteria for Skilled Therapeutic Interventions Met yes   OT Diagnosis s/p right ring finger pollack fasciotomy   Influenced by the following impairments Pain;Edema;Decreased range of motion   Assessment of Occupational Performance 1-3 Performance Deficits   Identified Performance Deficits shaving, reaching into back pocket   Clinical Decision Making (Complexity) Low complexity   Therapy Frequency 1x/week   Predicted Duration of Therapy Intervention (days/wks) 4 weeks   Risks and Benefits of Treatment have been explained. Yes   Patient, Family & other staff in agreement with plan of care Yes   Hand Goals   Hand Goals Enter Other Activity Here;Hygiene/Toileting   Hygiene/Toileting   Current Functional Task Shaving   Previous Performance Level Independent   Current Performance Level Mild difficulty   Goal Target Task Hold razor and shave   Goal Target Performance Level No difficulty   Due Date  03/16/18   Enter Other Activity Here   Current Functional Task reaching into back pocket   Previous Performance Level no difficulty   Current Performance Level moderate difficulty   STG Target Task Pt able to reach into back  pocket and pull out handkerchief with right hand   Due Date 03/16/18   Total Evaluation Time   Total Evaluation Time (Minutes) 23   I certify the need for these services furnished under this plan of treatment and while under my care. (Physician co-signature of this document indicates review and certification of the therapy plan).      _____________________________     __________________________    ____________  Physician's Signature                 Date               Time

## (undated) DEVICE — TUBING-SUCTION 20FT

## (undated) DEVICE — CONNECTOR-ERBEFLO 2 PORT

## (undated) DEVICE — FORCEP-COLON BIOPSY LARGE W/NEEDLE 240CM

## (undated) DEVICE — IRRIGATION-H2O 1000ML

## (undated) RX ORDER — PROPOFOL 10 MG/ML
INJECTION, EMULSION INTRAVENOUS
Status: DISPENSED
Start: 2019-12-05

## (undated) RX ORDER — LIDOCAINE HYDROCHLORIDE 20 MG/ML
INJECTION, SOLUTION EPIDURAL; INFILTRATION; INTRACAUDAL; PERINEURAL
Status: DISPENSED
Start: 2019-12-05